# Patient Record
Sex: FEMALE | Race: WHITE | NOT HISPANIC OR LATINO | Employment: PART TIME | URBAN - METROPOLITAN AREA
[De-identification: names, ages, dates, MRNs, and addresses within clinical notes are randomized per-mention and may not be internally consistent; named-entity substitution may affect disease eponyms.]

---

## 2017-11-01 ENCOUNTER — HOSPITAL ENCOUNTER (OUTPATIENT)
Facility: CLINIC | Age: 62
Discharge: HOME | End: 2017-11-01
Attending: COLON & RECTAL SURGERY | Admitting: COLON & RECTAL SURGERY
Payer: COMMERCIAL

## 2017-11-01 VITALS
SYSTOLIC BLOOD PRESSURE: 144 MMHG | OXYGEN SATURATION: 95 % | DIASTOLIC BLOOD PRESSURE: 72 MMHG | BODY MASS INDEX: 29.03 KG/M2 | WEIGHT: 185 LBS | HEIGHT: 67 IN | RESPIRATION RATE: 17 BRPM

## 2017-11-01 LAB — COLONOSCOPY: NORMAL

## 2017-11-01 PROCEDURE — 25000128 H RX IP 250 OP 636: Performed by: COLON & RECTAL SURGERY

## 2017-11-01 PROCEDURE — G0500 MOD SEDAT ENDO SERVICE >5YRS: HCPCS | Performed by: COLON & RECTAL SURGERY

## 2017-11-01 PROCEDURE — 45378 DIAGNOSTIC COLONOSCOPY: CPT | Performed by: COLON & RECTAL SURGERY

## 2017-11-01 PROCEDURE — G0121 COLON CA SCRN NOT HI RSK IND: HCPCS | Performed by: COLON & RECTAL SURGERY

## 2017-11-01 RX ORDER — ONDANSETRON 2 MG/ML
4 INJECTION INTRAMUSCULAR; INTRAVENOUS
Status: DISCONTINUED | OUTPATIENT
Start: 2017-11-01 | End: 2017-11-01 | Stop reason: HOSPADM

## 2017-11-01 RX ORDER — LIDOCAINE 40 MG/G
CREAM TOPICAL
Status: DISCONTINUED | OUTPATIENT
Start: 2017-11-01 | End: 2017-11-01 | Stop reason: HOSPADM

## 2017-11-01 RX ORDER — PROCHLORPERAZINE MALEATE 5 MG
5-10 TABLET ORAL EVERY 6 HOURS PRN
Status: CANCELLED | OUTPATIENT
Start: 2017-11-01

## 2017-11-01 RX ORDER — NALOXONE HYDROCHLORIDE 0.4 MG/ML
.1-.4 INJECTION, SOLUTION INTRAMUSCULAR; INTRAVENOUS; SUBCUTANEOUS
Status: CANCELLED | OUTPATIENT
Start: 2017-11-01 | End: 2017-11-02

## 2017-11-01 RX ORDER — FENTANYL CITRATE 50 UG/ML
INJECTION, SOLUTION INTRAMUSCULAR; INTRAVENOUS PRN
Status: DISCONTINUED | OUTPATIENT
Start: 2017-11-01 | End: 2017-11-01 | Stop reason: HOSPADM

## 2017-11-01 RX ORDER — ONDANSETRON 2 MG/ML
4 INJECTION INTRAMUSCULAR; INTRAVENOUS EVERY 6 HOURS PRN
Status: CANCELLED | OUTPATIENT
Start: 2017-11-01

## 2017-11-01 RX ORDER — FLUMAZENIL 0.1 MG/ML
0.2 INJECTION, SOLUTION INTRAVENOUS
Status: CANCELLED | OUTPATIENT
Start: 2017-11-01 | End: 2017-11-02

## 2017-11-01 RX ORDER — ONDANSETRON 4 MG/1
4 TABLET, ORALLY DISINTEGRATING ORAL EVERY 6 HOURS PRN
Status: CANCELLED | OUTPATIENT
Start: 2017-11-01

## 2017-11-01 NOTE — H&P
Pre-Endoscopy History and Physical   Scarlet Francom MRN# [id number]   YOB: 1955 Age: 62 year old   Date of Procedure: 11/1/2017   Primary care provider: Carloz Sr   Type of Endoscopy: colonoscopy   Reason for Procedure: screening   Type of Anesthesia Anticipated: Moderate Sedation   HPI:   Scarlet is a 62 year old female for screening colonoscopy.  She last had a colonoscopy in 2007 which was normal.  She denies BRBPR, abdominal pain, nausea/vomiting, changes in bowel habits or unintentional weight loss.  She denies a FH of CRC.    Not on File   None      There is no problem list on file for this patient.     Past Medical History:   Diagnosis Date     Arthritis       Past Surgical History:   Procedure Laterality Date     ORTHOPEDIC SURGERY Right 12/2015    Hip athroplasty      Social History   Substance Use Topics     Smoking status: Never Smoker     Smokeless tobacco: Never Used     Alcohol use Yes      Comment: rare      Family History   Problem Relation Age of Onset     Colon Cancer No family hx of       PHYSICAL EXAM:   BP (!) 174/91  Resp 24 There is no height or weight on file to calculate BMI.   RESP: lungs clear to auscultation - no rales, rhonchi or wheezes   CV: regular rates and rhythm   ASA Class 2 - Mild systemic disease    Assessment: 61 y/o woman for screening colonoscopy    Plan: Colonoscopy with moderate sedation.  Risks of the procedure were discussed including, but not limited to, bleeding, perforation and missed lesions.  Patient understands and is willing to proceed.    Haile Whitman MD ....................  11/1/2017   5:06 PM  Colon and Rectal Surgery Staff  [phone]

## 2023-06-12 RX ORDER — ASCORBIC ACID 100 MG
TABLET,CHEWABLE ORAL
Status: ON HOLD | COMMUNITY
End: 2023-08-04

## 2023-06-12 RX ORDER — OMEGA-3 FATTY ACIDS/FISH OIL 300-1000MG
CAPSULE ORAL
Status: ON HOLD | COMMUNITY
End: 2023-08-04

## 2023-06-12 RX ORDER — CALCIUM CARBONATE 500(1250)
1 TABLET ORAL 2 TIMES DAILY WITH MEALS
COMMUNITY
End: 2023-07-31

## 2023-06-12 RX ORDER — MULTIVITAMIN WITH IRON
1 TABLET ORAL DAILY
Status: ON HOLD | COMMUNITY
End: 2023-08-04

## 2023-07-10 NOTE — H&P (VIEW-ONLY)
[facility]      Preoperative Consultation   Scarlet Chapa   : 1955   Gender: female    Date of Encounter: 7/10/2023    Nursing Notes:   Luz Marina Lind  7/10/2023 11:11 AM  Sign at exiting of workspace  Chief Complaint   Patient presents with     Pre-Op Exam     DOS  Left hip replacement       Additional visit information (chief complaint/health maintenance) shared by patient:   Health Maintenance Due   Topic Date Due     Depression screening for age 12+  Never done     HIV for age 15-65  Never done     Hepatitis C screening for age 18-79  Never done     Pap test for age 21-65  Never done     Colonoscopy through age 75  Never done     Zoster (shingles) series for age 50+ (1 of 2) Never done     Mammogram for age 45-75  2016     Tetanus booster  2017     COVID-19 vaccine series (4 - Pfizer series) 2022     DEXA/DXA scan for age 65+  Never done     Medicare Wellness for age 65+  Never done     Pneumococcal series for age 65+ (1 - PCV) Never done       Health maintenance reviewed with patient No    Patient presents for an in-person office visit: alone  Communication Method: Patient is active on [employer] and has been instructed that results/communications will be made via [employer]  If a phone call is needed, the preferred number is: Mobile   Home Phone [phone]   Mobile [phone]     May we leave a detailed message at this number? Yes on home    Scarlet Chapa is a 67 y.o. female (1955) who presents for preop evaluation undergoing replacement for treatment of left hip.    Date of Surgery: 2023  Surgical Specialty: Orthopedic/Spine  Dr. DONALDO Carrero  [facility]Surgical Facility: [facility]  Fax number: [phone]  Surgery type: inpatient  Primary Physician: Clinic, [facility]  Dior Lind CMA............. 7/10/2023 11:10 AM        History of Present Illness     Last visit , new diagnosis htn (/94), started on losartan  50mg  home blood pressure 130s-150s/80-90s  Some cough with the med initially. Sneezing last week, has resolved.     Social  recovery: 1 night stay, to home with help of brother (erika)  smoking: never  etoh: none  substances: none  ACP: Healthcare agent: Tanya Chapa     PMHx/meds  OA left hip  HTN on losartan  HLD  BMI 36  CKDIII  insomnia on magnesium  Supplements/ OTC: fish oil, magnesium, b12, vit D, vit C, tylenol, diclofenac, CoQ10      COVID-19 vaccination status:  primary and original series.   Never had COVID-19.        Review of Systems   A comprehensive review of systems was negative except for items noted in HPI.    Patient Active Problem List   Diagnosis Code     Hyperlipidemia E78.5     HTN (hypertension) I10     Perforated ear drum, left H72.92     Stage 3a chronic kidney disease (HC) N18.31     BMI 38.0-38.9,adult Z68.38     Current Outpatient Medications   Medication Sig     acetaminophen (TYLENOL EXTRA STRGTH) 500 mg tablet Take 500 mg by mouth every 6 hours if needed. Max acetaminophen dose: 4000mg in 24 hrs.     ascorbic acid (VITAMIN C) 1,000 mg tablet Take 1,000 mg by mouth once daily.     Blood Pressure Monitor Kit Frequency of testing: once daily     cholecalciferol (VITAMIN D-3) 2,000 unit capsule Take 2,000 Units by mouth once daily.     cyanocobalamin (VITAMIN B12) 500 mcg tablet Take 500 mcg by mouth once daily.     diclofenac topical (VOLTAREN) 1 % gel Apply 4 g topically to affected area(s) four times daily.     Lactobac. rhamnosus GG-inulin (CULTURELLE PROBIOTICS) 10 billion cell -200 mg chew Take 2 tablets by mouth 2 times daily if needed.     losartan (COZAAR) 50 mg tablet Take 1 Tablet (50 mg) by mouth once daily.     losartan-hydrochlorothiazide, 50-12.5 mg, (HYZAAR) 50-12.5 mg tablet Take 1 Tablet by mouth once daily.     magnesium sulfate 100 mg cap Take 1 capsule by mouth once daily.     omega-3s-dha-epa-fish oil (OMEGA 3) 350-400 mg cap Take 1 capsule by mouth before breakfast.  "    ubidecarenone (coenzyme Q10) 100 mg tab Take by mouth.     No current facility-administered medications for this visit.     Medications have been reviewed by me and are current to the best of my knowledge and ability.     Allergies   Allergen Reactions     Dairy [Lactase] *Unknown     Head congestion blood shot eyes headache tired all the time     Gluten Intolerance-Can't Take     abd bloating, gas     Past Surgical History:   . Laterality Date     RIGHT TOTAL HIP ARTHROPLASTY  12/02/2015     Social History     Tobacco Use     Smoking status: Never     Smokeless tobacco: Never   Vaping Use     Vaping Use: Never used   Substance Use Topics     Alcohol use: Not Currently     Comment: rare holiday     Drug use: No     Family History   Problem Relation Age of Onset     Hypertension Mother      Hypertension Father        PAST DIFFICULTY WITH ANESTHESIA: None     Physical Exam   /80 (Cuff Site: Right Arm, Position: Sitting, Cuff Size: Adult Large)   Pulse 90   Temp 98.5  F (36.9  C) (Oral)   Ht 1.638 m (5' 4.5\")   Wt 103.9 kg (229 lb)   SpO2 98%   BMI 38.70 kg/m   Body mass index is 38.7 kg/m .  General Appearance: Pleasant, alert, appropriate appearance for age. No acute distress  Head Exam: Normal. Normocephalic, atraumatic.  Ear: perforated left tm  OroPharynx Exam: Dental hygiene adequate. Normal buccal mucosa. Normal pharynx.  Neck Exam: Supple, no masses or nodes.  Thyroid Exam: Normal.  Chest/Respiratory Exam: Normal chest wall and respirations. Clear to auscultation.  Cardiovascular Exam: Regular rate and rhythm. S1, S2, no murmur, click, gallop, or rubs.  Skin: no rash or abnormalities  Neurologic Exam: AOx4.  Psychiatric Exam: Normal.         Assessment / Plan   Navya is a 68 yo here today for pre-op for left hip OA, also here for htn follow-up  PMHx/meds  OA left hip  HTN on losartan  HLD  BMI 36  CKDIII  insomnia on magnesium  Supplements/ OTC: fish oil, magnesium, b12, vit D, vit C, tylenol, " diclofenac, CoQ10  The Pre-Op Tool    Recommendations      Intermediate Risk Procedure    Risk of CV Complication (RCRI)  0.5%    Current Cardiac Status  Poor exertional capacity ( < 4 mets )    Cardiac History  No history of coronary artery disease           Labs  HGB within last 30 days  Potassium within last 30 days  Creatinine within last 30 days  EKG  Baseline EKG within the last 12 months  CXR  Not indicated    Stress Testing  Not indicated    * Testing recommendations are intended to assist, but not direct, clinical decisions.           Type & Screen should be obtained by Anesthesia only if the risk of transfusion is > 5% for the procedure     *    Hold Losartan / HCTZ the evening before and/or morning of the procedure.  Hold Diclofenac (Voltaren) for 2 days prior to the procedure.  Take your other medications as usual prior to the procedure  Hold vitamins and/or supplements for 1 week prior to the procedure  Hold fish oil for 2 weeks prior to the procedure  Okay to take Acetaminophen (Tylenol) up until the procedure      Labs: pending  ECG: NSR with possible left atrial enlargement 7/10/2023     ICD-10-CM    1. Preop examination  Z01.818 BASIC METABOLIC PANEL     HEMOGLOBIN     OK READING EKG - NO CHARGE, COMP ONLY     EKG 12 LEAD     OK ECG ROUTINE ECG W/LEAST 12 LDS W/I&R      2. Primary osteoarthritis of left hip  M16.12       3. BMI 38.0-38.9,adult  Z68.38       4. HTN (hypertension)  I10 losartan-hydrochlorothiazide, 50-12.5 mg, (HYZAAR) 50-12.5 mg tablet      5. Perforated ear drum, left  H72.92 AMB CONSULT TO AUDIOLOGY AND ENT      6. Hyperlipidemia, unspecified hyperlipidemia type  E78.5       7. Stage 3a chronic kidney disease (HC)  N18.31         We will have follow-up visit in 2 weeks to control BP further.   Electronically Signed by:   Rajani Bedoya MD                  7/10/2023                      1:06 PM   7/10/2023    Addendum:   hgb: 15.3  K: 4.4  Cr: 1.04, at new baseline    Patient has  visit 7/24/23 to recheck K/Cr and BP.   Rajani Bedoya MD                  7/11/2023                      8:32 AM     Addendum:  BP improved to 140/81, K 4.0 and Cr 0.9  Patient is optimized for surgery  Rajani Bedoya MD                  7/25/2023                      8:42 AM   *Some images could not be shown.

## 2023-07-25 RX ORDER — LOSARTAN POTASSIUM AND HYDROCHLOROTHIAZIDE 12.5; 5 MG/1; MG/1
1 TABLET ORAL DAILY
Status: ON HOLD | COMMUNITY
End: 2023-09-23

## 2023-07-25 NOTE — PROGRESS NOTES
Discharge plan according to [facility]:       06/12/23 0812   Discharge Planning   Patient/Family Anticipates Transition to home   Concerns to be Addressed all concerns addressed in this encounter   Living Arrangements   People in Home other (see comments)  (family)   Type of Residence Private Residence   Is your private residence a single family home or apartment? Single family home   Number of Stairs, Within Home, Primary none   Stair Railings, Within Home, Primary none   Once home, are you able to live on one level? Yes   Which level? Main Level   Bathroom Shower/Tub Tub/Shower unit   Equipment Currently Used at Home none   Support System   Support Systems Family Members  (Brother, Yusuf)   Do you have someone available to stay with you one or two nights once you are home? Yes

## 2023-07-31 RX ORDER — LOSARTAN POTASSIUM AND HYDROCHLOROTHIAZIDE 12.5; 5 MG/1; MG/1
1 TABLET ORAL DAILY
COMMUNITY
Start: 2023-07-10 | End: 2023-07-31

## 2023-07-31 RX ORDER — GLUCOSAMINE HCL 500 MG
100 TABLET ORAL DAILY
COMMUNITY

## 2023-08-02 NOTE — TREATMENT PLAN
Orthopedic Surgery Pre-Op Plan: Scarlet Chapa  pre-op review. This is NOT an H&P   Surgeon: Dr. Carrero    [facility]: [facility]  Name of Surgery: Left Posterior Total Hip Arthroplasty   Date of Surgery: 8/4/23  H&P: Completed on 7/10/23 by Dr. Daly Bedoya at [facility].  History of ASA, NSAIDS, vitamin and/or herbal supplements within 10 days: Yes- Omega 3, Co-Q-10, Diclofenac- patient instructed to hold these supplements and medications for 7 days before surgery.   History of blood thinners: No    Plan:   1) Discharge Plan: Home morning of POD 1 with assist of brother, Yusuf. Please see Discharge Planning section near bottom of this note for further details.     2) Hypertension: recent diagnosis. /97 at visit on 6/14/23 and /80 at preop exam on 7/10/23. Recently started on lisinopril-hydrochlorothiazide and BP improved to 140/81 on recheck 7/24/23. Patient instructed by PCP to hold lisinopril-hydrochlorothiazide on the evening before and day of surgery.     3) Hyperlipidemia: Not on statin.     4) Obesity: BMI 38.7, WT: 229 lbs. I recommend continued efforts at safe weight loss following recovery for surgery.     5) Chronic Kidney Disease: Stage 3a: Stable: Most recent creatinine 0.90, GFR 74, BUN 26 on 7/24/23. I recommend avoiding nephrotoxins like NSAIDS, promoting good post-op hydration and monitoring post-op kidney function closely.      6) Insomnia: on magnesium.    Patient appears medically optimized for upcoming surgery. I would recommend Hospitalist Consult to assist with medical management. Please call me below with any questions on this patient.       Review of Systems Notable for: Hypertension, Hyperlipidemia, Obesity, Chronic Kidney Disease-Stage 3a, Insomnia.     Past Medical History:   Past Medical History:   Diagnosis Date    Arthritis     Chronic kidney disease, stage III (moderate) (H)     Hyperlipidemia     Hypertension     Insomnia     Obese      Past Surgical  History:   Procedure Laterality Date    COLONOSCOPY N/A 11/1/2017    Procedure: COLONOSCOPY;  colonoscopy;  Surgeon: Haile Whitman MD;  Location:  GI    ORTHOPEDIC SURGERY Right 12/2015    Hip athroplasty       Current Medications:  Patient's Medications   New Prescriptions    No medications on file   Previous Medications    ASCORBIC ACID (VITAMIN C) 100 MG CHEW        CHOLECALCIFEROL 50 MCG (2000 UT) CAPS    Take 2,000 Units by mouth daily    COENZYME Q-10 100 MG TABS    Take by mouth.    DICLOFENAC (VOLTAREN) 1 % TOPICAL GEL    Apply topically 4 times daily    LOSARTAN-HYDROCHLOROTHIAZIDE (HYZAAR) 50-12.5 MG TABLET    Take 1 tablet by mouth daily    MAGNESIUM 250 MG TABLET    Take 1 tablet by mouth daily    OMEGA 3 1000 MG CAPS       Modified Medications    No medications on file   Discontinued Medications    CALCIUM CARBONATE (OS-VANGIE) 500 MG TABS    Take 1 tablet by mouth 2 times daily (with meals)    LOSARTAN-HYDROCHLOROTHIAZIDE (HYZAAR) 50-12.5 MG TABLET    Take 1 tablet by mouth daily       ALLERGIES:  Allergies   Allergen Reactions    Gluten Meal Other (See Comments)     abd bloating, gas    Casein Other (See Comments)     Sneezing, watery eyes       Social History  Social History     Tobacco Use    Smoking status: Never    Smokeless tobacco: Never   Substance Use Topics    Alcohol use: Yes     Comment: rare    Drug use: No       Any Abnormal Recent Diagnostics? Yes  Creatinine 0.90, GFR 71, BUN 26 on 7/24/23: shows stable/improved chronic kidney disease.     Discharge Planning:   Discharge plan according to [facility]:      Home morning of POD 1 with assist of Yusuf Zhou     06/12/23 0812   Discharge Planning   Patient/Family Anticipates Transition to home   Concerns to be Addressed all concerns addressed in this encounter   Living Arrangements   People in Home other (see comments)  (family)   Type of Residence Private Residence   Is your private residence a single family home or apartment?  Single family home   Number of Stairs, Within Home, Primary none   Stair Railings, Within Home, Primary none   Once home, are you able to live on one level? Yes   Which level? Main Level   Bathroom Shower/Tub Tub/Shower unit   Equipment Currently Used at Home none   Support System   Support Systems Family Members  (Brother, Yusuf)   Do you have someone available to stay with you one or two nights once you are home? Yes       VANDANA Lee, CNP   Advanced Practice Nurse Navigator- Orthopedics  [facility]   Phone: [phone]

## 2023-08-03 ENCOUNTER — ANESTHESIA EVENT (OUTPATIENT)
Dept: SURGERY | Facility: CLINIC | Age: 68
End: 2023-08-03
Payer: COMMERCIAL

## 2023-08-04 ENCOUNTER — HOSPITAL ENCOUNTER (OUTPATIENT)
Facility: CLINIC | Age: 68
Discharge: HOME | End: 2023-08-05
Attending: ORTHOPAEDIC SURGERY | Admitting: ORTHOPAEDIC SURGERY
Payer: COMMERCIAL

## 2023-08-04 ENCOUNTER — APPOINTMENT (OUTPATIENT)
Dept: PHYSICAL THERAPY | Facility: CLINIC | Age: 68
End: 2023-08-04
Attending: ORTHOPAEDIC SURGERY
Payer: COMMERCIAL

## 2023-08-04 ENCOUNTER — APPOINTMENT (OUTPATIENT)
Dept: RADIOLOGY | Facility: CLINIC | Age: 68
End: 2023-08-04
Attending: ORTHOPAEDIC SURGERY
Payer: COMMERCIAL

## 2023-08-04 ENCOUNTER — ANESTHESIA (OUTPATIENT)
Dept: SURGERY | Facility: CLINIC | Age: 68
End: 2023-08-04
Payer: COMMERCIAL

## 2023-08-04 DIAGNOSIS — Z96.60 STATUS POST JOINT REPLACEMENT: Primary | ICD-10-CM

## 2023-08-04 PROBLEM — Z96.642 S/P TOTAL LEFT HIP ARTHROPLASTY: Status: ACTIVE | Noted: 2023-08-04

## 2023-08-04 PROBLEM — I10 HTN (HYPERTENSION): Chronic | Status: ACTIVE | Noted: 2023-07-10

## 2023-08-04 PROBLEM — Z96.642 S/P TOTAL LEFT HIP ARTHROPLASTY: Chronic | Status: ACTIVE | Noted: 2023-08-04

## 2023-08-04 PROBLEM — E78.5 HYPERLIPIDEMIA: Status: ACTIVE | Noted: 2023-07-10

## 2023-08-04 PROBLEM — E78.5 HYPERLIPIDEMIA: Chronic | Status: ACTIVE | Noted: 2023-07-10

## 2023-08-04 PROBLEM — I10 HTN (HYPERTENSION): Status: ACTIVE | Noted: 2023-07-10

## 2023-08-04 PROBLEM — N18.31 STAGE 3A CHRONIC KIDNEY DISEASE (H): Status: ACTIVE | Noted: 2023-07-10

## 2023-08-04 PROCEDURE — C1776 JOINT DEVICE (IMPLANTABLE): HCPCS | Performed by: ORTHOPAEDIC SURGERY

## 2023-08-04 PROCEDURE — 370N000017 HC ANESTHESIA TECHNICAL FEE, PER MIN: Performed by: ORTHOPAEDIC SURGERY

## 2023-08-04 PROCEDURE — 97161 PT EVAL LOW COMPLEX 20 MIN: CPT | Mod: GP

## 2023-08-04 PROCEDURE — 258N000001 HC RX 258: Performed by: ORTHOPAEDIC SURGERY

## 2023-08-04 PROCEDURE — 250N000013 HC RX MED GY IP 250 OP 250 PS 637: Performed by: PHYSICIAN ASSISTANT

## 2023-08-04 PROCEDURE — 99231 SBSQ HOSP IP/OBS SF/LOW 25: CPT | Performed by: INTERNAL MEDICINE

## 2023-08-04 PROCEDURE — 250N000011 HC RX IP 250 OP 636: Mod: JZ | Performed by: ORTHOPAEDIC SURGERY

## 2023-08-04 PROCEDURE — 250N000011 HC RX IP 250 OP 636: Performed by: ANESTHESIOLOGY

## 2023-08-04 PROCEDURE — 999N000063 XR HIP PORT LEFT 1 VIEW

## 2023-08-04 PROCEDURE — 360N000077 HC SURGERY LEVEL 4, PER MIN: Performed by: ORTHOPAEDIC SURGERY

## 2023-08-04 PROCEDURE — 250N000013 HC RX MED GY IP 250 OP 250 PS 637: Performed by: ORTHOPAEDIC SURGERY

## 2023-08-04 PROCEDURE — 258N000003 HC RX IP 258 OP 636: Performed by: ANESTHESIOLOGY

## 2023-08-04 PROCEDURE — 258N000003 HC RX IP 258 OP 636: Performed by: ORTHOPAEDIC SURGERY

## 2023-08-04 PROCEDURE — 258N000003 HC RX IP 258 OP 636: Performed by: REGISTERED NURSE

## 2023-08-04 PROCEDURE — 272N000001 HC OR GENERAL SUPPLY STERILE: Performed by: ORTHOPAEDIC SURGERY

## 2023-08-04 PROCEDURE — 250N000011 HC RX IP 250 OP 636: Performed by: REGISTERED NURSE

## 2023-08-04 PROCEDURE — 999N000065 XR PELVIS AND HIP PORTABLE LEFT 1 VIEW

## 2023-08-04 PROCEDURE — 97110 THERAPEUTIC EXERCISES: CPT | Mod: GP

## 2023-08-04 PROCEDURE — 710N000010 HC RECOVERY PHASE 1, LEVEL 2, PER MIN: Performed by: ORTHOPAEDIC SURGERY

## 2023-08-04 PROCEDURE — 250N000011 HC RX IP 250 OP 636: Performed by: PHYSICIAN ASSISTANT

## 2023-08-04 PROCEDURE — 250N000009 HC RX 250: Performed by: ORTHOPAEDIC SURGERY

## 2023-08-04 PROCEDURE — 250N000013 HC RX MED GY IP 250 OP 250 PS 637: Performed by: INTERNAL MEDICINE

## 2023-08-04 PROCEDURE — 999N000141 HC STATISTIC PRE-PROCEDURE NURSING ASSESSMENT: Performed by: ORTHOPAEDIC SURGERY

## 2023-08-04 PROCEDURE — 250N000009 HC RX 250: Performed by: REGISTERED NURSE

## 2023-08-04 DEVICE — TRIDENT II TRITANIUM CLUSTER 50D
Type: IMPLANTABLE DEVICE | Site: HIP | Status: FUNCTIONAL
Brand: TRIDENT II

## 2023-08-04 DEVICE — HIP STEM - HIGH OFFSET
Type: IMPLANTABLE DEVICE | Site: HIP | Status: FUNCTIONAL
Brand: INSIGNIA

## 2023-08-04 DEVICE — TRIDENT X3 0 DEGREE POLYETHYLENE INSERT
Type: IMPLANTABLE DEVICE | Site: HIP | Status: FUNCTIONAL
Brand: TRIDENT X3 INSERT

## 2023-08-04 DEVICE — CERAMIC V40 FEMORAL HEAD
Type: IMPLANTABLE DEVICE | Site: HIP | Status: FUNCTIONAL
Brand: BIOLOX

## 2023-08-04 RX ORDER — ASPIRIN 81 MG/1
81 TABLET ORAL 2 TIMES DAILY
Status: DISCONTINUED | OUTPATIENT
Start: 2023-08-04 | End: 2023-08-05 | Stop reason: HOSPADM

## 2023-08-04 RX ORDER — METHOCARBAMOL 500 MG/1
500 TABLET, FILM COATED ORAL EVERY 6 HOURS PRN
Status: DISCONTINUED | OUTPATIENT
Start: 2023-08-04 | End: 2023-08-05 | Stop reason: HOSPADM

## 2023-08-04 RX ORDER — LIDOCAINE HYDROCHLORIDE 10 MG/ML
INJECTION, SOLUTION INFILTRATION; PERINEURAL PRN
Status: DISCONTINUED | OUTPATIENT
Start: 2023-08-04 | End: 2023-08-04

## 2023-08-04 RX ORDER — NALOXONE HYDROCHLORIDE 0.4 MG/ML
0.2 INJECTION, SOLUTION INTRAMUSCULAR; INTRAVENOUS; SUBCUTANEOUS
Status: DISCONTINUED | OUTPATIENT
Start: 2023-08-04 | End: 2023-08-05 | Stop reason: HOSPADM

## 2023-08-04 RX ORDER — PROCHLORPERAZINE MALEATE 5 MG
5 TABLET ORAL EVERY 6 HOURS PRN
Status: DISCONTINUED | OUTPATIENT
Start: 2023-08-04 | End: 2023-08-05 | Stop reason: HOSPADM

## 2023-08-04 RX ORDER — CEFADROXIL 500 MG/1
500 CAPSULE ORAL 2 TIMES DAILY
Qty: 14 CAPSULE | Refills: 0 | Status: ON HOLD | OUTPATIENT
Start: 2023-08-04 | End: 2023-09-23

## 2023-08-04 RX ORDER — HYDROXYZINE HYDROCHLORIDE 10 MG/1
10 TABLET, FILM COATED ORAL EVERY 6 HOURS PRN
Status: DISCONTINUED | OUTPATIENT
Start: 2023-08-04 | End: 2023-08-05 | Stop reason: HOSPADM

## 2023-08-04 RX ORDER — METHOCARBAMOL 500 MG/1
500 TABLET, FILM COATED ORAL EVERY 6 HOURS PRN
Qty: 30 TABLET | Refills: 0 | Status: ON HOLD | OUTPATIENT
Start: 2023-08-04 | End: 2023-09-23

## 2023-08-04 RX ORDER — ONDANSETRON 4 MG/1
4 TABLET, ORALLY DISINTEGRATING ORAL EVERY 30 MIN PRN
Status: CANCELLED | OUTPATIENT
Start: 2023-08-04

## 2023-08-04 RX ORDER — LOSARTAN POTASSIUM AND HYDROCHLOROTHIAZIDE 12.5; 5 MG/1; MG/1
1 TABLET ORAL DAILY
Status: DISCONTINUED | OUTPATIENT
Start: 2023-08-04 | End: 2023-08-05 | Stop reason: HOSPADM

## 2023-08-04 RX ORDER — AMOXICILLIN 250 MG
1 CAPSULE ORAL 2 TIMES DAILY
Status: DISCONTINUED | OUTPATIENT
Start: 2023-08-04 | End: 2023-08-05 | Stop reason: HOSPADM

## 2023-08-04 RX ORDER — HYDROMORPHONE HCL IN WATER/PF 6 MG/30 ML
0.2 PATIENT CONTROLLED ANALGESIA SYRINGE INTRAVENOUS
Status: DISCONTINUED | OUTPATIENT
Start: 2023-08-04 | End: 2023-08-05 | Stop reason: HOSPADM

## 2023-08-04 RX ORDER — MAGNESIUM HYDROXIDE 1200 MG/15ML
LIQUID ORAL PRN
Status: DISCONTINUED | OUTPATIENT
Start: 2023-08-04 | End: 2023-08-04 | Stop reason: HOSPADM

## 2023-08-04 RX ORDER — POLYETHYLENE GLYCOL 3350 17 G/17G
17 POWDER, FOR SOLUTION ORAL DAILY
Status: DISCONTINUED | OUTPATIENT
Start: 2023-08-05 | End: 2023-08-05 | Stop reason: HOSPADM

## 2023-08-04 RX ORDER — CEFAZOLIN SODIUM 2 G/100ML
2 INJECTION, SOLUTION INTRAVENOUS EVERY 8 HOURS
Status: COMPLETED | OUTPATIENT
Start: 2023-08-04 | End: 2023-08-05

## 2023-08-04 RX ORDER — ONDANSETRON 2 MG/ML
4 INJECTION INTRAMUSCULAR; INTRAVENOUS EVERY 30 MIN PRN
Status: CANCELLED | OUTPATIENT
Start: 2023-08-04

## 2023-08-04 RX ORDER — SODIUM CHLORIDE, SODIUM LACTATE, POTASSIUM CHLORIDE, CALCIUM CHLORIDE 600; 310; 30; 20 MG/100ML; MG/100ML; MG/100ML; MG/100ML
INJECTION, SOLUTION INTRAVENOUS CONTINUOUS
Status: CANCELLED | OUTPATIENT
Start: 2023-08-04

## 2023-08-04 RX ORDER — DIAZEPAM 10 MG/2ML
2.5 INJECTION, SOLUTION INTRAMUSCULAR; INTRAVENOUS
Status: CANCELLED | OUTPATIENT
Start: 2023-08-04

## 2023-08-04 RX ORDER — ACETAMINOPHEN 325 MG/1
975 TABLET ORAL ONCE
Status: COMPLETED | OUTPATIENT
Start: 2023-08-04 | End: 2023-08-04

## 2023-08-04 RX ORDER — FENTANYL CITRATE 50 UG/ML
25 INJECTION, SOLUTION INTRAMUSCULAR; INTRAVENOUS EVERY 5 MIN PRN
Status: CANCELLED | OUTPATIENT
Start: 2023-08-04

## 2023-08-04 RX ORDER — GLYCOPYRROLATE 0.2 MG/ML
INJECTION, SOLUTION INTRAMUSCULAR; INTRAVENOUS PRN
Status: DISCONTINUED | OUTPATIENT
Start: 2023-08-04 | End: 2023-08-04

## 2023-08-04 RX ORDER — HYDROMORPHONE HCL IN WATER/PF 6 MG/30 ML
0.4 PATIENT CONTROLLED ANALGESIA SYRINGE INTRAVENOUS
Status: DISCONTINUED | OUTPATIENT
Start: 2023-08-04 | End: 2023-08-05 | Stop reason: HOSPADM

## 2023-08-04 RX ORDER — MAGNESIUM OXIDE 400 MG/1
400 TABLET ORAL DAILY
Status: DISCONTINUED | OUTPATIENT
Start: 2023-08-05 | End: 2023-08-05 | Stop reason: HOSPADM

## 2023-08-04 RX ORDER — UREA 10 %
500 LOTION (ML) TOPICAL DAILY
Status: DISCONTINUED | OUTPATIENT
Start: 2023-08-05 | End: 2023-08-05 | Stop reason: HOSPADM

## 2023-08-04 RX ORDER — SODIUM CHLORIDE, SODIUM LACTATE, POTASSIUM CHLORIDE, CALCIUM CHLORIDE 600; 310; 30; 20 MG/100ML; MG/100ML; MG/100ML; MG/100ML
INJECTION, SOLUTION INTRAVENOUS CONTINUOUS
Status: DISCONTINUED | OUTPATIENT
Start: 2023-08-04 | End: 2023-08-05 | Stop reason: HOSPADM

## 2023-08-04 RX ORDER — AMOXICILLIN 250 MG
1 CAPSULE ORAL 2 TIMES DAILY PRN
Qty: 30 TABLET | Refills: 0 | Status: ON HOLD | OUTPATIENT
Start: 2023-08-04 | End: 2023-09-23

## 2023-08-04 RX ORDER — CHLORAL HYDRATE 500 MG
1 CAPSULE ORAL DAILY
COMMUNITY

## 2023-08-04 RX ORDER — CEFAZOLIN SODIUM/WATER 2 G/20 ML
2 SYRINGE (ML) INTRAVENOUS SEE ADMIN INSTRUCTIONS
Status: DISCONTINUED | OUTPATIENT
Start: 2023-08-04 | End: 2023-08-04 | Stop reason: HOSPADM

## 2023-08-04 RX ORDER — ONDANSETRON 2 MG/ML
4 INJECTION INTRAMUSCULAR; INTRAVENOUS EVERY 6 HOURS PRN
Status: DISCONTINUED | OUTPATIENT
Start: 2023-08-04 | End: 2023-08-05 | Stop reason: HOSPADM

## 2023-08-04 RX ORDER — OXYCODONE HYDROCHLORIDE 5 MG/1
5-10 TABLET ORAL EVERY 4 HOURS PRN
Qty: 26 TABLET | Refills: 0 | Status: ON HOLD | OUTPATIENT
Start: 2023-08-04 | End: 2023-09-23

## 2023-08-04 RX ORDER — DIPHENHYDRAMINE HCL 12.5 MG/5ML
12.5 SOLUTION ORAL EVERY 6 HOURS PRN
Status: CANCELLED | OUTPATIENT
Start: 2023-08-04

## 2023-08-04 RX ORDER — DEXAMETHASONE SODIUM PHOSPHATE 10 MG/ML
INJECTION, SOLUTION INTRAMUSCULAR; INTRAVENOUS PRN
Status: DISCONTINUED | OUTPATIENT
Start: 2023-08-04 | End: 2023-08-04

## 2023-08-04 RX ORDER — LIDOCAINE 40 MG/G
CREAM TOPICAL
Status: DISCONTINUED | OUTPATIENT
Start: 2023-08-04 | End: 2023-08-05 | Stop reason: HOSPADM

## 2023-08-04 RX ORDER — ACETAMINOPHEN 500 MG
1000 TABLET ORAL EVERY 6 HOURS PRN
COMMUNITY

## 2023-08-04 RX ORDER — ACETAMINOPHEN 325 MG/1
975 TABLET ORAL EVERY 8 HOURS
Status: DISCONTINUED | OUTPATIENT
Start: 2023-08-04 | End: 2023-08-05 | Stop reason: HOSPADM

## 2023-08-04 RX ORDER — OXYCODONE HYDROCHLORIDE 5 MG/1
5 TABLET ORAL EVERY 4 HOURS PRN
Status: DISCONTINUED | OUTPATIENT
Start: 2023-08-04 | End: 2023-08-05 | Stop reason: HOSPADM

## 2023-08-04 RX ORDER — ONDANSETRON 4 MG/1
4 TABLET, ORALLY DISINTEGRATING ORAL EVERY 6 HOURS PRN
Status: DISCONTINUED | OUTPATIENT
Start: 2023-08-04 | End: 2023-08-05 | Stop reason: HOSPADM

## 2023-08-04 RX ORDER — HYDROXYZINE HYDROCHLORIDE 10 MG/1
10 TABLET, FILM COATED ORAL EVERY 6 HOURS PRN
Qty: 30 TABLET | Refills: 0 | Status: ON HOLD | OUTPATIENT
Start: 2023-08-04 | End: 2023-09-23

## 2023-08-04 RX ORDER — HYDROMORPHONE HCL IN WATER/PF 6 MG/30 ML
0.2 PATIENT CONTROLLED ANALGESIA SYRINGE INTRAVENOUS EVERY 5 MIN PRN
Status: CANCELLED | OUTPATIENT
Start: 2023-08-04

## 2023-08-04 RX ORDER — CEFAZOLIN SODIUM/WATER 2 G/20 ML
2 SYRINGE (ML) INTRAVENOUS
Status: COMPLETED | OUTPATIENT
Start: 2023-08-04 | End: 2023-08-04

## 2023-08-04 RX ORDER — FENTANYL CITRATE 50 UG/ML
50 INJECTION, SOLUTION INTRAMUSCULAR; INTRAVENOUS EVERY 5 MIN PRN
Status: CANCELLED | OUTPATIENT
Start: 2023-08-04

## 2023-08-04 RX ORDER — DIPHENHYDRAMINE HYDROCHLORIDE 50 MG/ML
12.5 INJECTION INTRAMUSCULAR; INTRAVENOUS EVERY 6 HOURS PRN
Status: CANCELLED | OUTPATIENT
Start: 2023-08-04

## 2023-08-04 RX ORDER — HYDROMORPHONE HCL IN WATER/PF 6 MG/30 ML
0.4 PATIENT CONTROLLED ANALGESIA SYRINGE INTRAVENOUS EVERY 5 MIN PRN
Status: CANCELLED | OUTPATIENT
Start: 2023-08-04

## 2023-08-04 RX ORDER — LIDOCAINE 40 MG/G
CREAM TOPICAL
Status: DISCONTINUED | OUTPATIENT
Start: 2023-08-04 | End: 2023-08-04 | Stop reason: HOSPADM

## 2023-08-04 RX ORDER — TRANEXAMIC ACID 650 MG/1
1950 TABLET ORAL ONCE
Status: COMPLETED | OUTPATIENT
Start: 2023-08-04 | End: 2023-08-04

## 2023-08-04 RX ORDER — NALOXONE HYDROCHLORIDE 0.4 MG/ML
0.4 INJECTION, SOLUTION INTRAMUSCULAR; INTRAVENOUS; SUBCUTANEOUS
Status: DISCONTINUED | OUTPATIENT
Start: 2023-08-04 | End: 2023-08-05 | Stop reason: HOSPADM

## 2023-08-04 RX ORDER — ONDANSETRON 2 MG/ML
INJECTION INTRAMUSCULAR; INTRAVENOUS PRN
Status: DISCONTINUED | OUTPATIENT
Start: 2023-08-04 | End: 2023-08-04

## 2023-08-04 RX ORDER — OXYCODONE HYDROCHLORIDE 5 MG/1
10 TABLET ORAL EVERY 4 HOURS PRN
Status: DISCONTINUED | OUTPATIENT
Start: 2023-08-04 | End: 2023-08-05 | Stop reason: HOSPADM

## 2023-08-04 RX ORDER — PROPOFOL 10 MG/ML
INJECTION, EMULSION INTRAVENOUS CONTINUOUS PRN
Status: DISCONTINUED | OUTPATIENT
Start: 2023-08-04 | End: 2023-08-04

## 2023-08-04 RX ORDER — HALOPERIDOL 5 MG/ML
1 INJECTION INTRAMUSCULAR
Status: CANCELLED | OUTPATIENT
Start: 2023-08-04

## 2023-08-04 RX ORDER — ACETAMINOPHEN 325 MG/1
650 TABLET ORAL EVERY 4 HOURS PRN
Status: DISCONTINUED | OUTPATIENT
Start: 2023-08-07 | End: 2023-08-05 | Stop reason: HOSPADM

## 2023-08-04 RX ORDER — SODIUM CHLORIDE, SODIUM LACTATE, POTASSIUM CHLORIDE, CALCIUM CHLORIDE 600; 310; 30; 20 MG/100ML; MG/100ML; MG/100ML; MG/100ML
INJECTION, SOLUTION INTRAVENOUS CONTINUOUS
Status: DISCONTINUED | OUTPATIENT
Start: 2023-08-04 | End: 2023-08-04 | Stop reason: HOSPADM

## 2023-08-04 RX ORDER — BUPIVACAINE HYDROCHLORIDE 5 MG/ML
INJECTION, SOLUTION EPIDURAL; INTRACAUDAL
Status: COMPLETED | OUTPATIENT
Start: 2023-08-04 | End: 2023-08-04

## 2023-08-04 RX ORDER — ASPIRIN 81 MG/1
81 TABLET ORAL 2 TIMES DAILY
Qty: 60 TABLET | Refills: 0 | Status: ON HOLD | OUTPATIENT
Start: 2023-08-04 | End: 2023-09-23

## 2023-08-04 RX ORDER — LACTOBACILLUS RHAMNOSUS GG 10B CELL
2 CAPSULE ORAL 2 TIMES DAILY PRN
Status: ON HOLD | COMMUNITY
End: 2023-09-23

## 2023-08-04 RX ORDER — VITAMIN B COMPLEX
2000 TABLET ORAL DAILY
Status: DISCONTINUED | OUTPATIENT
Start: 2023-08-05 | End: 2023-08-05 | Stop reason: HOSPADM

## 2023-08-04 RX ORDER — BISACODYL 10 MG
10 SUPPOSITORY, RECTAL RECTAL DAILY PRN
Status: DISCONTINUED | OUTPATIENT
Start: 2023-08-04 | End: 2023-08-05 | Stop reason: HOSPADM

## 2023-08-04 RX ORDER — UREA 10 %
500 LOTION (ML) TOPICAL DAILY
COMMUNITY

## 2023-08-04 RX ORDER — PROPOFOL 10 MG/ML
INJECTION, EMULSION INTRAVENOUS PRN
Status: DISCONTINUED | OUTPATIENT
Start: 2023-08-04 | End: 2023-08-04

## 2023-08-04 RX ADMIN — OXYCODONE HYDROCHLORIDE 10 MG: 5 TABLET ORAL at 15:12

## 2023-08-04 RX ADMIN — PHENYLEPHRINE HYDROCHLORIDE 0.3 MCG/KG/MIN: 10 INJECTION INTRAVENOUS at 08:39

## 2023-08-04 RX ADMIN — ACETAMINOPHEN 975 MG: 325 TABLET ORAL at 12:42

## 2023-08-04 RX ADMIN — SODIUM CHLORIDE, POTASSIUM CHLORIDE, SODIUM LACTATE AND CALCIUM CHLORIDE: 600; 310; 30; 20 INJECTION, SOLUTION INTRAVENOUS at 10:17

## 2023-08-04 RX ADMIN — METHOCARBAMOL TABLETS 500 MG: 500 TABLET, COATED ORAL at 12:42

## 2023-08-04 RX ADMIN — TRANEXAMIC ACID 1950 MG: 650 TABLET ORAL at 07:57

## 2023-08-04 RX ADMIN — MIDAZOLAM 2 MG: 1 INJECTION INTRAMUSCULAR; INTRAVENOUS at 08:29

## 2023-08-04 RX ADMIN — ONDANSETRON 4 MG: 2 INJECTION INTRAMUSCULAR; INTRAVENOUS at 08:29

## 2023-08-04 RX ADMIN — PROPOFOL 200 MCG/KG/MIN: 10 INJECTION, EMULSION INTRAVENOUS at 08:40

## 2023-08-04 RX ADMIN — BUPIVACAINE HYDROCHLORIDE 2.8 ML: 5 INJECTION, SOLUTION EPIDURAL; INTRACAUDAL; PERINEURAL at 08:39

## 2023-08-04 RX ADMIN — LIDOCAINE HYDROCHLORIDE 30 MG: 10 INJECTION, SOLUTION INFILTRATION; PERINEURAL at 08:29

## 2023-08-04 RX ADMIN — SODIUM CHLORIDE, POTASSIUM CHLORIDE, SODIUM LACTATE AND CALCIUM CHLORIDE: 600; 310; 30; 20 INJECTION, SOLUTION INTRAVENOUS at 23:42

## 2023-08-04 RX ADMIN — Medication 2 G: at 08:38

## 2023-08-04 RX ADMIN — DEXAMETHASONE SODIUM PHOSPHATE 4 MG: 10 INJECTION, SOLUTION INTRAMUSCULAR; INTRAVENOUS at 08:29

## 2023-08-04 RX ADMIN — SODIUM CHLORIDE, POTASSIUM CHLORIDE, SODIUM LACTATE AND CALCIUM CHLORIDE: 600; 310; 30; 20 INJECTION, SOLUTION INTRAVENOUS at 07:43

## 2023-08-04 RX ADMIN — GLYCOPYRROLATE 0.1 MG: 0.2 INJECTION INTRAMUSCULAR; INTRAVENOUS at 08:29

## 2023-08-04 RX ADMIN — OXYCODONE HYDROCHLORIDE 10 MG: 5 TABLET ORAL at 20:02

## 2023-08-04 RX ADMIN — CEFAZOLIN SODIUM 2 G: 2 INJECTION, SOLUTION INTRAVENOUS at 16:40

## 2023-08-04 RX ADMIN — PROPOFOL 20 MG: 10 INJECTION, EMULSION INTRAVENOUS at 08:40

## 2023-08-04 RX ADMIN — LOSARTAN POTASSIUM AND HYDROCHLOROTHIAZIDE 1 TABLET: 50; 12.5 TABLET, FILM COATED ORAL at 15:13

## 2023-08-04 RX ADMIN — SENNOSIDES AND DOCUSATE SODIUM 1 TABLET: 50; 8.6 TABLET ORAL at 20:06

## 2023-08-04 RX ADMIN — ASPIRIN 81 MG: 81 TABLET, COATED ORAL at 20:06

## 2023-08-04 RX ADMIN — ACETAMINOPHEN 975 MG: 325 TABLET ORAL at 20:03

## 2023-08-04 ASSESSMENT — ACTIVITIES OF DAILY LIVING (ADL)
ADLS_ACUITY_SCORE: 33
ADLS_ACUITY_SCORE: 27

## 2023-08-04 NOTE — PHARMACY-ADMISSION MEDICATION HISTORY
Pharmacist RAYSHAWN Medication History    Admission medication history is complete. The information provided in this note is only as accurate as the sources available at the time of the update.    Medication reconciliation/reorder completed by provider prior to medication history? No    Information Source(s): Patient and Clinic records and Care Everywhere via in-person    Pertinent Information: N/A    Medication Affordability:  Not including over the counter (OTC) medications, was there a time in the past 3 months when you did not take your medications as prescribed because of cost?: No    Allergies reviewed with patient and updates made in EHR: yes    Medications available for use during hospital stay: None.      Medication History Completed By: Ashok Waldron [facility] 8/4/2023 7:34 AM    PTA Med List   Medication Sig Last Dose    acetaminophen (TYLENOL) 500 MG tablet Take 1,000 mg by mouth every 6 hours as needed for mild pain 8/4/2023 at 0430    ascorbic acid 1000 MG TABS tablet Take 1,000 mg by mouth daily 8/3/2023 at AM    cholecalciferol 50 MCG (2000 UT) CAPS Take 2,000 Units by mouth daily 8/3/2023 at AM    coenzyme Q-10 100 MG TABS Take 100 mg by mouth daily 7/28/2023    cyanocobalamin (VITAMIN B-12) 500 MCG tablet Take 500 mcg by mouth daily 8/3/2023 at AM    diclofenac (VOLTAREN) 1 % topical gel Apply 4 g topically 4 times daily as needed for moderate pain 8/2/2023    fish oil-omega-3 fatty acids 1000 MG capsule Take 1 g by mouth daily 7/28/2023    lactobacillus rhamnosus, GG, (CULTURELL) capsule Take 2 capsules by mouth 2 times daily as needed Unknown    losartan-hydrochlorothiazide (HYZAAR) 50-12.5 MG tablet Take 1 tablet by mouth daily 8/3/2023 at AM    magnesium 100 MG TABS Take 1 tablet by mouth daily 8/3/2023 at AM

## 2023-08-04 NOTE — PROGRESS NOTES
"[facility]    Medicine Progress Note - Hospitalist Service    Date of Admission:  8/4/2023  67-year-old status post left total hip replacement earlier today she had a uncomplicated right total hip in 2015 she is generally healthy hypertension hyperlipidemia see problem list  Outpatient medications will be continued to probably go home tomorrow if everything goes well        Assessment & Plan   Active Problems:    S/P total left hip arthroplasty (8/4/2023)    Hyperlipidemia (7/10/2023)    HTN (hypertension) (7/10/2023)    Stage 3a chronic kidney disease (H) (7/10/2023)           Diet: Advance Diet as Tolerated: Regular Diet Adult  Discharge Instruction - Regular Diet Adult    DVT Prophylaxis: Aspirin  Vargas Catheter: Not present  Lines: None     Cardiac Monitoring: None  Code Status: Full Code      Clinically Significant Risk Factors Present on Admission                  # Hypertension: Noted on problem list      # Obesity: Estimated body mass index is 37.59 kg/m  as calculated from the following:    Height as of this encounter: 1.626 m (5' 4\").    Weight as of this encounter: 99.3 kg (219 lb).            Disposition Plan      Expected Discharge Date: 08/05/2023                  Keanu Gaona MD  Hospitalist Service  [facility]  Securely message with Access Information Management (more info)  Text page via [employer] Paging/Directory   ______________________________________________________________________    Interval History       Physical Exam   Vital Signs: Temp: 97.1  F (36.2  C) Temp src: Oral BP: (!) 167/76 Pulse: 76   Resp: 17 SpO2: 96 % O2 Device: None (Room air) Oxygen Delivery: 2 LPM  Weight: 219 lbs 0 oz    She is comfortable postop alert oriented speech fluent she is eating some lunch sausage and egg  No complaints    Medical Decision Making         Time 25 minutes    Data     "

## 2023-08-04 NOTE — ANESTHESIA CARE TRANSFER NOTE
Patient: Scarlet [facility]    Procedure: Procedure(s):  LEFT POSTERIOR TOTAL HIP ARTHROPLASTY       Diagnosis: Primary osteoarthritis of left hip [M16.12]  Diagnosis Additional Information: No value filed.    Anesthesia Type:   Spinal     Note:    Oropharynx: oropharynx clear of all foreign objects  Level of Consciousness: awake  Oxygen Supplementation: face mask  Level of Supplemental Oxygen (L/min / FiO2): 8  Independent Airway: airway patency satisfactory and stable  Dentition: dentition unchanged  Vital Signs Stable: post-procedure vital signs reviewed and stable  Report to RN Given: handoff report given  Patient transferred to: PACU    Handoff Report: Identifed the Patient, Identified the Reponsible Provider, Reviewed the pertinent medical history, Discussed the surgical course, Reviewed Intra-OP anesthesia mangement and issues during anesthesia, Set expectations for post-procedure period and Allowed opportunity for questions and acknowledgement of understanding      Vitals:  Vitals Value Taken Time   /70    Temp 37C    Pulse 88 08/04/23 1056   Resp 21 08/04/23 1056   SpO2 99 % 08/04/23 1056   Vitals shown include unvalidated device data.    Electronically Signed By: VANDANA Gupta CRNA  August 4, 2023  10:58 AM

## 2023-08-04 NOTE — INTERVAL H&P NOTE
"I have reviewed the surgical (or preoperative) H&P that is linked to this encounter, and examined the patient. There are no significant changes    Clinical Conditions Present on Arrival:  Clinically Significant Risk Factors Present on Admission                  # Obesity: Estimated body mass index is 38.7 kg/m  as calculated from the following:    Height as of this encounter: 1.638 m (5' 4.5\").    Weight as of this encounter: 103.9 kg (229 lb).       "

## 2023-08-04 NOTE — PROGRESS NOTES
[facility]  OUTPATIENT PHYSICAL THERAPY EVALUATION  PLAN OF TREATMENT FOR OUTPATIENT REHABILITATION  (COMPLETE FOR INITIAL CLAIMS ONLY)  Patient's Last Name, First Name, M.I.  YOB: 1955  Scarlet Chapa                        Provider's Name  [facility] Medical Record No.  [id number]                             Onset Date:  08/04/23   Start of Care Date:  08/04/23   Type:     _X_PT   ___OT   ___SLP Medical Diagnosis:  s/p L JAMARCUS              PT Diagnosis:  Impaired functional mobility Visits from SOC:  1     See note for plan of treatment, functional goals and certification details    I CERTIFY THE NEED FOR THESE SERVICES FURNISHED UNDER        THIS PLAN OF TREATMENT AND WHILE UNDER MY CARE     (Physician co-signature of this document indicates review and certification of the therapy plan).                08/04/23 1500   Appointment Info   Signing Clinician's Name / Credentials (PT) Darlin Laguerre, PT, DPT   Quick Adds   Quick Adds Certification   Living Environment   People in Home alone   Current Living Arrangements house   Home Accessibility stairs to enter home   Number of Stairs, Main Entrance 1  (Threshold only)   Stair Railings, Main Entrance none   Transportation Anticipated family or friend will provide   Living Environment Comments All needs met on one level.   Self-Care   Usual Activity Tolerance good   Current Activity Tolerance moderate   Equipment Currently Used at Home walker, standard   Fall history within last six months no   Activity/Exercise/Self-Care Comment Patient has a standard walker to use at home.   General Information   Onset of Illness/Injury or Date of Surgery 08/04/23   Referring Physician Nitin Carrero MD   Patient/Family Therapy Goals Statement (PT) Return home   Pertinent History of Current Problem (include personal factors and/or comorbidities that impact the POC) s/p L JAMARCUS   Existing  Precautions/Restrictions (S)  no hip IR;no hip ADD past midline;90 degree hip flexion   Weight-Bearing Status - LLE weight-bearing as tolerated   Weight-Bearing Status - RLE full weight-bearing   Range of Motion (ROM)   Range of Motion ROM deficits secondary to surgical procedure   Strength (Manual Muscle Testing)   Strength (Manual Muscle Testing) Able to perform R SLR;Able to perform L SLR   Bed Mobility   Bed Mobility supine-sit   Supine-Sit [place] (Bed Mobility) unable to assess   Comment, (Bed Mobility) Patient declined OOB mobility as she was just up to the bathroom with nursing staff.   Transfers   Transfers sit-stand transfer   Sit-Stand Transfer   Sit-Stand [place] (Transfers) unable to assess   Comment, (Sit-Stand Transfer) Patient declined OOB mobility as she was just up to the bathroom with nursing staff.   Gait/Stairs (Locomotion)   [place] Level (Gait) unable to assess   Clinical Impression   Criteria for Skilled Therapeutic Intervention Yes, treatment indicated   PT Diagnosis (PT) Impaired functional mobility   Influenced by the following impairments Pain, decreased ROM, decreased strength, posterior hip precautions   Functional limitations due to impairments Bed mobility, transfers, gait, stairs   Clinical Presentation (PT Evaluation Complexity) Stable/Uncomplicated   Clinical Presentation Rationale Patient presents as medically diagnosed.   Clinical Decision Making (Complexity) low complexity   Planned Therapy Interventions (PT) bed mobility training;gait training;home exercise program;patient/family education;ROM (range of motion);stair training;strengthening;transfer training;home program guidelines   Anticipated Equipment Needs at Discharge (PT) walker, standard   Risk & Benefits of therapy have been explained evaluation/treatment results reviewed;care plan/treatment goals reviewed;patient   PT Total Evaluation Time   PT Eval, Low Complexity Minutes (90261) 5   Plan of Care Review    Plan of Care Reviewed With patient   Therapy Certification   Start of care date 08/04/23   Certification date from 08/04/23   Certification date to 08/11/23   Medical Diagnosis s/p L JAMARCUS   Physical Therapy Goals   PT Frequency Daily   PT Predicted Duration/Target Date for Goal Attainment 08/11/23   PT Goals Bed Mobility;Transfers;Gait;Stairs   PT: Bed Mobility Supervision/stand-by assist;Supine to/from sit;Within precautions   PT: Transfers Supervision/stand-by assist;Sit to/from stand;Assistive device;Within precautions   PT: Gait Supervision/stand-by assist;Assistive device;150 feet   PT: Stairs Supervision/stand-by assist;Assistive device;1 stair   Interventions   Interventions Quick Adds Therapeutic Procedure   Therapeutic Procedure/Exercise   Ther. Procedure: strength, endurance, ROM, flexibillity Minutes (01355) 15   Symptoms Noted During/After Treatment increased pain   Treatment Detail/Skilled Intervention Patient completed JAMARCUS HEP x 5 reps each LLE. Supervision and verbal cues for exercise technique. Discussed posterior hip precautions with exercises and functional mobility. Patient appeared anxious about being able to maintain posterior hip precautions with functional mobility.   PT Discharge Planning   PT Plan Posterior hip precautions: Bed mobility, transfers, gait, 1 ABHIJIT   PT Discharge Recommendation (DC Rec)   (Defer to ortho)   PT Rationale for DC Rec Patient was up to the bathroom with assist of 1 per nursing. Patient completed JAMARCUS HEP with supervision. Patient will have assist from her brother at home.   PT Brief overview of current status Assist of 1 with standard walker.   Total Session Time   Timed Code Treatment Minutes 15   Total Session Time (sum of timed and untimed services) 20

## 2023-08-04 NOTE — PLAN OF CARE
"Patient vital signs are at baseline: Yes  Patient able to ambulate as they were prior to admission or with assist devices provided by therapies during their stay:  No,  Reason:  pt does not bear weight on surgical extremity. Pt states that her left knee \"feels like a sandbag is on it\" pt states that has been happening before surgery. Pt utilizes the walker and the gait belt.   Patient MUST void prior to discharge:  Yes, pt has ambulated to the restroom.   Patient able to tolerate oral intake:  Yes  Pain has adequate pain control using Oral analgesics:  Yes, pain has been controled with PRN and Scheduled pain medications   Does patient have an identified :  Yes  Has goal D/C date and time been discussed with patient:  Yes, pt plans to discharge tomorrow, family to transport.                          "

## 2023-08-04 NOTE — BRIEF OP NOTE
[facility]    Brief Operative Note    Pre-operative diagnosis: Primary osteoarthritis of left hip [M16.12]  Post-operative diagnosis Same as pre-operative diagnosis    Procedure: Procedure(s):  LEFT POSTERIOR TOTAL HIP ARTHROPLASTY  Surgeon: Surgeon(s) and Role:     * Nitin Carrero MD - Primary     * Belkis Talbot PA-C - Assisting  Anesthesia: Choice   Estimated Blood Loss: 200 ml    Drains: None  Specimens: * No specimens in log *  Findings:   None.  Complications: None.  Implants:   Implant Name Type Inv. Item Serial No.  Lot No. LRB No. Used Action   TRIDENT II TRITANIUM CLUSTERHOLE 50D - [id number] Total Joint Component/Insert TRIDENT II TRITANIUM CLUSTERHOLE 50D  [employer] [id number] Left 1 Implanted   INSERT ACE 36MM 0DEG X3 723-00-36D - [id number] Total Joint Component/Insert INSERT ACE 36MM 0DEG X3 723-00-36D  [employer] [id number] Left 1 Implanted   IMPLANT HIP 41MM NK INSG 7 HPSTM HI OFST 109MM 7620-2422 - [id number] Total Joint Component/Insert IMPLANT HIP 41MM NK INSG 7 HPSTM HI OFST 109MM 6598-2366  [employer] [id number] Left 1 Implanted   IMP HEAD FEMORAL STRK BIOLOX DELTA CERAMIC 36MM -5MM - [id number] Total Joint Component/Insert IMP HEAD FEMORAL STRK BIOLOX DELTA CERAMIC 36MM -5MM  [employer] [id number] Left 1 Wasted   IMP HEAD FEMORAL STRK BIOLOX DELTA CERAMIC 36MM +0MM - [id number] Total Joint Component/Insert IMP HEAD FEMORAL STRK BIOLOX DELTA CERAMIC 36MM +0MM  [employer] [id number] Left 1 Implanted     Plan:   -Pain control  -Routine 24 hours IV antibiotics, discharge on 2 weeks PO cefadroxil  -ASA for DVT ppx, mechanical  -WBAT LLE, posterior hip precautions  -Xrs in PACU

## 2023-08-04 NOTE — ANESTHESIA POSTPROCEDURE EVALUATION
Patient: Scarlet GODOY [facility]    Procedure: Procedure(s):  LEFT POSTERIOR TOTAL HIP ARTHROPLASTY       Anesthesia Type:  Spinal    Note:  Disposition: Inpatient   Postop Pain Control: Uneventful            Sign Out: Well controlled pain   PONV: No   Neuro/Psych: Uneventful            Sign Out: Acceptable/Baseline neuro status   Airway/Respiratory: Uneventful            Sign Out: Acceptable/Baseline resp. status   CV/Hemodynamics: Uneventful            Sign Out: Acceptable CV status; No obvious hypovolemia; No obvious fluid overload   Other NRE: NONE   DID A NON-ROUTINE EVENT OCCUR? No    Event details/Postop Comments:  SAB resolving. No current complaints.         Last vitals:  Vitals Value Taken Time   /67 08/04/23 1200   Temp     Pulse 76 08/04/23 1207   Resp 16 08/04/23 1142   SpO2 99 % 08/04/23 1208   Vitals shown include unvalidated device data.    Electronically Signed By: Jun Lopez MD  August 4, 2023  12:17 PM

## 2023-08-04 NOTE — ANESTHESIA PROCEDURE NOTES
"Intrathecal injection Procedure Note    Pre-Procedure   Staff -        Anesthesiologist:  Jun Lopez MD       Performed By: anesthesiologist       Location: OR       Procedure Start/Stop Times: 8/4/2023 8:35 AM and 8/4/2023 8:39 AM       Pre-Anesthestic Checklist: patient identified, IV checked, risks and benefits discussed, informed consent, monitors and equipment checked and pre-op evaluation  Timeout:       Correct Patient: Yes        Correct Procedure: Yes        Correct Site: Yes        Correct Position: Yes   Procedure Documentation  Procedure: intrathecal injection       Patient Position: sitting       Patient Prep/Sterile Barriers: sterile gloves, mask, patient draped       Skin prep: Chloraprep       Insertion Site: L3-4. (midline approach).       Needle Gauge: 24.        Needle Length (Inches): 4        Spinal Needle Type: Pencan       Introducer used       Introducer: 20 G       # of attempts: 1 and  # of redirects:  1  Medication(s) Administered   0.5% Bupivacaine PF (Intrathecal) - Intrathecal   2.8 mL - 8/4/2023 8:39:00 AM  Medication Administration Time: 8/4/2023 8:35 AM      FOR [facility] ([facility] ONLY:   Pain Team Contact information: please page the Pain Team Via [employer]. Search \"Pain\". During daytime hours, please page the attending first. At night please page the resident first.      "

## 2023-08-04 NOTE — OP NOTE
Operative Report    PATIENT Scarlet Chapa   DATE OF SURGERY:  8/4/2023    PREOPERATIVE DIAGNOSIS   Primary osteoarthritis of left hip [M16.12].    POSTOPERATIVE DIAGNOSIS   Primary osteoarthritis of left hip [M16.12].    PROCEDURE PERFORMED   Left posterior approach total hip arthroplasty    IMPLANTS  Implant Name Type Inv. Item Serial No.  Lot No. LRB No. Used Action   TRIDENT II TRITANIUM CLUSTERHOLE 50D - [id number] Total Joint Component/Insert TRIDENT II TRITANIUM CLUSTERHOLE 50D  [employer] [id number] Left 1 Implanted   INSERT ACE 36MM 0DEG X3 723-00-36D - [id number] Total Joint Component/Insert INSERT ACE 36MM 0DEG X3 723-00-36D  [employer] [id number] Left 1 Implanted   IMPLANT HIP 41MM NK INSG 7 HPSTM HI OFST 109MM 3707-2869 - [id number] Total Joint Component/Insert IMPLANT HIP 41MM NK INSG 7 HPSTM HI OFST 109MM [id number]  [employer] [id number] Left 1 Implanted   IMP HEAD FEMORAL STRK BIOLOX DELTA CERAMIC 36MM -5MM - [id number] Total Joint Component/Insert IMP HEAD FEMORAL STRK BIOLOX DELTA CERAMIC 36MM -5MM  [employer] [id number] Left 1 Wasted   IMP HEAD FEMORAL STRK BIOLOX DELTA CERAMIC 36MM +0MM - [id number] Total Joint Component/Insert IMP HEAD FEMORAL STRK BIOLOX DELTA CERAMIC 36MM +0MM  [employer] [id number] Left 1 Implanted       SURGEON  Nitin Carrero MD     ASSISTANT   Belkis Madden PA-C; assistant was required for patient positioning, surgical assistance, wound closure and monitoring patient's safety throughout the case.    ANESTHESIA  Choice      FINDINGS:  Full-thickness cartilage loss.  Significant shortening through the hip.    SPECIMENS:  none    ESTIMATED BLOOD LOSS:  200 cc    COMPLICATIONS   None.      INDICATION FOR PROCEDURE  Scarlet Chapa is a 67 year old female with a history of ongoing increasing left hip and groin pain.  X-rays have shown end-stage osteoarthritis of the that hip.  The patient has tried and failed all conservative  measures.  The pain in the hip is severe to the point where it's dramatically affecting their quality of life and their ability to exercise and even perform some simple activities of daily living.  Consequently, after discussion regarding the risks and benefits as well as the pre, post, and perioperative course associated with hip replacement they elected to proceed..    PREOPERATIVE EXAMINATION:   Skin overlying left hip is intact.  Really well    INFORMED CONSENT  Scarlet Chapa was identified in the preoperative holding area and was identified using medical record number, name, and date of birth, all of which were confirmed. The operative extremity was marked using an indelible marker. Once again, all risks and benefits as well as alternatives to surgical intervention were discussed with the patient in detail and all their questions were answered. Risks discussed included but were not limited to: instability, leg length discrepancy, infection, wound healing issues, persistent pain, bleeding, scarring, stiffness, thromboembolic events, fracture, malalignment/malrotation, implant complications, severe limb dysfunction, loss of limb, and loss of life. The patient signed informed consent and wished to proceed with surgery as scheduled.     DESCRIPTION OF PROCEDURE   Scarlet Chapa was brought back to the operating room.  Spinal anesthesia was achieved without difficulty.   she was then transferred to the standard operating table and placed in lateral cubitus position, left side up, axillary roll placed, Tavarez positioner, all bony prominences well-padded.    A timeout was performed prior to the procedure.  Three separate staff members confirmed the patient's name, correct site and side of surgery and procedure being performed.  Antibiotics and TXA were confirmed to be given prior to incision.     Standard posterior approach was utilized to the hip.  This was centered over the greater trochanter.  Identified the  IT band.  This was divided.  Charnley retractor was inserted.  Posterior capsular structures taken down 1 continuous sleeve and tagged for later repair.  Hip was then dislocated.  Neck cut made about 15 mm above the lesser trochanter.  Attention was then turned to the acetabulum.    The acetabulum was cleared of soft tissue circumferentially and we had good exposure.  Then began sequentially reaming up to a 50 mm reamer with good bleeding bone circumferentially.  A 50 mm cluster hole acetabular component was then impacted in the correct amount of inclination and anteversion.  Trial liner placed.  Attention tendon turned back to the femur.    Proximal femur was exposed.  Began sequentially broaching.  I broached up to a size 7 Insignia.  Placed a high offset trial femoral neck and a -5 mm ball.  The hip was easily reduced indicating that we were a bit short.  Stability exam was only to about 45 degrees or so.  X-rays confirmed the position of the components and slight shortening of the hip.    Hip was dislocated.  Broach was removed.  Trial liner was removed from the acetabulum.  Final 36 mm inner diameter liner was snapped into place.  Final 7 insignia high offset stem was then impacted.  No fractures noted.  A final 0 mm neck length 36 mm ceramic ball was then placed under clean and dry trunnion.  The hip was reduced.  With these components down in place, had internal rotation stability to 70 degrees or so performed intermittent dislocation.  Hip felt appropriate in length.    Hip was thoroughly irrigated.  Posterior capsular structures were repaired with a #5 Ethibond.  IT band was repaired with a #5 Ethibond, followed by running #1 strata fix.  Multiple fat layers were closed.  Subcutaneous tissues were closed with 2-0 Vicryl followed by running 3-0 Monocryl for the skin, followed by a Mepilex dressing.    She was then rolled to the supine position, taken to the recovery in stable condition.  There were no  complications.  She tolerated procedure well.    POSTOPERATIVE PLAN:  -Pain control  -Routine 24 hours IV antibiotics, discharge on 2 weeks PO cefadroxil  -ASA for DVT ppx, mechanical  -WBAT LLE, posterior hip precautions  -Xrs in PACU    Nitin Carrero MD   [facility]

## 2023-08-04 NOTE — ANESTHESIA PREPROCEDURE EVALUATION
Anesthesia Pre-Procedure Evaluation    Patient: Scarlet Chapa   MRN: [id number] : 1955        Procedure : Procedure(s):  LEFT POSTERIOR TOTAL HIP ARTHROPLASTY          Past Medical History:   Diagnosis Date     Arthritis      Chronic kidney disease, stage III (moderate) (H)      Hyperlipidemia      Hypertension      Insomnia      Obese       Past Surgical History:   Procedure Laterality Date     COLONOSCOPY N/A 2017    Procedure: COLONOSCOPY;  colonoscopy;  Surgeon: Haile Whitman MD;  Location: [facility]     ORTHOPEDIC SURGERY Right 2015    Hip athroplasty      Allergies   Allergen Reactions     Gluten Meal Other (See Comments)     abd bloating, gas     Casein Other (See Comments)     Sneezing, watery eyes      Social History     Tobacco Use     Smoking status: Never     Smokeless tobacco: Never   Substance Use Topics     Alcohol use: Yes     Comment: rare      Wt Readings from Last 1 Encounters:   23 99.3 kg (219 lb)        Anesthesia Evaluation   Pt has had prior anesthetic.         ROS/MED HX  ENT/Pulmonary:  - neg pulmonary ROS     Neurologic:  - neg neurologic ROS     Cardiovascular:     (+)  hypertension- -   -  - -                                      METS/Exercise Tolerance:     Hematologic:  - neg hematologic  ROS     Musculoskeletal:  - neg musculoskeletal ROS     GI/Hepatic:  - neg GI/hepatic ROS     Renal/Genitourinary:     (+) renal disease, type: CRI,            Endo:     (+)               Obesity (BMI 38),       Psychiatric/Substance Use:  - neg psychiatric ROS     Infectious Disease:  - neg infectious disease ROS     Malignancy:  - neg malignancy ROS     Other:  - neg other ROS          Physical Exam    Airway  airway exam normal      Mallampati: II   TM distance: > 3 FB   Neck ROM: full   Mouth opening: > 3 cm    Respiratory Devices and Support         Dental  no notable dental history     (+) Modest Abnormalities - crowns, retainers, 1 or 2 missing teeth      Cardiovascular    cardiovascular exam normal       Rhythm and rate: regular and normal     Pulmonary   pulmonary exam normal        breath sounds clear to auscultation         OUTSIDE LABS:  CBC: No results found for: WBC, HGB, HCT, PLT  BMP: No results found for: NA, POTASSIUM, CHLORIDE, CO2, BUN, CR, GLC  COAGS: No results found for: PTT, INR, FIBR  POC: No results found for: BGM, HCG, HCGS  HEPATIC: No results found for: ALBUMIN, PROTTOTAL, ALT, AST, GGT, ALKPHOS, BILITOTAL, BILIDIRECT, BRIANNA  OTHER: No results found for: PH, LACT, A1C, VANGIE, PHOS, MAG, LIPASE, AMYLASE, TSH, T4, T3, CRP, SED    Anesthesia Plan    ASA Status:  3    NPO Status:  NPO Appropriate    Anesthesia Type: Spinal.   Induction: N/a.           Consents    Anesthesia Plan(s) and associated risks, benefits, and realistic alternatives discussed. Questions answered and patient/representative(s) expressed understanding.     - Discussed:     - Discussed with:  Patient      - Extended Intubation/Ventilatory Support Discussed: No.      - Patient is DNR/DNI Status: No     Use of blood products discussed: No .     Postoperative Care    Pain management: Multi-modal analgesia.   PONV prophylaxis: Ondansetron (or other 5HT-3), Dexamethasone or Solumedrol     Comments:                Jun Lopez MD

## 2023-08-04 NOTE — OR NURSING
Arthroplasty bone and tissue disposed of in yellow hazard bag at end of procedure per hospital policy. Surgeon stated bone and tissue did not need to be sent to pathology.

## 2023-08-05 ENCOUNTER — APPOINTMENT (OUTPATIENT)
Dept: OCCUPATIONAL THERAPY | Facility: CLINIC | Age: 68
End: 2023-08-05
Attending: ORTHOPAEDIC SURGERY
Payer: COMMERCIAL

## 2023-08-05 ENCOUNTER — APPOINTMENT (OUTPATIENT)
Dept: PHYSICAL THERAPY | Facility: CLINIC | Age: 68
End: 2023-08-05
Attending: ORTHOPAEDIC SURGERY
Payer: COMMERCIAL

## 2023-08-05 VITALS
BODY MASS INDEX: 37.39 KG/M2 | WEIGHT: 219 LBS | RESPIRATION RATE: 18 BRPM | TEMPERATURE: 98.5 F | DIASTOLIC BLOOD PRESSURE: 70 MMHG | HEART RATE: 97 BPM | OXYGEN SATURATION: 93 % | HEIGHT: 64 IN | SYSTOLIC BLOOD PRESSURE: 140 MMHG

## 2023-08-05 LAB — HGB BLD-MCNC: 12 G/DL (ref 11.7–15.7)

## 2023-08-05 PROCEDURE — 97166 OT EVAL MOD COMPLEX 45 MIN: CPT | Mod: GO

## 2023-08-05 PROCEDURE — 97110 THERAPEUTIC EXERCISES: CPT | Mod: GP | Performed by: PHYSICAL THERAPIST

## 2023-08-05 PROCEDURE — 250N000013 HC RX MED GY IP 250 OP 250 PS 637: Performed by: INTERNAL MEDICINE

## 2023-08-05 PROCEDURE — 250N000013 HC RX MED GY IP 250 OP 250 PS 637: Performed by: ORTHOPAEDIC SURGERY

## 2023-08-05 PROCEDURE — 250N000011 HC RX IP 250 OP 636: Mod: JZ | Performed by: ORTHOPAEDIC SURGERY

## 2023-08-05 PROCEDURE — 85018 HEMOGLOBIN: CPT | Performed by: ORTHOPAEDIC SURGERY

## 2023-08-05 PROCEDURE — 36415 COLL VENOUS BLD VENIPUNCTURE: CPT | Performed by: ORTHOPAEDIC SURGERY

## 2023-08-05 PROCEDURE — 97116 GAIT TRAINING THERAPY: CPT | Mod: GP | Performed by: PHYSICAL THERAPIST

## 2023-08-05 PROCEDURE — 97535 SELF CARE MNGMENT TRAINING: CPT | Mod: GO

## 2023-08-05 PROCEDURE — 97530 THERAPEUTIC ACTIVITIES: CPT | Mod: GP | Performed by: PHYSICAL THERAPIST

## 2023-08-05 RX ADMIN — ACETAMINOPHEN 975 MG: 325 TABLET ORAL at 05:08

## 2023-08-05 RX ADMIN — CYANOCOBALAMIN TAB 500 MCG 500 MCG: 500 TAB at 09:27

## 2023-08-05 RX ADMIN — SENNOSIDES AND DOCUSATE SODIUM 1 TABLET: 50; 8.6 TABLET ORAL at 09:23

## 2023-08-05 RX ADMIN — METHOCARBAMOL TABLETS 500 MG: 500 TABLET, COATED ORAL at 09:23

## 2023-08-05 RX ADMIN — Medication 2000 UNITS: at 09:23

## 2023-08-05 RX ADMIN — OXYCODONE HYDROCHLORIDE 5 MG: 5 TABLET ORAL at 01:03

## 2023-08-05 RX ADMIN — METHOCARBAMOL TABLETS 500 MG: 500 TABLET, COATED ORAL at 01:03

## 2023-08-05 RX ADMIN — ASPIRIN 81 MG: 81 TABLET, COATED ORAL at 09:23

## 2023-08-05 RX ADMIN — CEFAZOLIN SODIUM 2 G: 2 INJECTION, SOLUTION INTRAVENOUS at 00:55

## 2023-08-05 RX ADMIN — LOSARTAN POTASSIUM AND HYDROCHLOROTHIAZIDE 1 TABLET: 50; 12.5 TABLET, FILM COATED ORAL at 09:27

## 2023-08-05 ASSESSMENT — ACTIVITIES OF DAILY LIVING (ADL)
ADLS_ACUITY_SCORE: 37
ADLS_ACUITY_SCORE: 39
ADLS_ACUITY_SCORE: 37
ADLS_ACUITY_SCORE: 33
ADLS_ACUITY_SCORE: 33

## 2023-08-05 NOTE — PROGRESS NOTES
Care Management Discharge Note    Discharge Date: 08/05/2023       Discharge Disposition: Home    Discharge Services: None    Discharge DME: None    Discharge Transportation: family or friend will provide    Private pay costs discussed: Not applicable    Education Provided on the Discharge Plan: Yes    Persons Notified of Discharge Plans:  patient    Patient/Family in Agreement with the Plan:  Yes    Additional Information:    Pt discharging to home via family transport; outpatient follow-up.  No CM needs identified.    Kanu Rick RN

## 2023-08-05 NOTE — PLAN OF CARE
Patient vital signs are at baseline: No,  Reason:  variable BP  Patient able to ambulate as they were prior to admission or with assist devices provided by therapies during their stay:  No,  Reason:  Patient had dizzy spell while ambulating back to bed from bathroom supported by staff. Continues to complaining of feeling weak.  Patient MUST void prior to discharge:  Yes  Patient able to tolerate oral intake:  Yes  Pain has adequate pain control using Oral analgesics:  Yes  Does patient have an identified :  Yes  Has goal D/C date and time been discussed with patient:  Yes, patient plans to discharge but may require more time to recover.

## 2023-08-05 NOTE — PROGRESS NOTES
[facility]      OUTPATIENT OCCUPATIONAL THERAPY  EVALUATION  PLAN OF TREATMENT FOR OUTPATIENT REHABILITATION  (COMPLETE FOR INITIAL CLAIMS ONLY)  Patient's Last Name, First Name, M.I.  YOB: 1955  Scarlet Chapa                          Provider's Name  [facility] Medical Record No.  [id number]                               Onset Date:  08/04/23   Start of Care Date:        Type:     ___PT   _X_OT   ___SLP Medical Diagnosis:                           OT Diagnosis:      Visits from SOC:  1   _________________________________________________________________________________  Plan of Treatment/Functional Goals    Planned Interventions:     Goals: See Occupational Therapy Goals on Care Plan in [employer] electronic health record.    Therapy Frequency:    Predicted Duration of Therapy Intervention:    _________________________________________________________________________________    I CERTIFY THE NEED FOR THESE SERVICES FURNISHED UNDER        THIS PLAN OF TREATMENT AND WHILE UNDER MY CARE .             Physician Signature               Date    X_____________________________________________________                   , Certification date to: (P) 08/26/23    Referring Physician: Jon Carrero MD            Initial Assessment        See Occupational Therapy evaluation dated   in Epic electronic health record.

## 2023-08-05 NOTE — PLAN OF CARE
Patient vital signs are at baseline: BP elevated at times  Patient able to ambulate as they were prior to admission or with assist devices provided by therapies during their stay:  No,  Reason:  pt continues to not bear weight. Uses walker and gait belt  Patient MUST void prior to discharge:  Yes. Patient voiding in BR  Patient able to tolerate oral intake:  Yes  Pain has adequate pain control using Oral analgesics:  Yes,  pain controlled with Tylenol, Oxycodone and methocarbamol.   Does patient have an identified :  Yes  Has goal D/C date and time been discussed with patient:  Yes, plans to discharge tomorrow.

## 2023-08-05 NOTE — PROGRESS NOTES
"Orthopedic Surgery Progress Note    Subjective: Reports feeling lightheaded when up with nursing last evening, no falls, improved since then. Pain well controlled. Tolerating PO trials. Voiding spontaneously. Antegrade bowel function. Denies cp, sob, calf pain, fevers, chills, sweats. Denies new onset numbness/tingling in operative extremity.    Exam:  BP (!) 140/70 (BP Location: Right arm)   Pulse 97   Temp 98.5  F (36.9  C) (Oral)   Resp 18   Ht 1.626 m (5' 4\")   Wt 99.3 kg (219 lb)   SpO2 93%   BMI 37.59 kg/m    Gen: Awake, alert, NAD  Resp: breathing equal and non-labored  Extremities:    LLE: Surgical incision clean, dry, intact.  5/5 EHL/FHL/TA/Gsc.  [place] in s/s/dp/sp/t distributions.  2+ DP and PT pulses. Toes WWP, adequate cap refill.    Labs:    Recent Labs   Lab 08/05/23  0600   HGB 12.0     No lab results found in last 7 days.  No lab results found in last 7 days.      Assessment:   67 year old female who is POD 1 status post left posterior total arthroplasty for arthritis with Dr. Nitin Carrero    Routine postoperative course    Plan:  Activity: Up with assist.  Weight bearing status: wBAT  Antibiotics: IV 24 hours, postop PO  Diet: Begin with clear fluids and progress diet as tolerated.  DVT prophylaxis: ASA 81mg BID and mechanical   Pain management: transition from IV to orals as tolerated.   Physical Therapy: Mobilization, gait training, ROM, ADL's.  Occupational Therapy: ADL's.  Follow-up: Clinic with Dr. Carrero in 2 weeks    Disposition: Pending progress with therapies, pain control on orals, and medical stability, anticipate discharge to home on POD #1-2.    Future Appointments   Date Time Provider Department Center   8/5/2023  9:15 AM Deny [facility]ues [facility]        Saroj Asencio MD  Orthopedic Surgery  [facility]    "

## 2023-08-05 NOTE — PROGRESS NOTES
EFRAIN met with Pt as she had questions regarding [facility] insurance paying for up to 28 meals a day after discharge.  TAYLOR called [facility] and is closed.  EFRAIN asked Pt to call [facility] on Monday with details as have not heard of this benefit.     CORNELL Atkinson

## 2023-08-05 NOTE — PLAN OF CARE
Patient vital signs are at baseline: No,  Reason:  BP variable, see flowsheet  Patient able to ambulate as they were prior to admission or with assist devices provided by therapies during their stay:  No,  Reason:  patient utilizing walker to bathroom  Patient MUST void prior to discharge:  Yes  Patient able to tolerate oral intake:  Yes  Pain has adequate pain control using Oral analgesics:  Yes  Does patient have an identified :  Yes  Has goal D/C date and time been discussed with patient:  Yes

## 2023-08-05 NOTE — PROGRESS NOTES
Occupational Therapy Discharge Summary    Reason for therapy discharge:    Discharged to home.    Progress towards therapy goal(s). See goals on Care Plan in [facility] electronic health record for goal details.  Goals met    Therapy recommendation(s):    No further therapy is recommended.

## 2023-08-05 NOTE — PROGRESS NOTES
08/05/23 0733   Appointment Info   Signing Clinician's Name / Credentials (OT) Irma Orellana OTR/L   Quick Adds   Quick Adds Certification   Living Environment   People in Home alone  (brother staying with her)   Current Living Arrangements house   Self-Care   Usual Activity Tolerance good   Current Activity Tolerance moderate   Equipment Currently Used at Home walker, standard;raised toilet seat  (tub shower with seat,)   Fall history within last six months no   Instrumental Activities of Daily Living (IADL)   Previous Responsibilities meal prep;housekeeping;shopping;laundry;driving   General Information   Onset of Illness/Injury or Date of Surgery 08/04/23   Referring Physician Jon Carrero MD   Patient/Family Therapy Goal Statement (OT) return home   Existing Precautions/Restrictions no hip IR;no hip ADD past midline;90 degree hip flexion   Left Lower Extremity (Weight-bearing Status) weight-bearing as tolerated (WBAT)   Cognitive Status Examination   Orientation Status orientation to person, place and time   Visual Perception   Visual Impairment/Limitations WFL   Sensory   Sensory Quick Adds sensation intact   Range of Motion Comprehensive   General Range of Motion no range of motion deficits identified   Strength Comprehensive (MMT)   General Manual Muscle Testing (MMT) Assessment no strength deficits identified   Coordination   Upper Extremity Coordination No deficits were identified   Activities of Daily Living   BADL Assessment/Intervention toileting   Toileting   Assistive Devices (Toileting) raised toilet seat   Comment, (Toileting) moves slowly very cautious.   [place] Level (Toileting) minimum assist (75% patient effort)   Clinical Impression   Criteria for Skilled Therapeutic Interventions Met (OT) Yes, treatment indicated   OT Diagnosis decreased ADLs   OT Problem List-Impairments impacting ADL activity tolerance impaired;pain   Assessment of Occupational Performance 1-3 Performance Deficits    Planned Therapy Interventions (OT) ADL retraining;transfer training   Clinical Decision Making Complexity (OT) moderate complexity   Anticipated Equipment Needs Upon Discharge (OT) raised toilet seat  (may need new RTS with arms as one at home is tght fitting)   Risk & Benefits of therapy have been explained patient   Clinical Impression Comments pt is slow moving, very cautious   OT Total Evaluation Time   OT Eval, Moderate Complexity Minutes (46047) 15   Therapy Certification   Medical Diagnosis JAMARCUS   Start of Care Date 08/05/23   Certification date from 08/05/23   Certification date to 08/26/23   OT Goals   Therapy Frequency (OT) One time eval and treatment   OT Predicted Duration/Target Date for Goal Attainment 08/05/23   OT Goals Lower Body Dressing;Toilet Transfer/Toileting   OT: Lower Body Dressing Modified independent;using adaptive equipment   OT: Toilet Transfer/Toileting Modified independent   Interventions   Interventions Quick Adds Self-Care/Home Management   Self-Care/Home Management   Self-Care/Home Mgmt/ADL, Compensatory, Meal Prep Minutes (65514) 40   Symptoms Noted During/After Treatment (Meal Preparation/Planning Training) none   Treatment Detail/Skilled Intervention Nrsg reports became dizzy last HS but ok to treat now. Pt is mod ind with bed mob in/out. Mod ind with bed toilet and chair transfers after to cues for safety and hand placement. mod ind with func mob using FWW in room and bed room after cues for walker safety and hand placement.  Pt is mod ind with donning pants with AE (has Equip at home) Pt instructed in car and tub transfers. Pt provided with JAMARCUS handout. Pt a bit heisitant to move or attempt activities. but appeared mor confident by end of tx.   OT Discharge Planning   OT Plan d/c ot   OT Discharge Recommendation (DC Rec) (S)  home with assist   OT Rationale for DC Rec goals met   OT Brief overview of current status mod in with adls and mobility   Total Session Time   Timed  Code Treatment Minutes 40   Total Session Time (sum of timed and untimed services) 55

## 2023-08-05 NOTE — DISCHARGE SUMMARY
ORTHOPEDIC DISCHARGE SUMMARY       Scarlet Chapa,  1955, MRN [id number]    Admission Date: 2023  Admission Diagnoses: Primary osteoarthritis of left hip [M16.12]  Status post joint replacement [Z96.60]     Discharge Date:  23    Post-operative Day:  1 Day Post-Op    Reason for Admission: The patient was admitted for the following:  Procedure(s):  LEFT POSTERIOR TOTAL HIP ARTHROPLASTY      BRIEF HOSPITAL COURSE   This 67 year old female underwent the aforementioned procedure with Dr. Carrero on 23. There were no intraoperative complications and the patient was transferred to the recovery room and later the orthopedic unit in stable condition. Once the patient reached the orthopedic floor our orthopedic pain protocol was implemented along with the following:    Anticoagulation Medications: ASA  Therapy: PT and OT  Activity: WBAT  Bracing: None    Consultations during Admission: Hospitalist service for medical management     COMPLICATIONS/SIGNIFICANT FINDINGS   None    DISCHARGE INFORMATION   Condition at discharge: Good  Discharge destination: Home  Patient was seen by myself on the date of discharge.    FOLLOW UP CARE   Follow up with orthopedics in 2 weeks or sooner should the need arise. Ortho will continue to manage pain control, post op anticoagulation and incision care.     Follow up with your PCP for management of chronic medical problems and to evaluate for post op medical complications including constipation, nausea/vomiting, DVT/PE, anemia, changes in blood pressure, fevers/chills, urinary retention and atelectasis/pneumonia.     Subjective   67-year-old status post left posterior total hip arthroplasty    Physical Exam   Gen: Awake, alert, NAD  Resp: breathing equal and non-labored  Extremities:     LLE: Surgical incision clean, dry, intact.  5/5 EHL/FHL/TA/Gsc.  [place] in s/s/dp/sp/t distributions.  2+ DP and PT pulses. Toes WWP, adequate cap refill.    BP (!) 140/70 (BP Location: Right  "arm)   Pulse 97   Temp 98.5  F (36.9  C) (Oral)   Resp 18   Ht 1.626 m (5' 4\")   Wt 99.3 kg (219 lb)   SpO2 93%   BMI 37.59 kg/m      Pertinent Results at Discharge     Hemoglobin   Date/Time Value Ref Range Status   08/05/2023 06:00 AM 12.0 11.7 - 15.7 g/dL Final       Problem List   Active Problems:    S/P total left hip arthroplasty    Hyperlipidemia    HTN (hypertension)    Stage 3a chronic kidney disease (H)        Saroj Asencio MD  Date: 8/5/2023  Time: 9:09 AM    "

## 2023-08-06 NOTE — PLAN OF CARE
Physical Therapy Discharge Summary    Reason for therapy discharge:    Discharged to home.    Progress towards therapy goal(s). See goals on Care Plan in [facility] electronic health record for goal details.  Goals met    Therapy recommendation(s):    Continued therapy is recommended.  Rationale/Recommendations:   .OP PT in 6-8 weeks if not happy with gait.

## 2023-09-02 ENCOUNTER — HEALTH MAINTENANCE LETTER (OUTPATIENT)
Age: 68
End: 2023-09-02

## 2023-09-22 ENCOUNTER — APPOINTMENT (OUTPATIENT)
Dept: CT IMAGING | Facility: CLINIC | Age: 68
End: 2023-09-22
Attending: EMERGENCY MEDICINE
Payer: COMMERCIAL

## 2023-09-22 ENCOUNTER — HOSPITAL ENCOUNTER (OUTPATIENT)
Facility: CLINIC | Age: 68
Setting detail: OBSERVATION
Discharge: HOME | End: 2023-09-23
Attending: EMERGENCY MEDICINE | Admitting: INTERNAL MEDICINE
Payer: COMMERCIAL

## 2023-09-22 DIAGNOSIS — N13.2 HYDRONEPHROSIS WITH URINARY OBSTRUCTION DUE TO URETERAL CALCULUS: ICD-10-CM

## 2023-09-22 DIAGNOSIS — N20.0 NEPHROLITHIASIS: Primary | ICD-10-CM

## 2023-09-22 DIAGNOSIS — N20.0 KIDNEY STONE: ICD-10-CM

## 2023-09-22 LAB
ALBUMIN UR-MCNC: NEGATIVE MG/DL
APPEARANCE UR: CLEAR
BILIRUB UR QL STRIP: NEGATIVE
COLOR UR AUTO: ABNORMAL
GLUCOSE UR STRIP-MCNC: NEGATIVE MG/DL
HGB UR QL STRIP: NEGATIVE
KETONES UR STRIP-MCNC: NEGATIVE MG/DL
LEUKOCYTE ESTERASE UR QL STRIP: ABNORMAL
NITRATE UR QL: NEGATIVE
PH UR STRIP: 5 [PH] (ref 5–7)
RBC URINE: 1 /HPF
SP GR UR STRIP: 1.01 (ref 1–1.03)
SQUAMOUS EPITHELIAL: <1 /HPF
UROBILINOGEN UR STRIP-MCNC: NORMAL MG/DL
WBC URINE: 13 /HPF

## 2023-09-22 PROCEDURE — 81001 URINALYSIS AUTO W/SCOPE: CPT | Performed by: EMERGENCY MEDICINE

## 2023-09-22 PROCEDURE — 74176 CT ABD & PELVIS W/O CONTRAST: CPT

## 2023-09-22 PROCEDURE — 36415 COLL VENOUS BLD VENIPUNCTURE: CPT | Performed by: EMERGENCY MEDICINE

## 2023-09-22 PROCEDURE — 99285 EMERGENCY DEPT VISIT HI MDM: CPT | Mod: 25

## 2023-09-22 PROCEDURE — 87086 URINE CULTURE/COLONY COUNT: CPT | Performed by: EMERGENCY MEDICINE

## 2023-09-23 ENCOUNTER — ANESTHESIA (OUTPATIENT)
Dept: SURGERY | Facility: CLINIC | Age: 68
End: 2023-09-23
Payer: COMMERCIAL

## 2023-09-23 ENCOUNTER — APPOINTMENT (OUTPATIENT)
Dept: GENERAL RADIOLOGY | Facility: CLINIC | Age: 68
End: 2023-09-23
Attending: EMERGENCY MEDICINE
Payer: COMMERCIAL

## 2023-09-23 ENCOUNTER — ANESTHESIA EVENT (OUTPATIENT)
Dept: SURGERY | Facility: CLINIC | Age: 68
End: 2023-09-23
Payer: COMMERCIAL

## 2023-09-23 VITALS
HEIGHT: 65 IN | BODY MASS INDEX: 36.07 KG/M2 | OXYGEN SATURATION: 95 % | TEMPERATURE: 98 F | RESPIRATION RATE: 13 BRPM | DIASTOLIC BLOOD PRESSURE: 81 MMHG | WEIGHT: 216.5 LBS | HEART RATE: 76 BPM | SYSTOLIC BLOOD PRESSURE: 165 MMHG

## 2023-09-23 PROBLEM — N13.2 HYDRONEPHROSIS WITH URINARY OBSTRUCTION DUE TO URETERAL CALCULUS: Status: ACTIVE | Noted: 2023-09-23

## 2023-09-23 PROBLEM — N20.0 NEPHROLITHIASIS: Status: ACTIVE | Noted: 2023-09-23

## 2023-09-23 PROBLEM — N20.0 KIDNEY STONE: Status: ACTIVE | Noted: 2023-09-23

## 2023-09-23 LAB
ANION GAP SERPL CALCULATED.3IONS-SCNC: 10 MMOL/L (ref 7–15)
ANION GAP SERPL CALCULATED.3IONS-SCNC: 15 MMOL/L (ref 7–15)
BASOPHILS # BLD AUTO: 0 10E3/UL (ref 0–0.2)
BASOPHILS NFR BLD AUTO: 1 %
BUN SERPL-MCNC: 26.8 MG/DL (ref 8–23)
BUN SERPL-MCNC: 30.7 MG/DL (ref 8–23)
CALCIUM SERPL-MCNC: 9.3 MG/DL (ref 8.8–10.2)
CALCIUM SERPL-MCNC: 9.7 MG/DL (ref 8.8–10.2)
CHLORIDE SERPL-SCNC: 100 MMOL/L (ref 98–107)
CHLORIDE SERPL-SCNC: 104 MMOL/L (ref 98–107)
CREAT SERPL-MCNC: 1.09 MG/DL (ref 0.51–0.95)
CREAT SERPL-MCNC: 1.16 MG/DL (ref 0.51–0.95)
DEPRECATED HCO3 PLAS-SCNC: 19 MMOL/L (ref 22–29)
DEPRECATED HCO3 PLAS-SCNC: 23 MMOL/L (ref 22–29)
EGFRCR SERPLBLD CKD-EPI 2021: 52 ML/MIN/1.73M2
EGFRCR SERPLBLD CKD-EPI 2021: 56 ML/MIN/1.73M2
EOSINOPHIL # BLD AUTO: 0.2 10E3/UL (ref 0–0.7)
EOSINOPHIL NFR BLD AUTO: 4 %
ERYTHROCYTE [DISTWIDTH] IN BLOOD BY AUTOMATED COUNT: 13.2 % (ref 10–15)
GLUCOSE SERPL-MCNC: 91 MG/DL (ref 70–99)
GLUCOSE SERPL-MCNC: 92 MG/DL (ref 70–99)
HCT VFR BLD AUTO: 36.9 % (ref 35–47)
HGB BLD-MCNC: 12.9 G/DL (ref 11.7–15.7)
IMM GRANULOCYTES # BLD: 0 10E3/UL
IMM GRANULOCYTES NFR BLD: 0 %
LYMPHOCYTES # BLD AUTO: 1.4 10E3/UL (ref 0.8–5.3)
LYMPHOCYTES NFR BLD AUTO: 24 %
MCH RBC QN AUTO: 30.2 PG (ref 26.5–33)
MCHC RBC AUTO-ENTMCNC: 35 G/DL (ref 31.5–36.5)
MCV RBC AUTO: 86 FL (ref 78–100)
MONOCYTES # BLD AUTO: 0.6 10E3/UL (ref 0–1.3)
MONOCYTES NFR BLD AUTO: 11 %
NEUTROPHILS # BLD AUTO: 3.6 10E3/UL (ref 1.6–8.3)
NEUTROPHILS NFR BLD AUTO: 60 %
NRBC # BLD AUTO: 0 10E3/UL
NRBC BLD AUTO-RTO: 0 /100
PLATELET # BLD AUTO: 208 10E3/UL (ref 150–450)
POTASSIUM SERPL-SCNC: 3.4 MMOL/L (ref 3.4–5.3)
POTASSIUM SERPL-SCNC: 4 MMOL/L (ref 3.4–5.3)
RBC # BLD AUTO: 4.27 10E6/UL (ref 3.8–5.2)
SODIUM SERPL-SCNC: 134 MMOL/L (ref 136–145)
SODIUM SERPL-SCNC: 137 MMOL/L (ref 136–145)
WBC # BLD AUTO: 5.9 10E3/UL (ref 4–11)

## 2023-09-23 PROCEDURE — 52332 CYSTOSCOPY AND TREATMENT: CPT | Mod: LT | Performed by: UROLOGY

## 2023-09-23 PROCEDURE — C1769 GUIDE WIRE: HCPCS | Performed by: UROLOGY

## 2023-09-23 PROCEDURE — 258N000003 HC RX IP 258 OP 636: Performed by: NURSE ANESTHETIST, CERTIFIED REGISTERED

## 2023-09-23 PROCEDURE — 250N000009 HC RX 250: Performed by: UROLOGY

## 2023-09-23 PROCEDURE — C2617 STENT, NON-COR, TEM W/O DEL: HCPCS | Performed by: UROLOGY

## 2023-09-23 PROCEDURE — 99221 1ST HOSP IP/OBS SF/LOW 40: CPT | Mod: 25 | Performed by: UROLOGY

## 2023-09-23 PROCEDURE — 87086 URINE CULTURE/COLONY COUNT: CPT | Performed by: UROLOGY

## 2023-09-23 PROCEDURE — 36415 COLL VENOUS BLD VENIPUNCTURE: CPT | Performed by: INTERNAL MEDICINE

## 2023-09-23 PROCEDURE — 80048 BASIC METABOLIC PNL TOTAL CA: CPT | Performed by: EMERGENCY MEDICINE

## 2023-09-23 PROCEDURE — 250N000025 HC SEVOFLURANE, PER MIN: Performed by: UROLOGY

## 2023-09-23 PROCEDURE — 250N000011 HC RX IP 250 OP 636: Performed by: NURSE ANESTHETIST, CERTIFIED REGISTERED

## 2023-09-23 PROCEDURE — 710N000009 HC RECOVERY PHASE 1, LEVEL 1, PER MIN: Performed by: UROLOGY

## 2023-09-23 PROCEDURE — 250N000011 HC RX IP 250 OP 636: Mod: JZ | Performed by: NURSE ANESTHETIST, CERTIFIED REGISTERED

## 2023-09-23 PROCEDURE — 85025 COMPLETE CBC W/AUTO DIFF WBC: CPT | Performed by: EMERGENCY MEDICINE

## 2023-09-23 PROCEDURE — 999N000141 HC STATISTIC PRE-PROCEDURE NURSING ASSESSMENT: Performed by: UROLOGY

## 2023-09-23 PROCEDURE — 36415 COLL VENOUS BLD VENIPUNCTURE: CPT | Performed by: EMERGENCY MEDICINE

## 2023-09-23 PROCEDURE — 370N000017 HC ANESTHESIA TECHNICAL FEE, PER MIN: Performed by: UROLOGY

## 2023-09-23 PROCEDURE — 250N000011 HC RX IP 250 OP 636: Performed by: UROLOGY

## 2023-09-23 PROCEDURE — 999N000179 XR SURGERY CARM FLUORO LESS THAN 5 MIN W STILLS: Mod: TC

## 2023-09-23 PROCEDURE — 258N000003 HC RX IP 258 OP 636: Performed by: ANESTHESIOLOGY

## 2023-09-23 PROCEDURE — 74420 UROGRAPHY RTRGR +-KUB: CPT | Mod: 26 | Performed by: UROLOGY

## 2023-09-23 PROCEDURE — G0378 HOSPITAL OBSERVATION PER HR: HCPCS

## 2023-09-23 PROCEDURE — C1758 CATHETER, URETERAL: HCPCS | Performed by: UROLOGY

## 2023-09-23 PROCEDURE — 250N000009 HC RX 250: Performed by: NURSE ANESTHETIST, CERTIFIED REGISTERED

## 2023-09-23 PROCEDURE — 80048 BASIC METABOLIC PNL TOTAL CA: CPT | Performed by: INTERNAL MEDICINE

## 2023-09-23 PROCEDURE — 272N000001 HC OR GENERAL SUPPLY STERILE: Performed by: UROLOGY

## 2023-09-23 PROCEDURE — 360N000083 HC SURGERY LEVEL 3 W/ FLUORO, PER MIN: Performed by: UROLOGY

## 2023-09-23 PROCEDURE — 99207 PR APP CREDIT; MD BILLING SHARED VISIT: CPT | Performed by: PHYSICIAN ASSISTANT

## 2023-09-23 PROCEDURE — 258N000001 HC RX 258: Performed by: UROLOGY

## 2023-09-23 PROCEDURE — 99235 HOSP IP/OBS SAME DATE MOD 70: CPT | Performed by: INTERNAL MEDICINE

## 2023-09-23 DEVICE — STENT URETERAL POLARIS ULTRA 6FRX28CM M0061921340
Type: IMPLANTABLE DEVICE | Site: URETER | Status: NON-FUNCTIONAL
Removed: 2023-10-06

## 2023-09-23 RX ORDER — ONDANSETRON 2 MG/ML
4 INJECTION INTRAMUSCULAR; INTRAVENOUS EVERY 30 MIN PRN
Status: DISCONTINUED | OUTPATIENT
Start: 2023-09-23 | End: 2023-09-23 | Stop reason: HOSPADM

## 2023-09-23 RX ORDER — ONDANSETRON 2 MG/ML
INJECTION INTRAMUSCULAR; INTRAVENOUS PRN
Status: DISCONTINUED | OUTPATIENT
Start: 2023-09-23 | End: 2023-09-23

## 2023-09-23 RX ORDER — OXYCODONE HYDROCHLORIDE 10 MG/1
10 TABLET ORAL
Status: DISCONTINUED | OUTPATIENT
Start: 2023-09-23 | End: 2023-09-23 | Stop reason: HOSPADM

## 2023-09-23 RX ORDER — NALOXONE HYDROCHLORIDE 0.4 MG/ML
0.4 INJECTION, SOLUTION INTRAMUSCULAR; INTRAVENOUS; SUBCUTANEOUS
Status: DISCONTINUED | OUTPATIENT
Start: 2023-09-23 | End: 2023-09-23 | Stop reason: HOSPADM

## 2023-09-23 RX ORDER — ONDANSETRON 4 MG/1
4 TABLET, ORALLY DISINTEGRATING ORAL EVERY 30 MIN PRN
Status: DISCONTINUED | OUTPATIENT
Start: 2023-09-23 | End: 2023-09-23 | Stop reason: HOSPADM

## 2023-09-23 RX ORDER — CEFAZOLIN SODIUM/WATER 2 G/20 ML
2 SYRINGE (ML) INTRAVENOUS SEE ADMIN INSTRUCTIONS
Status: DISCONTINUED | OUTPATIENT
Start: 2023-09-23 | End: 2023-09-23 | Stop reason: HOSPADM

## 2023-09-23 RX ORDER — ACETAMINOPHEN 325 MG/1
975 TABLET ORAL EVERY 8 HOURS
Status: DISCONTINUED | OUTPATIENT
Start: 2023-09-23 | End: 2023-09-23 | Stop reason: HOSPADM

## 2023-09-23 RX ORDER — HYDROMORPHONE HCL IN WATER/PF 6 MG/30 ML
0.2 PATIENT CONTROLLED ANALGESIA SYRINGE INTRAVENOUS
Status: DISCONTINUED | OUTPATIENT
Start: 2023-09-23 | End: 2023-09-23 | Stop reason: HOSPADM

## 2023-09-23 RX ORDER — OXYCODONE HYDROCHLORIDE 5 MG/1
5 TABLET ORAL EVERY 4 HOURS PRN
Status: DISCONTINUED | OUTPATIENT
Start: 2023-09-23 | End: 2023-09-23 | Stop reason: HOSPADM

## 2023-09-23 RX ORDER — NALOXONE HYDROCHLORIDE 0.4 MG/ML
0.2 INJECTION, SOLUTION INTRAMUSCULAR; INTRAVENOUS; SUBCUTANEOUS
Status: DISCONTINUED | OUTPATIENT
Start: 2023-09-23 | End: 2023-09-23 | Stop reason: HOSPADM

## 2023-09-23 RX ORDER — LIDOCAINE HYDROCHLORIDE 20 MG/ML
INJECTION, SOLUTION INFILTRATION; PERINEURAL PRN
Status: DISCONTINUED | OUTPATIENT
Start: 2023-09-23 | End: 2023-09-23

## 2023-09-23 RX ORDER — MAGNESIUM SULFATE HEPTAHYDRATE 40 MG/ML
2 INJECTION, SOLUTION INTRAVENOUS
Status: DISCONTINUED | OUTPATIENT
Start: 2023-09-23 | End: 2023-09-23 | Stop reason: HOSPADM

## 2023-09-23 RX ORDER — ALBUTEROL SULFATE 0.83 MG/ML
2.5 SOLUTION RESPIRATORY (INHALATION) EVERY 4 HOURS PRN
Status: DISCONTINUED | OUTPATIENT
Start: 2023-09-23 | End: 2023-09-23 | Stop reason: HOSPADM

## 2023-09-23 RX ORDER — DIPHENHYDRAMINE HCL 12.5MG/5ML
12.5 LIQUID (ML) ORAL EVERY 6 HOURS PRN
Status: DISCONTINUED | OUTPATIENT
Start: 2023-09-23 | End: 2023-09-23 | Stop reason: HOSPADM

## 2023-09-23 RX ORDER — OXYCODONE HYDROCHLORIDE 5 MG/1
5 TABLET ORAL
Status: DISCONTINUED | OUTPATIENT
Start: 2023-09-23 | End: 2023-09-23 | Stop reason: HOSPADM

## 2023-09-23 RX ORDER — KETOROLAC TROMETHAMINE 30 MG/ML
INJECTION, SOLUTION INTRAMUSCULAR; INTRAVENOUS PRN
Status: DISCONTINUED | OUTPATIENT
Start: 2023-09-23 | End: 2023-09-23

## 2023-09-23 RX ORDER — KETOROLAC TROMETHAMINE 15 MG/ML
15 INJECTION, SOLUTION INTRAMUSCULAR; INTRAVENOUS
Status: DISCONTINUED | OUTPATIENT
Start: 2023-09-23 | End: 2023-09-23 | Stop reason: HOSPADM

## 2023-09-23 RX ORDER — LIDOCAINE 40 MG/G
CREAM TOPICAL
Status: DISCONTINUED | OUTPATIENT
Start: 2023-09-23 | End: 2023-09-23 | Stop reason: HOSPADM

## 2023-09-23 RX ORDER — CEFAZOLIN SODIUM/WATER 2 G/20 ML
2 SYRINGE (ML) INTRAVENOUS
Status: COMPLETED | OUTPATIENT
Start: 2023-09-23 | End: 2023-09-23

## 2023-09-23 RX ORDER — FENTANYL CITRATE 50 UG/ML
INJECTION, SOLUTION INTRAMUSCULAR; INTRAVENOUS PRN
Status: DISCONTINUED | OUTPATIENT
Start: 2023-09-23 | End: 2023-09-23

## 2023-09-23 RX ORDER — AMLODIPINE BESYLATE 5 MG/1
5 TABLET ORAL DAILY
COMMUNITY
Start: 2023-09-23

## 2023-09-23 RX ORDER — KETOROLAC TROMETHAMINE 15 MG/ML
15 INJECTION, SOLUTION INTRAMUSCULAR; INTRAVENOUS ONCE
Status: DISCONTINUED | OUTPATIENT
Start: 2023-09-23 | End: 2023-09-23

## 2023-09-23 RX ORDER — HYDRALAZINE HYDROCHLORIDE 20 MG/ML
5-10 INJECTION INTRAMUSCULAR; INTRAVENOUS EVERY 10 MIN PRN
Status: DISCONTINUED | OUTPATIENT
Start: 2023-09-23 | End: 2023-09-23 | Stop reason: HOSPADM

## 2023-09-23 RX ORDER — LABETALOL HYDROCHLORIDE 5 MG/ML
10 INJECTION, SOLUTION INTRAVENOUS
Status: DISCONTINUED | OUTPATIENT
Start: 2023-09-23 | End: 2023-09-23 | Stop reason: HOSPADM

## 2023-09-23 RX ORDER — FENTANYL CITRATE 50 UG/ML
50 INJECTION, SOLUTION INTRAMUSCULAR; INTRAVENOUS EVERY 5 MIN PRN
Status: DISCONTINUED | OUTPATIENT
Start: 2023-09-23 | End: 2023-09-23 | Stop reason: HOSPADM

## 2023-09-23 RX ORDER — ONDANSETRON 4 MG/1
4 TABLET, ORALLY DISINTEGRATING ORAL EVERY 6 HOURS PRN
Status: DISCONTINUED | OUTPATIENT
Start: 2023-09-23 | End: 2023-09-23 | Stop reason: HOSPADM

## 2023-09-23 RX ORDER — ONDANSETRON 2 MG/ML
4 INJECTION INTRAMUSCULAR; INTRAVENOUS EVERY 6 HOURS PRN
Status: DISCONTINUED | OUTPATIENT
Start: 2023-09-23 | End: 2023-09-23 | Stop reason: HOSPADM

## 2023-09-23 RX ORDER — AMOXICILLIN 250 MG
2 CAPSULE ORAL 2 TIMES DAILY PRN
Status: DISCONTINUED | OUTPATIENT
Start: 2023-09-23 | End: 2023-09-23 | Stop reason: HOSPADM

## 2023-09-23 RX ORDER — LOSARTAN POTASSIUM 50 MG/1
50 TABLET ORAL DAILY
COMMUNITY
Start: 2023-09-23

## 2023-09-23 RX ORDER — SODIUM CHLORIDE, SODIUM LACTATE, POTASSIUM CHLORIDE, CALCIUM CHLORIDE 600; 310; 30; 20 MG/100ML; MG/100ML; MG/100ML; MG/100ML
INJECTION, SOLUTION INTRAVENOUS CONTINUOUS
Status: DISCONTINUED | OUTPATIENT
Start: 2023-09-23 | End: 2023-09-23 | Stop reason: HOSPADM

## 2023-09-23 RX ORDER — MORPHINE SULFATE 4 MG/ML
4 INJECTION, SOLUTION INTRAMUSCULAR; INTRAVENOUS
Status: DISCONTINUED | OUTPATIENT
Start: 2023-09-23 | End: 2023-09-23

## 2023-09-23 RX ORDER — KETOROLAC TROMETHAMINE 15 MG/ML
15 INJECTION, SOLUTION INTRAMUSCULAR; INTRAVENOUS EVERY 6 HOURS PRN
Status: CANCELLED | OUTPATIENT
Start: 2023-09-23

## 2023-09-23 RX ORDER — PROPOFOL 10 MG/ML
INJECTION, EMULSION INTRAVENOUS PRN
Status: DISCONTINUED | OUTPATIENT
Start: 2023-09-23 | End: 2023-09-23

## 2023-09-23 RX ORDER — TAMSULOSIN HYDROCHLORIDE 0.4 MG/1
0.4 CAPSULE ORAL DAILY
Qty: 14 CAPSULE | Refills: 0 | Status: SHIPPED | OUTPATIENT
Start: 2023-09-23

## 2023-09-23 RX ORDER — HYDROMORPHONE HCL IN WATER/PF 6 MG/30 ML
0.4 PATIENT CONTROLLED ANALGESIA SYRINGE INTRAVENOUS
Status: DISCONTINUED | OUTPATIENT
Start: 2023-09-23 | End: 2023-09-23 | Stop reason: HOSPADM

## 2023-09-23 RX ORDER — AMOXICILLIN 250 MG
1 CAPSULE ORAL 2 TIMES DAILY PRN
Status: DISCONTINUED | OUTPATIENT
Start: 2023-09-23 | End: 2023-09-23 | Stop reason: HOSPADM

## 2023-09-23 RX ORDER — DEXAMETHASONE SODIUM PHOSPHATE 4 MG/ML
INJECTION, SOLUTION INTRA-ARTICULAR; INTRALESIONAL; INTRAMUSCULAR; INTRAVENOUS; SOFT TISSUE PRN
Status: DISCONTINUED | OUTPATIENT
Start: 2023-09-23 | End: 2023-09-23

## 2023-09-23 RX ORDER — DIPHENHYDRAMINE HYDROCHLORIDE 50 MG/ML
12.5 INJECTION INTRAMUSCULAR; INTRAVENOUS EVERY 6 HOURS PRN
Status: DISCONTINUED | OUTPATIENT
Start: 2023-09-23 | End: 2023-09-23 | Stop reason: HOSPADM

## 2023-09-23 RX ADMIN — Medication 2 G: at 07:55

## 2023-09-23 RX ADMIN — KETOROLAC TROMETHAMINE 15 MG: 30 INJECTION, SOLUTION INTRAMUSCULAR at 07:57

## 2023-09-23 RX ADMIN — DEXAMETHASONE SODIUM PHOSPHATE 8 MG: 4 INJECTION, SOLUTION INTRA-ARTICULAR; INTRALESIONAL; INTRAMUSCULAR; INTRAVENOUS; SOFT TISSUE at 07:57

## 2023-09-23 RX ADMIN — PROPOFOL 150 MG: 10 INJECTION, EMULSION INTRAVENOUS at 07:57

## 2023-09-23 RX ADMIN — ONDANSETRON 4 MG: 2 INJECTION INTRAMUSCULAR; INTRAVENOUS at 07:57

## 2023-09-23 RX ADMIN — FENTANYL CITRATE 100 MCG: 50 INJECTION, SOLUTION INTRAMUSCULAR; INTRAVENOUS at 07:57

## 2023-09-23 RX ADMIN — LIDOCAINE HYDROCHLORIDE 50 MG: 20 INJECTION, SOLUTION INFILTRATION; PERINEURAL at 07:57

## 2023-09-23 RX ADMIN — PROPOFOL 20 MG: 10 INJECTION, EMULSION INTRAVENOUS at 07:59

## 2023-09-23 RX ADMIN — PHENYLEPHRINE HYDROCHLORIDE 100 MCG: 10 INJECTION INTRAVENOUS at 08:07

## 2023-09-23 RX ADMIN — SODIUM CHLORIDE, POTASSIUM CHLORIDE, SODIUM LACTATE AND CALCIUM CHLORIDE: 600; 310; 30; 20 INJECTION, SOLUTION INTRAVENOUS at 07:54

## 2023-09-23 RX ADMIN — PHENYLEPHRINE HYDROCHLORIDE 100 MCG: 10 INJECTION INTRAVENOUS at 08:14

## 2023-09-23 ASSESSMENT — ACTIVITIES OF DAILY LIVING (ADL)
ADLS_ACUITY_SCORE: 31
ADLS_ACUITY_SCORE: 31
ADLS_ACUITY_SCORE: 35
ADLS_ACUITY_SCORE: 32
ADLS_ACUITY_SCORE: 32
ADLS_ACUITY_SCORE: 31
ADLS_ACUITY_SCORE: 32

## 2023-09-23 NOTE — CONSULTS
September 23, 2023    UROLOGY CONSULT    Referring Provider: Dr Shmuel Fink    Primary Care Provider: Daly Bedoya    Assessment & Plan     Nephrolithiasis  Given the size and location of the stone she is unlikely to pass and recommend ureteral stent in the AM.  No obvious signs or symptoms of infection at this time.    We discussed that the risks to the procedure include but not limited to cardiovascular risks of anesthesia, nerve injury, bleeding, infection, injury to adjacent organs including bowel, bladder, and blood vessels, inability to place retrograde stent requiring antegrade decompression, need for future definitive stone treatment.  Patient expressed understanding and agreeable to proceed with cystoscopy left retrograde pyleogram, left ureteral stent placement    40 minutes were spent on this day of the encounter in reviewing the EMR including interpretation of the CT scan, reviewing urinalysis, CBC, BMP and ED physician Dr Fink's notes, direct patient care, coordination of care and documentation    Shreya Dangelo MD MPH  (she/her/hers)   of Urology  [facility]      HPI:  Scarlet Chapa is a 68 year old female who presented to the ED after an renal US done by her PCP showed severe hydronephrosis.  She had the US done given her creatinine had bumped up to 1.6.  Patient presented without fevers, chills, nausea, vomiting, hematuria or flank pain.  Currently feels comfortable without any pain.  Did have recent right hip surgery in August    She has never had kidney stones in the past.  Thinks her dad may have had them.    Past Medical History:   Diagnosis Date    Arthritis     Chronic kidney disease, stage III (moderate) (H)     Hyperlipidemia     Hypertension     Insomnia     Obese      Past Surgical History:   Procedure Laterality Date    ARTHROPLASTY HIP Left 8/4/2023    Procedure: LEFT POSTERIOR TOTAL HIP ARTHROPLASTY;  Surgeon: Nitin Carrero MD;   Location: [facility] Main OR    COLONOSCOPY N/A 11/1/2017    Procedure: COLONOSCOPY;  colonoscopy;  Surgeon: Haile Whitman MD;  Location: [facility]    ORTHOPEDIC SURGERY Right 12/2015    Hip athroplasty     Social History     Socioeconomic History    Marital status: Single     Spouse name: Not on file    Number of children: Not on file    Years of education: Not on file    Highest education level: Not on file   Occupational History    Not on file   Tobacco Use    Smoking status: Never    Smokeless tobacco: Never   Substance and Sexual Activity    Alcohol use: Yes     Comment: rare    Drug use: No    Sexual activity: Not on file   Other Topics Concern    Parent/sibling w/ CABG, MI or angioplasty before 65F 55M? Not Asked   Social History Narrative    Not on file     Social Determinants of Health     Financial Resource Strain: Not on file   Food Insecurity: Not on file   Transportation Needs: Not on file   Physical Activity: Not on file   Stress: Not on file   Social Connections: Not on file   Interpersonal Safety: Not on file   Housing Stability: Not on file     Family History   Problem Relation Age of Onset    Colon Cancer No family hx of      Allergies   Allergen Reactions    Gluten Meal Other (See Comments)     abd bloating, gas    [place]in Other (See Comments)     Sneezing, watery eyes     Current Facility-Administered Medications   Medication    ketorolac (TORADOL) injection 15 mg    morphine (PF) injection 4 mg     Current Outpatient Medications   Medication    acetaminophen (TYLENOL) 500 MG tablet    ascorbic acid 1000 MG TABS tablet    aspirin 81 MG EC tablet    cefadroxil (DURICEF) 500 MG capsule    cholecalciferol 50 MCG (2000 UT) CAPS    coenzyme Q-10 100 MG TABS    cyanocobalamin (VITAMIN B-12) 500 MCG tablet    diclofenac (VOLTAREN) 1 % topical gel    fish oil-omega-3 fatty acids 1000 MG capsule    hydrOXYzine (ATARAX) 10 MG tablet    lactobacillus rhamnosus, GG, (CULTURELL) capsule     "losartan-hydrochlorothiazide (HYZAAR) 50-12.5 MG tablet    magnesium 100 MG TABS    methocarbamol (ROBAXIN) 500 MG tablet    oxyCODONE (ROXICODONE) 5 MG tablet    senna-docusate (SENOKOT-S/PERICOLACE) 8.6-50 MG tablet     BP (!) 186/91   Pulse 104   Temp 97.4  F (36.3  C) (Temporal)   Resp 20   Ht 1.626 m (5' 4\")   Wt 98.4 kg (217 lb)   SpO2 98%   BMI 37.25 kg/m    GENERAL: healthy, alert and no distress  EYES: Eyes grossly normal to inspection, conjunctivae and sclerae normal  HENT: normal cephalic/atraumatic.  External ears, nose and mouth without ulcers or lesions.  RESP: no audible wheeze, cough, or visible cyanosis.  No visible retractions or increased work of breathing.  Able to speak fully in complete sentences.  NEURO: Cranial nerves grossly intact, mentation intact and speech normal  PSYCH: mentation appears normal, affect normal/bright, judgement and insight intact, normal speech and appearance well-groomed  ABD: soft, nontender, nondistended    Labs    Urine dip 1 RBC, 13 WBC, 1 squamous cell-nitrite negative    CT scan shows a 9 x 9 x 12mm mid ureteral stone on her left along with some other renal pelvis stones with marked hydronephrosis and hydroureter, no perinephric stranding    WBC 5.9    Cr 1.16    CC  Patient Care Team:  Daly Bedoya MD as PCP - General (Family Medicine)                "

## 2023-09-23 NOTE — PLAN OF CARE
PRIMARY DIAGNOSIS: ACUTE RENAL COLIC     OUTPATIENT/OBSERVATION GOALS TO BE MET BEFORE DISCHARGE  1. Pain Status: Pain free.     2. Tolerating adequate PO diet:  NPO for possible procedure     3. Surgical Intervention planned: Yes     4. Cleared by consultants (if involved): No     5. Return to near baseline physical activity: Yes     Patient just back from Cysto with stent placement. Alert and Oriented x4. Denies any pain. IVF infusing. tolerating clears. She is independent with no assistive devices. Unsure if she has someone to spend the night with her.

## 2023-09-23 NOTE — ED TRIAGE NOTES
Pt had a renal ultrasound at 1700. Pt has a a kidney stone in the left kidney that shows hydronephrosis.

## 2023-09-23 NOTE — ANESTHESIA PROCEDURE NOTES
Airway       Patient location during procedure: OR       Procedure Start/Stop Times: 9/23/2023 8:00 AM  Staff -        CRNA: Nitin Dempsey APRN CRNA       Performed By: CRNAIndications and Patient Condition       Indications for airway management: patricia-procedural and airway protection       Induction type:intravenous       Mask difficulty assessment: 1 - vent by mask    Final Airway Details       Final airway type: supraglottic airway    Supraglottic Airway Details        Type: LMA       Brand: I-Gel       LMA size: 4    Post intubation assessment        Placement verified by: capnometry, equal breath sounds and chest rise        Number of attempts at approach: 1       Secured with: commercial tube cuevas       Ease of procedure: easy       Dentition: Intact    Medication(s) Administered   Medication Administration Time: 9/23/2023 8:00 AM

## 2023-09-23 NOTE — ED NOTES
"[facility]  ED Nurse Handoff Report    ED Chief complaint: Flank Pain  . ED Diagnosis:   Final diagnoses:   Kidney stone   Hydronephrosis with urinary obstruction due to ureteral calculus       Allergies:   Allergies   Allergen Reactions    Gluten Meal Other (See Comments)     abd bloating, gas    Casein Other (See Comments)     Sneezing, watery eyes       Code Status: Full Code    Activity level - Baseline/Home:  independent.  Activity Level - Current:   independent.   Lift room needed: No.   Bariatric: No   Needed: No   Isolation: No.   Infection: Not Applicable.     Respiratory status: Room air    Vital Signs (within 30 minutes):   Vitals:    09/22/23 2025   BP: (!) 186/91   Pulse: 104   Resp: 20   Temp: 97.4  F (36.3  C)   TempSrc: Temporal   SpO2: 98%   Weight: 98.4 kg (217 lb)   Height: 1.626 m (5' 4\")       Cardiac Rhythm:  ,      Pain level:    Patient confused: No.   Patient Falls Risk: nonskid shoes/slippers when out of bed.   Elimination Status:  Has not voided in ED      Patient Report - Initial Complaint: Flank pain.   Focused Assessment: Scarlet Chapa is a 68 year old female with a history of hypertension, hyperlipidemia, and CKD who presents with back pain. Patient was recently diagnosed with early to mild chronic kidney disease and in preparation to seeing nephrology, her primary care provider ordered tests including a renal ultrasound that showed left-sided hydronephrosis. Her provider told her to come to the ER. Patient states that she has been experiencing minimal symtoms that include mild bilateral back pain for the past few days and feeling warm and as though she \"might have a cold.\" She also notes increased urinary frequency, though she is not producing a lot of urine. Patient is having minimal left flank pain. She denies dysuria, hematuria, abdominal pain, fever, chills, nausea, or vomiting. Negative history of abdominal surgery. She notes a recent hip " replacement that occurred in August.      Abnormal Results:   Labs Ordered and Resulted from Time of ED Arrival to Time of ED Departure   ROUTINE UA WITH MICROSCOPIC REFLEX TO CULTURE - Abnormal       Result Value    Color Urine Light Yellow      Appearance Urine Clear      Glucose Urine Negative      Bilirubin Urine Negative      Ketones Urine Negative      Specific Gravity Urine 1.011      Blood Urine Negative      pH Urine 5.0      Protein Albumin Urine Negative      Urobilinogen Urine Normal      Nitrite Urine Negative      Leukocyte Esterase Urine Large (*)     RBC Urine 1      WBC Urine 13 (*)     Squamous Epithelials Urine <1     CBC WITH PLATELETS AND DIFFERENTIAL    WBC Count 5.9      RBC Count 4.27      Hemoglobin 12.9      Hematocrit 36.9      MCV 86      MCH 30.2      MCHC 35.0      RDW 13.2      Platelet Count 208      % Neutrophils 60      % Lymphocytes 24      % Monocytes 11      % Eosinophils 4      % Basophils 1      % Immature Granulocytes 0      NRBCs per 100 WBC 0      Absolute Neutrophils 3.6      Absolute Lymphocytes 1.4      Absolute Monocytes 0.6      Absolute Eosinophils 0.2      Absolute Basophils 0.0      Absolute Immature Granulocytes 0.0      Absolute NRBCs 0.0     BASIC METABOLIC PANEL   URINE CULTURE        Abd/pelvis CT no contrast - Stone Protocol   Final Result   IMPRESSION:    1.  9.1 x 9.1 x 12.6 mm stone in the left mid ureter causing marked hydronephrosis and hydroureter to additional stones in the lower pole collecting system left kidney. No right renal calculi noted. No additional stones are seen along the courses of the    ureters.      2.  Bilateral hip arthroplasties with associated streak artifact obscuring details of the mid and lower abdomen.      3.  5.7 x 3.8 cm and 4.5 x 4.2 cm calcified fibroids in the uterus. No pelvic free fluid.      4.  3 x 2 mm nodule in the posterior medial right lung base. Based on appearance, size and location no follow-up needed.       5.   Small hiatal hernia.             Treatments provided: Lab work, CT  Family Comments: NA  OBS brochure/video discussed/provided to patient:  Yes  ED Medications:   Medications   ketorolac (TORADOL) injection 15 mg (has no administration in time range)   morphine (PF) injection 4 mg (has no administration in time range)       Drips infusing:  No  For the majority of the shift this patient was Green.   Interventions performed were NA.    Sepsis treatment initiated: No    Cares/treatment/interventions/medications to be completed following ED care: See MAR    ED Nurse Name: Ara Marquez RN  1:26 AM    RECEIVING UNIT ED HANDOFF REVIEW    Above ED Nurse Handoff Report was reviewed: Yes  Reviewed by: Sandra Mejia RN on September 23, 2023 at 1:39 AM

## 2023-09-23 NOTE — ED PROVIDER NOTES
"  History     Chief Complaint:  Back pain      The history is provided by the patient.      Scarlet Chapa is a 68 year old female with a history of hypertension, hyperlipidemia, and CKD who presents with back pain. Patient was recently diagnosed with early to mild chronic kidney disease and in preparation to seeing nephrology, her primary care provider ordered tests including a renal ultrasound that showed left-sided hydronephrosis. Her provider told her to come to the ER. Patient states that she has been experiencing minimal symtoms that include mild bilateral back pain for the past few days and feeling warm and as though she \"might have a cold.\" She also notes increased urinary frequency, though she is not producing a lot of urine. Patient is having minimal left flank pain. She denies dysuria, hematuria, abdominal pain, fever, chills, nausea, or vomiting. Negative history of abdominal surgery. She notes a recent hip replacement that occurred in August.     Independent Historian:    None- patient only     Review of External Notes:  Reviewed patient's bilateral renal ultrasound from 9/22/23 which showed moderate left hydronephrosis with multiple shadowing left renal stones.     Medications:    Hydroxyzine   Losartan   Methocarbamol   Senna-docusate  Amlodipine     Past Medical History:    Arthritis  Chronic kidney disease, stage III  Hyperlipidemia  Hypertension  Insomnia   Perforated ear drum, left     Past Surgical History:    Left hip arthroplasty   Right hip arthroplasty       Physical Exam   Patient Vitals for the past 24 hrs:   BP Temp Temp src Pulse Resp SpO2 Height Weight   09/23/23 0206 (!) 173/85 -- -- 83 -- -- -- --   09/23/23 0152 (!) 188/94 98  F (36.7  C) Oral 95 20 97 % 1.638 m (5' 4.5\") 98.2 kg (216 lb 8 oz)   09/22/23 2025 (!) 186/91 97.4  F (36.3  C) Temporal 104 20 98 % 1.626 m (5' 4\") 98.4 kg (217 lb)        Physical Exam  General: Patient is awake, alert  Neck: Normal range of motion.   CV: " Tachycardic but regular  Resp: Lungs are clear without wheezes or rales. No respiratory distress.   GI: Abdomen is soft, no rigidity, guarding, or rebound. No distension. No tenderness to palpation in any quadrant.    MS: Normal tone. Joints grossly normal without effusions. No asymmetric leg swelling, calf or thigh tenderness.    Skin: No rash or lesions noted. Normal capillary refill noted  Neuro: Speech is normal and fluent. Face is symmetric. Moving all extremities.   Psych:  Normal affect.  Appropriate interactions.     Emergency Department Course     Imaging:  Abd/pelvis CT no contrast - Stone Protocol   Final Result   IMPRESSION:    1.  9.1 x 9.1 x 12.6 mm stone in the left mid ureter causing marked hydronephrosis and hydroureter to additional stones in the lower pole collecting system left kidney. No right renal calculi noted. No additional stones are seen along the courses of the    ureters.      2.  Bilateral hip arthroplasties with associated streak artifact obscuring details of the mid and lower abdomen.      3.  5.7 x 3.8 cm and 4.5 x 4.2 cm calcified fibroids in the uterus. No pelvic free fluid.      4.  3 x 2 mm nodule in the posterior medial right lung base. Based on appearance, size and location no follow-up needed.       5.  Small hiatal hernia.           Report per radiology    Laboratory:  Labs Ordered and Resulted from Time of ED Arrival to Time of ED Departure   BASIC METABOLIC PANEL - Abnormal       Result Value    Sodium 134 (*)     Potassium 4.0      Chloride 100      Carbon Dioxide (CO2) 19 (*)     Anion Gap 15      Urea Nitrogen 30.7 (*)     Creatinine 1.16 (*)     Calcium 9.7      Glucose 91      GFR Estimate 52 (*)    ROUTINE UA WITH MICROSCOPIC REFLEX TO CULTURE - Abnormal    Color Urine Light Yellow      Appearance Urine Clear      Glucose Urine Negative      Bilirubin Urine Negative      Ketones Urine Negative      Specific Gravity Urine 1.011      Blood Urine Negative      pH Urine  5.0      Protein Albumin Urine Negative      Urobilinogen Urine Normal      Nitrite Urine Negative      Leukocyte Esterase Urine Large (*)     RBC Urine 1      WBC Urine 13 (*)     Squamous Epithelials Urine <1     CBC WITH PLATELETS AND DIFFERENTIAL    WBC Count 5.9      RBC Count 4.27      Hemoglobin 12.9      Hematocrit 36.9      MCV 86      MCH 30.2      MCHC 35.0      RDW 13.2      Platelet Count 208      % Neutrophils 60      % Lymphocytes 24      % Monocytes 11      % Eosinophils 4      % Basophils 1      % Immature Granulocytes 0      NRBCs per 100 WBC 0      Absolute Neutrophils 3.6      Absolute Lymphocytes 1.4      Absolute Monocytes 0.6      Absolute Eosinophils 0.2      Absolute Basophils 0.0      Absolute Immature Granulocytes 0.0      Absolute NRBCs 0.0     URINE CULTURE        Emergency Department Course & Assessments:     Interventions:  Medications   ketorolac (TORADOL) injection 15 mg ( Intravenous Canceled Entry 9/23/23 0256)   melatonin tablet 1 mg (has no administration in time range)   ondansetron (ZOFRAN ODT) ODT tab 4 mg (has no administration in time range)     Or   ondansetron (ZOFRAN) injection 4 mg (has no administration in time range)   acetaminophen (TYLENOL) tablet 975 mg (975 mg Oral Not Given 9/23/23 0218)   oxyCODONE IR (ROXICODONE) half-tab 2.5 mg (has no administration in time range)   oxyCODONE (ROXICODONE) tablet 5 mg (has no administration in time range)   HYDROmorphone (DILAUDID) injection 0.2 mg (has no administration in time range)   HYDROmorphone (DILAUDID) injection 0.4 mg (has no administration in time range)   senna-docusate (SENOKOT-S/PERICOLACE) 8.6-50 MG per tablet 1 tablet (has no administration in time range)     Or   senna-docusate (SENOKOT-S/PERICOLACE) 8.6-50 MG per tablet 2 tablet (has no administration in time range)   naloxone (NARCAN) injection 0.2 mg (has no administration in time range)     Or   naloxone (NARCAN) injection 0.4 mg (has no administration in  time range)     Or   naloxone (NARCAN) injection 0.2 mg (has no administration in time range)     Or   naloxone (NARCAN) injection 0.4 mg (has no administration in time range)        Assessments:  2345 I obtained history and examined the patient as noted above.   0025 I rechecked and updated the patient. Patient is compliant with plans for admission.     Consultations/Discussion of Management or Tests:  0035 I consulted with Dr. Shreya Dangelo from urology.   0121 I consulted with hospitalist Dr. Randall. Patient will be admitted.        Social Determinants of Health affecting care:  None      Disposition:  The patient was admitted to the hospital under the care of Nitin Randall MD.    Impression & Plan      Medical Decision Making:  Patient is a 68-year-old woman with past medical history of hypertension, hyperlipidemia and chronic kidney disease who presents to the emergency department with back pain.  Patient was initially evaluated by her primary care doctor as an outpatient and was found to have evidence of acute on chronic renal injury.  She was sent for renal ultrasound which showed significant left-sided hydronephrosis with prompted her to come to the emergency department for further evaluation.  On exam here she reports some mild bilateral back pain for the last few days as well as some increased urinary frequency but no significant dysuria or hematuria.  CT scan was obtained here which shows a large left-sided mid ureter kidney stone that is 9 x 9 x 12 mm.  There is significant hydroureter and hydronephrosis.  Urine is not indicative of infection at this time.  However given her significant urinary obstruction if she develops fever or significant decline in overall presentation would start antibiotics and expedite her to the operating room with urology.  Case was discussed with urology who will perform stent in the morning.  She will be n.p.o. at this time.  Patient will be admitted for  further urology consultation and treatment of her large left-sided kidney stone.    Diagnosis:    ICD-10-CM    1. Nephrolithiasis  N20.0 Case Request: CYSTOSCOPY, WITH RETROGRADE PYELOGRAM AND URETERAL STENT REPLACEMENT     Case Request: CYSTOSCOPY, WITH RETROGRADE PYELOGRAM AND URETERAL STENT REPLACEMENT      2. Kidney stone  N20.0       3. Hydronephrosis with urinary obstruction due to ureteral calculus  N13.2            Scribe Disclosure:  I, Guadalupe Escobedo, am serving as a scribe at 11:48 PM on 9/22/2023 to document services personally performed by Shmuel Fink MD based on my observations and the provider's statements to me.    9/22/2023   Shmuel Fink MD Battista, Christopher Joseph, MD  09/23/23 0637

## 2023-09-23 NOTE — PROGRESS NOTES
Patient's After Visit Summary was reviewed with patient.   Patient verbalized understanding of After Visit Summary, recommended follow up and was given an opportunity to ask questions.   Discharge medications sent home with patient/family: Rx sent to pt's pharmacy   Discharged with brother    OBSERVATION patient END time: 6589

## 2023-09-23 NOTE — OP NOTE
September 23, 2023    Preoperative diagnosis:   Left hydronephrosis  Left nephrolithiasis  Chronic kidney disease  Hypertension    Postoperative diagnosis: Same    Procedure: Cystoscopy with left retrograde pyelogram, left ureteral stent placement, intraoperative interpretation of fluoroscopic imaging    Surgeon: Shreya Dangelo MD     Assistant: None    Anesthesia: General    EBL: none  UOP: not recorded  IVF: please see anesthesia record    Drains: 6x 28 Fr double J stent    Complications: None noted    Specimens: left renal pelvis urine for culture    Findings: Hydronephrotic drip, severe hydro, stent placed with some resistance at midureter at the level of the stone    Indication for procedure: Scarlet Chapa is 68 year old who presented with 9x9x12 mm left mid ureteral stone with severe hydronephrosis and additional renal pelvis stones.  Given the size of the stone and the fact her creatinine seems to be slightly elevated recommendation was for ureteral stenting which she is agreeable to.    Summary of procedure:  After patient was identified in the pre-operative area and procedure verified, informed consent was obtained.  After this patient was taken to the operating suite and placed in the supine position.  Intravenous patricia-operative antibiotics were administered by anesthesia.  After appropriate anesthesia was induced and the airway secured, patient was placed in the dorsal lithotomy position in supportive yellow-fin stirrups with careful attention to neural safety in positioning of all four extremities.  At this time patient was prepped and draped in the standard fashion.      A time out was performed to confirm patient and procedure.    A 22 Fr rigid cystoscope was inserted into a normal appearing urethral meatus.  The urine media was clear.  The urothelium was cursorly examined and there were no obvious tumors, stones, foreign body or other urothelial abnormalities noted. Bilateral ureteral orifices were  noted in the normal orthotopic position .  The left ureteral orifice was identified and cannulated with the wire.  Initially there was some resistance at the level of the stone but when the open ended was brought up into the area the wire then was able to pass into the renal pelvis.  The 5 Fr open ended then also was able to be passed into the renal pelvis.  Wire removed and a hydronephrotic drip was noted and some urine was sent for urine culture.  At this time the wire was replaced, the open ended removed and a 6x 28 Fr double J stent was placed under direct visualization.  The wire was removed and appropriate curls were noted in both the renal pelvis and bladder.  Bladder was then drained    The procedure terminated.  Counts were reported as correct    Patient went to the recovery room in good condition    I spoke to her brother Haile at the end of the case    Plan:   She will need to have urologic follow up as an outpatient for definitive stone treatment    Transfer back to floor  Flomax and anticholinergics as needed for discomfort  Please check a PVR to ensure she is emptying  She will need outpatient follow up for treatment of her stones (message sent to our scheduling team)    Shreya Dangelo MD MPH  (she/her/hers)   of Urology  [facility]

## 2023-09-23 NOTE — ANESTHESIA CARE TRANSFER NOTE
Patient: Scarlet Chapa    Procedure: Procedure(s):  Cystoscopy, retrogrades, insert stent left ureter       Diagnosis: Flank pain [R10.9]  Diagnosis Additional Information: No value filed.    Anesthesia Type:   General     Note:    Oropharynx: oropharynx clear of all foreign objects  Level of Consciousness: awake  Oxygen Supplementation: room air    Independent Airway: airway patency satisfactory and stable  Dentition: dentition unchanged  Vital Signs Stable: post-procedure vital signs reviewed and stable  Report to RN Given: handoff report given  Patient transferred to: PACU    Handoff Report: Identifed the Patient, Identified the Reponsible Provider, Reviewed the pertinent medical history, Discussed the surgical course, Reviewed Intra-OP anesthesia mangement and issues during anesthesia, Set expectations for post-procedure period and Allowed opportunity for questions and acknowledgement of understanding      Vitals:  Vitals Value Taken Time   /72 09/23/23 0825   Temp 98.3  F (36.8  C) 09/23/23 0825   Pulse 73 09/23/23 0827   Resp 12 09/23/23 0827   SpO2 97 % 09/23/23 0827   Vitals shown include unvalidated device data.    Electronically Signed By: VANDANA Ross CRNA  September 23, 2023  8:28 AM

## 2023-09-23 NOTE — ANESTHESIA POSTPROCEDURE EVALUATION
Patient: Scarlet [facility]    Procedure: Procedure(s):  Cystoscopy, retrogrades, insert stent left ureter       Anesthesia Type:  General    Note:  Disposition: Inpatient   Postop Pain Control: Uneventful            Sign Out: Well controlled pain   PONV: No   Neuro/Psych: Uneventful            Sign Out: Acceptable/Baseline neuro status   Airway/Respiratory: Uneventful            Sign Out: Acceptable/Baseline resp. status   CV/Hemodynamics: Uneventful            Sign Out: Acceptable CV status   Other NRE: NONE   DID A NON-ROUTINE EVENT OCCUR? No           Last vitals:  Vitals Value Taken Time   /72 09/23/23 0845   Temp 98.3  F (36.8  C) 09/23/23 0825   Pulse 66 09/23/23 0851   Resp 18 09/23/23 0851   SpO2 100 % 09/23/23 0851   Vitals shown include unvalidated device data.    Electronically Signed By: Carson Reno MD  September 23, 2023  8:52 AM

## 2023-09-23 NOTE — ANESTHESIA PREPROCEDURE EVALUATION
Anesthesia Pre-Procedure Evaluation    Patient: Scarlet Chapa   MRN: [id number] : 1955        Procedure : Procedure(s):  Cystoscopy, retrogrades, insert stent left ureter          Past Medical History:   Diagnosis Date    Arthritis     Chronic kidney disease, stage III (moderate) (H)     Hyperlipidemia     Hypertension     Insomnia     Obese       Past Surgical History:   Procedure Laterality Date    ARTHROPLASTY HIP Left 2023    Procedure: LEFT POSTERIOR TOTAL HIP ARTHROPLASTY;  Surgeon: Nitin Carrero MD;  Location: [facility] OR    COLONOSCOPY N/A 2017    Procedure: COLONOSCOPY;  colonoscopy;  Surgeon: Haile Whitman MD;  Location:  GI    ORTHOPEDIC SURGERY Right 2015    Hip athroplasty      Allergies   Allergen Reactions    Gluten Meal Other (See Comments)     abd bloating, gas    Casein Other (See Comments)     Sneezing, watery eyes      Social History     Tobacco Use    Smoking status: Never    Smokeless tobacco: Never   Substance Use Topics    Alcohol use: Yes     Comment: rare      Wt Readings from Last 1 Encounters:   23 98.2 kg (216 lb 8 oz)        Anesthesia Evaluation   Pt has had prior anesthetic.     No history of anesthetic complications       ROS/MED HX  ENT/Pulmonary:  - neg pulmonary ROS     Neurologic:  - neg neurologic ROS     Cardiovascular:     (+) Dyslipidemia hypertension- -   -  - -                                      METS/Exercise Tolerance:     Hematologic:  - neg hematologic  ROS     Musculoskeletal:   (+)  arthritis,             GI/Hepatic:  - neg GI/hepatic ROS     Renal/Genitourinary:     (+) renal disease, type: CRI, Pt does not require dialysis,    Nephrolithiasis ,       Endo:     (+)               Obesity,       Psychiatric/Substance Use:  - neg psychiatric ROS     Infectious Disease:  - neg infectious disease ROS     Malignancy:       Other:            Physical Exam    Airway        Mallampati: II   TM distance: > 3 FB   Neck ROM: full    Mouth opening: > 3 cm    Respiratory Devices and Support         Dental       (+) Modest Abnormalities - crowns, retainers, 1 or 2 missing teeth      Cardiovascular          Rhythm and rate: regular and normal     Pulmonary           breath sounds clear to auscultation           OUTSIDE LABS:  CBC:   Lab Results   Component Value Date    WBC 5.9 09/23/2023    HGB 12.9 09/23/2023    HGB 12.0 08/05/2023    HCT 36.9 09/23/2023     09/23/2023     BMP:   Lab Results   Component Value Date     09/23/2023     (L) 09/23/2023    POTASSIUM 3.4 09/23/2023    POTASSIUM 4.0 09/23/2023    CHLORIDE 104 09/23/2023    CHLORIDE 100 09/23/2023    CO2 23 09/23/2023    CO2 19 (L) 09/23/2023    BUN 26.8 (H) 09/23/2023    BUN 30.7 (H) 09/23/2023    CR 1.09 (H) 09/23/2023    CR 1.16 (H) 09/23/2023    GLC 92 09/23/2023    GLC 91 09/23/2023     COAGS: No results found for: PTT, INR, FIBR  POC: No results found for: BGM, HCG, HCGS  HEPATIC: No results found for: ALBUMIN, PROTTOTAL, ALT, AST, GGT, ALKPHOS, BILITOTAL, BILIDIRECT, BRIANNA  OTHER:   Lab Results   Component Value Date    VANGIE 9.3 09/23/2023       Anesthesia Plan    ASA Status:  2    NPO Status:  NPO Appropriate    Anesthesia Type: General.     - Airway: LMA   Induction: Intravenous.   Maintenance: Balanced.        Consents    Anesthesia Plan(s) and associated risks, benefits, and realistic alternatives discussed. Questions answered and patient/representative(s) expressed understanding.     - Discussed:     - Discussed with:  Patient      - Extended Intubation/Ventilatory Support Discussed: No.      - Patient is DNR/DNI Status: No     Use of blood products discussed: No .     Postoperative Care    Pain management: IV analgesics.   PONV prophylaxis: Ondansetron (or other 5HT-3), Dexamethasone or Solumedrol     Comments:                Carson Reno MD

## 2023-09-23 NOTE — PROGRESS NOTES
ROOM # 213--1    Living Situation (if not independent, order SW consult): Home Alone   Facility name:n/a   : Haile Zhou     Activity level at baseline: Indpt.   Activity level on admit: Indpt.     Who will be transporting you at discharge: Brother- Haile or neighbor.     Patient registered to observation; given Patient Bill of Rights; given the opportunity to ask questions about observation status and their plan of care.  Patient has been oriented to the observation room, bathroom and call light is in place.    Discussed discharge goals and expectations with patient/family.         .

## 2023-09-23 NOTE — PLAN OF CARE
PRIMARY DIAGNOSIS: ACUTE RENAL COLIC    OUTPATIENT/OBSERVATION GOALS TO BE MET BEFORE DISCHARGE  1. Pain Status: Pain free.    2. Tolerating adequate PO diet:  NPO for possible procedure    3. Surgical Intervention planned: Yes    4. Cleared by consultants (if involved): No    5. Return to near baseline physical activity: Yes    Vitals are Temp: 98  F (36.7  C) Temp src: Oral BP: (!) 173/85 Pulse: 83   Resp: 20 SpO2: 97 %.    Patient is Alert and Oriented x4. Denies any pain with interview. Patient is Saline locked. NPO diet. She is independent with no assistive devices. Plan is for a formal Urology consult and to go OR at 0740 am for possible cysto with stent placement.     Discharge Planner Nurse   Safe discharge environment identified: Yes  Barriers to discharge: Yes       Entered by: Sandra Mejia RN 09/23/2023 4:15 AM     Please review provider order for any additional goals.   Nurse to notify provider when observation goals have been met and patient is ready for discharge.

## 2023-09-23 NOTE — DISCHARGE SUMMARY
[facility]    Discharge Summary  Hospitalist    Date of Admission:  9/22/2023  Date of Discharge:  9/23/2023  Provider:  Rajani Reaves PA-C  Date of Service (when I last saw the patient): 09/23/23    Discharge Diagnoses   Obstructing left ureteral stone   S/p cystoscopy with left ureteral stent placement   HTN  FRANCIA versus CKD    Other medical issues:  Past Medical History:   Diagnosis Date    Arthritis     Chronic kidney disease, stage III (moderate) (H)     Hyperlipidemia     Hypertension     Insomnia     Obese      History of Present Illness   Scarlet Chapa is a 68 year old female with PMH including hypertension who recently had been diagnosed with possible chronic kidney disease who presents with new diagnosis of left ureterolithiasis.  She was actually undergoing outpatient ultrasound in preparation for seeing nephrology and was found to have a left-sided kidney stone with hydronephrosis.  She was referred to the ER.     She tells me that she is felt somewhat unwell for the past 2 or 3 days, thinking maybe she had a cold because she was somewhat achy.  She did have some bilateral back discomfort.  Denies dysuria or measured fevers but she felt warm a couple of times at home.     Other than blood pressure she denies any significant medical history. Please see the admission history and physical for full details.    Hospital Course   Scarlet Chapa was admitted on 9/22/2023.  The following problems were addressed during her hospitalization:    #Obstructing left ureteral stone  #S/p cystoscopy with left ureteral stent placement: may have been in place for quite some time. Does not have obvious classic symptoms but is felt somewhat unwell for the past few days. No objective fevers but she has felt warm a couple of times. CT scan showed a 9 x 9 x 12 mm mid left ureteral obstructing stone.  -Urology consulted and took patient to OR on 9/23 for stent placement which she tolerated without issue  "  -afebrile and UA unremarkable, no indication for antibiotics   -pain tolerable without medication   -Flomax daily   -follow BMP in about 1 week then after definitive treatment of stone to monitor for improvement   -Urology will contact patient about definitive stone management      #History of hypertension: BP was elevated after procedure, seemed to be an element of anxiety contributing.   -discussed monitoring daily with BP cuff at home upon discharge  -stop hydrochlorothiazide  -continue PTA Losartan and Amlodipine for now     #FRANCIA vs CKD: prior to admission the patient was undergoing workup through nephrology for new CKD and found to have obstructing left ureteral stone which got her admitted. Unclear if this is true CKD or because of stone that has been found.  -avoid nephrotoxic agents  -consider holding off on nephrology evaluation until stone definitively treated and monitor if kidney function improves     Pending Results   Unresulted Labs Ordered in the Past 30 Days of this Admission       Date and Time Order Name Status Description    9/23/2023  8:14 AM Urine Culture In process     9/22/2023  8:57 PM Urine Culture Preliminary           Code Status   Full Code       Primary Care Physician   Daly Bedoya    Exam:    BP (!) 165/81 (BP Location: Right arm)   Pulse 76   Temp 98  F (36.7  C) (Axillary)   Resp 13   Ht 1.638 m (5' 4.5\")   Wt 98.2 kg (216 lb 8 oz)   SpO2 95%   BMI 36.59 kg/m    General: A&x3, pleasant, NAD, standing up next to bed  CV: RRR without murmur or rub  Lungs: CTAB without wheezing or rales  Abdomen: soft, normoactive bowel sounds, nontender, nondistended  Ext: no LE edema  Skin: warm, dry, no lesions in visualized areas     Discharge Disposition   Discharged to home    Consultations This Hospital Stay   UROLOGY IP CONSULT    Time Spent on this Encounter   I, Anu Reaves PA-C, personally saw the patient today and spent greater than 30 minutes discharging this " patient.    Discharge Orders      Reason for your hospital stay    You were admitted for concerns for recently worsening kidney function and renal ultrasound found you to have a kidney stone. You were treated with fluids and seen by our urologists. They opted to take you to surgery for stent placement and plans for definitive stone removal as an outpatient. Your urine did not look infected so antibiotics were not prescribed. We will follow your urine culture and call you if changes need to be made.     If you need to get a hold of [facility] for follow up please call [phone]    You did not receive your blood pressure medications while in the hospital and can take these at home upon discharge.     Follow-up and recommended labs and tests     Follow up with primary care provider, Daly Bedoya, within 7-14 days for hospital follow- up.  The following labs/tests are recommended: BMP in about 1 week and after treatment of kidney stone.  Follow up with [place] [facility] for definitive management of kidney stone, their office should call you to schedule this.     Activity    Your activity upon discharge: activity as tolerated     Monitor and record    blood pressure daily and bring log to follow up with primary care provider     Diet    Follow this diet upon discharge: Orders Placed This Encounter      Gluten Free Diet     Discharge Medications   Current Discharge Medication List        START taking these medications    Details   tamsulosin (FLOMAX) 0.4 MG capsule Take 1 capsule (0.4 mg) by mouth daily  Qty: 14 capsule, Refills: 0    Associated Diagnoses: Kidney stone           CONTINUE these medications which have NOT CHANGED    Details   amLODIPine (NORVASC) 5 MG tablet Take 1 tablet (5 mg) by mouth daily      losartan (COZAAR) 50 MG tablet Take 1 tablet (50 mg) by mouth daily      acetaminophen (TYLENOL) 500 MG tablet Take 1,000 mg by mouth every 6 hours as needed for mild pain      cholecalciferol 50 MCG (2000 UT)  CAPS Take 2,000 Units by mouth daily      coenzyme Q-10 100 MG TABS Take 100 mg by mouth daily      cyanocobalamin (VITAMIN B-12) 500 MCG tablet Take 500 mcg by mouth daily      diclofenac (VOLTAREN) 1 % topical gel Apply 4 g topically 4 times daily as needed for moderate pain      fish oil-omega-3 fatty acids 1000 MG capsule Take 1 g by mouth daily      magnesium 100 MG TABS Take 1 tablet by mouth daily           STOP taking these medications       ascorbic acid 1000 MG TABS tablet Comments:   Reason for Stopping:         aspirin 81 MG EC tablet Comments:   Reason for Stopping:         cefadroxil (DURICEF) 500 MG capsule Comments:   Reason for Stopping:         hydrOXYzine (ATARAX) 10 MG tablet Comments:   Reason for Stopping:         lactobacillus rhamnosus, GG, (CULTURELL) capsule Comments:   Reason for Stopping:         losartan-hydrochlorothiazide (HYZAAR) 50-12.5 MG tablet Comments:   Reason for Stopping:         methocarbamol (ROBAXIN) 500 MG tablet Comments:   Reason for Stopping:         oxyCODONE (ROXICODONE) 5 MG tablet Comments:   Reason for Stopping:         senna-docusate (SENOKOT-S/PERICOLACE) 8.6-50 MG tablet Comments:   Reason for Stopping:             Allergies   Allergies   Allergen Reactions    Gluten Meal Other (See Comments)     abd bloating, gas    Casein Other (See Comments)     Sneezing, watery eyes     Data   Results for orders placed or performed during the hospital encounter of 09/22/23   Abd/pelvis CT no contrast - Stone Protocol     Status: None    Narrative    EXAM: CT ABDOMEN PELVIS W/O CONTRAST  LOCATION: [facility]  DATE: 9/22/2023    INDICATION: concern for left sided kidney stone  COMPARISON: None.  TECHNIQUE: CT scan of the abdomen and pelvis was performed without IV contrast. Multiplanar reformats were obtained. Dose reduction techniques were used.  CONTRAST: None.    FINDINGS:   LOWER CHEST: 3 x 2 mm nodule in the posterior medial right lung base. Based  on appearance, size and location no follow-up needed. Small hiatal hernia.    HEPATOBILIARY: No significant mass or bile duct dilatation. No calcified gallstones.     PANCREAS: No significant mass, duct dilatation, or inflammatory change.    SPLEEN: Normal size.    ADRENAL GLANDS: Normal.    KIDNEYS/BLADDER: 9.1 x 9.1 x 12.6 mm stone in the left mid ureter causing marked hydronephrosis and hydroureter to additional stones in the lower pole collecting system left kidney. No right renal calculi noted. No additional stones are seen along the   courses of the ureters.    BOWEL: No obstruction or inflammatory change. Appendix within normal limits    LYMPH NODES: No lymphadenopathy.    VASCULATURE: No abdominal aortic aneurysm.    PELVIC ORGANS: 5.7 x 3.8 cm and 4.5 x 4.2 cm calcified fibroids in the uterus. No pelvic free fluid.    MUSCULOSKELETAL: Bilateral hip arthroplasties with associated streak artifact obscuring details of the mid and lower abdomen. Mild to moderate lumbar spondylosis      Impression    IMPRESSION:   1.  9.1 x 9.1 x 12.6 mm stone in the left mid ureter causing marked hydronephrosis and hydroureter to additional stones in the lower pole collecting system left kidney. No right renal calculi noted. No additional stones are seen along the courses of the   ureters.    2.  Bilateral hip arthroplasties with associated streak artifact obscuring details of the mid and lower abdomen.    3.  5.7 x 3.8 cm and 4.5 x 4.2 cm calcified fibroids in the uterus. No pelvic free fluid.    4.  3 x 2 mm nodule in the posterior medial right lung base. Based on appearance, size and location no follow-up needed.     5.  Small hiatal hernia.     XR Surgery ROSAS L/T 5 Min Fluoro w Stills     Status: None    Narrative    This exam was marked as non-reportable because it will not be read by a   radiologist or a [place] non-radiologist provider.         Basic metabolic panel (BMP)     Status: Abnormal   Result Value Ref Range     Sodium 134 (L) 136 - 145 mmol/L    Potassium 4.0 3.4 - 5.3 mmol/L    Chloride 100 98 - 107 mmol/L    Carbon Dioxide (CO2) 19 (L) 22 - 29 mmol/L    Anion Gap 15 7 - 15 mmol/L    Urea Nitrogen 30.7 (H) 8.0 - 23.0 mg/dL    Creatinine 1.16 (H) 0.51 - 0.95 mg/dL    Calcium 9.7 8.8 - 10.2 mg/dL    Glucose 91 70 - 99 mg/dL    GFR Estimate 52 (L) >60 mL/min/1.73m2   UA with Microscopic reflex to Culture     Status: Abnormal    Specimen: Urine, Midstream   Result Value Ref Range    Color Urine Light Yellow Colorless, Straw, Light Yellow, Yellow    Appearance Urine Clear Clear    Glucose Urine Negative Negative mg/dL    Bilirubin Urine Negative Negative    Ketones Urine Negative Negative mg/dL    Specific Gravity Urine 1.011 1.003 - 1.035    Blood Urine Negative Negative    pH Urine 5.0 5.0 - 7.0    Protein Albumin Urine Negative Negative mg/dL    Urobilinogen Urine Normal Normal, 2.0 mg/dL    Nitrite Urine Negative Negative    Leukocyte Esterase Urine Large (A) Negative    RBC Urine 1 <=2 /HPF    WBC Urine 13 (H) <=5 /HPF    Squamous Epithelials Urine <1 <=1 /HPF    Narrative    Urine Culture ordered based on laboratory criteria   CBC with platelets and differential     Status: None   Result Value Ref Range    WBC Count 5.9 4.0 - 11.0 10e3/uL    RBC Count 4.27 3.80 - 5.20 10e6/uL    Hemoglobin 12.9 11.7 - 15.7 g/dL    Hematocrit 36.9 35.0 - 47.0 %    MCV 86 78 - 100 fL    MCH 30.2 26.5 - 33.0 pg    MCHC 35.0 31.5 - 36.5 g/dL    RDW 13.2 10.0 - 15.0 %    Platelet Count 208 150 - 450 10e3/uL    % Neutrophils 60 %    % Lymphocytes 24 %    % Monocytes 11 %    % Eosinophils 4 %    % Basophils 1 %    % Immature Granulocytes 0 %    NRBCs per 100 WBC 0 <1 /100    Absolute Neutrophils 3.6 1.6 - 8.3 10e3/uL    Absolute Lymphocytes 1.4 0.8 - 5.3 10e3/uL    Absolute Monocytes 0.6 0.0 - 1.3 10e3/uL    Absolute Eosinophils 0.2 0.0 - 0.7 10e3/uL    Absolute Basophils 0.0 0.0 - 0.2 10e3/uL    Absolute Immature Granulocytes 0.0 <=0.4  10e3/uL    Absolute NRBCs 0.0 10e3/uL   Basic metabolic panel     Status: Abnormal   Result Value Ref Range    Sodium 137 136 - 145 mmol/L    Potassium 3.4 3.4 - 5.3 mmol/L    Chloride 104 98 - 107 mmol/L    Carbon Dioxide (CO2) 23 22 - 29 mmol/L    Anion Gap 10 7 - 15 mmol/L    Urea Nitrogen 26.8 (H) 8.0 - 23.0 mg/dL    Creatinine 1.09 (H) 0.51 - 0.95 mg/dL    Calcium 9.3 8.8 - 10.2 mg/dL    Glucose 92 70 - 99 mg/dL    GFR Estimate 56 (L) >60 mL/min/1.73m2   Urine Culture     Status: None (Preliminary result)    Specimen: Urine, Midstream   Result Value Ref Range    Culture No growth, less than 1 day    CBC + differential     Status: None    Narrative    The following orders were created for panel order CBC + differential.  Procedure                               Abnormality         Status                     ---------                               -----------         ------                     CBC with platelets and d...[[id number]]                      Final result                 Please view results for these tests on the individual orders.     Anu Reaves PA-C

## 2023-09-23 NOTE — PROGRESS NOTES
SW met with pt regarding moon letter. Yellow copy left with pt, and original copy left in pt's paper chart.    Angelica MCGRAW, [facility]  Inpatient Care Coordination   [facility]   [phone]

## 2023-09-24 LAB
BACTERIA UR CULT: NO GROWTH
BACTERIA UR CULT: NORMAL

## 2023-09-25 ENCOUNTER — PATIENT OUTREACH (OUTPATIENT)
Dept: CARE COORDINATION | Facility: CLINIC | Age: 68
End: 2023-09-25
Payer: COMMERCIAL

## 2023-09-25 ENCOUNTER — TRANSFERRED RECORDS (OUTPATIENT)
Dept: MULTI SPECIALTY CLINIC | Facility: CLINIC | Age: 68
End: 2023-09-25
Payer: COMMERCIAL

## 2023-09-25 LAB
CREATININE (EXTERNAL): 1.12 MG/DL (ref 0.5–0.9)
GFR ESTIMATED (EXTERNAL): 54 ML/MIN/1.73M2
GLUCOSE (EXTERNAL): 88 MG/DL (ref 70–99)
POTASSIUM (EXTERNAL): 4.1 MMOL/L (ref 3.5–5.1)

## 2023-09-25 NOTE — PROGRESS NOTES
[facility]    Background: Transitional Care Management program identified per system criteria and reviewed by [facility] team for possible outreach.    Assessment: Upon chart review, [facility] Team member will not proceed with patient outreach related to this episode of Transitional Care Management program due to reason below:    Patient has a follow up appointment with an appropriate provider today for hospital discharge    Plan: Transitional Care Management episode addressed appropriately per reason noted above.      Claudia Martines  Community Health Worker  [facility] [phone]      *[facility] Care Resource Team does NOT follow patient ongoing. Referrals are identified based on internal discharge reports and the outreach is to ensure patient has an understanding of their discharge instructions.

## 2023-09-26 ENCOUNTER — VIRTUAL VISIT (OUTPATIENT)
Dept: UROLOGY | Facility: CLINIC | Age: 68
End: 2023-09-26
Payer: COMMERCIAL

## 2023-09-26 VITALS — BODY MASS INDEX: 37.35 KG/M2 | WEIGHT: 221 LBS

## 2023-09-26 DIAGNOSIS — N20.1 URETERAL STONE: Primary | ICD-10-CM

## 2023-09-26 DIAGNOSIS — E66.812 CLASS 2 SEVERE OBESITY DUE TO EXCESS CALORIES WITH SERIOUS COMORBIDITY AND BODY MASS INDEX (BMI) OF 35.0 TO 35.9 IN ADULT (H): ICD-10-CM

## 2023-09-26 DIAGNOSIS — E66.01 CLASS 2 SEVERE OBESITY DUE TO EXCESS CALORIES WITH SERIOUS COMORBIDITY AND BODY MASS INDEX (BMI) OF 35.0 TO 35.9 IN ADULT (H): ICD-10-CM

## 2023-09-26 PROCEDURE — 99214 OFFICE O/P EST MOD 30 MIN: CPT | Mod: VID | Performed by: UROLOGY

## 2023-09-26 NOTE — NURSING NOTE
Is the patient currently in the state of [place]? YES    Visit mode:VIDEO    If the visit is dropped, the patient can be reconnected by: VIDEO VISIT: Text to cell phone:   Telephone Information:   Mobile [phone]       Will anyone else be joining the visit? NO  (If patient encounters technical issues they should call [phone]    How would you like to obtain your AVS? MyChart    Are changes needed to the allergy or medication list? No, Pt stated no changes to allergies, and Pt stated no med changes    Reason for visit: CANDICE STACK

## 2023-09-26 NOTE — PROGRESS NOTES
Office Visit Note  [facility]  [phone]    Sep 26, 2023    [unfilled]    1955    UROLOGIC DIAGNOSES:    Left ureteral stone and kidney stones    CURRENT INTERVENTIONS:    Stent    History:    This is a 68-year-old woman who had a renal ultrasound performed because of hypertension and elevated creatinine.  The ultrasound showed hydronephrosis on the left side along with stones.  She had a follow-up CT scan performed that showed a large obstructing left ureteral stone along with other stones in the left kidney.  She was completely asymptomatic.  She has no prior stone history.  She was sent to the hospital and had a left-sided stent placed by Dr. Dangelo over the weekend.  She reports feeling well since stent placement.  She has had some urinary frequency but otherwise no pain or other symptoms.      Imaging: I personally reviewed her CT scan images.  Large mid left ureteral stone causing obstruction.  2 other stones in the lower pole of the left kidney as well.    Labs:      MEDICATIONS:    Current Outpatient Medications:     acetaminophen (TYLENOL) 500 MG tablet, Take 1,000 mg by mouth every 6 hours as needed for mild pain, Disp: , Rfl:     amLODIPine (NORVASC) 5 MG tablet, Take 1 tablet (5 mg) by mouth daily, Disp: , Rfl:     cholecalciferol 50 MCG (2000 UT) CAPS, Take 2,000 Units by mouth daily, Disp: , Rfl:     coenzyme Q-10 100 MG TABS, Take 100 mg by mouth daily, Disp: , Rfl:     cyanocobalamin (VITAMIN B-12) 500 MCG tablet, Take 500 mcg by mouth daily, Disp: , Rfl:     diclofenac (VOLTAREN) 1 % topical gel, Apply 4 g topically 4 times daily as needed for moderate pain, Disp: , Rfl:     fish oil-omega-3 fatty acids 1000 MG capsule, Take 1 g by mouth daily, Disp: , Rfl:     losartan (COZAAR) 50 MG tablet, Take 1 tablet (50 mg) by mouth daily, Disp: , Rfl:     magnesium 100 MG TABS, Take 1 tablet by mouth daily, Disp: , Rfl:     tamsulosin (FLOMAX) 0.4 MG capsule, Take 1 capsule (0.4 mg) by  mouth daily, Disp: 14 capsule, Rfl: 0    ALLERGIES:     Allergies   Allergen Reactions    Gluten Meal Other (See Comments)     abd bloating, gas    Casein Other (See Comments)     Sneezing, watery eyes       REVIEW OF SYSTEMS:   Skin: No rash, pruritis, or skin pigmentation  Eyes: No changes in vision  Ears/Nose/Throat: No changes in hearing, no nosebleeds  Respiratory: No shortness of breath, dyspnea on exertion, cough, or hemoptysis  Cardiovascular: No chest pain or palpitations  Gastrointestinal: No diarrhea or constipation. No abdominal pain. No hematochezia  Genitourinary: see HPI  Musculoskeletal: No pain or swelling of joints, normal range of motion  Neurologic: No weakness or tremors  Psychiatric: No recent changes in memory or mood  Hematologic/Lymphatic/Immunologic: No easy bruising or enlarged lymph nodes  Endocrine: No weight gain or loss    SURGICAL HISTORY:    Past Surgical History:   Procedure Laterality Date    ARTHROPLASTY HIP Left 8/4/2023    Procedure: LEFT POSTERIOR TOTAL HIP ARTHROPLASTY;  Surgeon: Nitin Carrero MD;  Location: [facility] Main OR    COLONOSCOPY N/A 11/1/2017    Procedure: COLONOSCOPY;  colonoscopy;  Surgeon: Haile Whitman MD;  Location:  GI    CYSTOSCOPY, RETROGRADES, INSERT STENT URETER(S), COMBINED Left 9/23/2023    Procedure: Cystoscopy with left retrograde pyelogram, left ureteral stent placement, intraoperative interpretation of fluoroscopic imaging;  Surgeon: Shreya Dangelo MD;  Location:  OR    ORTHOPEDIC SURGERY Right 12/2015    Hip athroplasty         PHYSICAL EXAM:    General: Alert and oriented to time, place, and self. In NAD   HEENT: Head AT/NC, EOMI, CN Grossly intact   Lungs: no respiratory distress, or pursed lip breathing   Heart: No obvious jugular venous distension present   Musculoskeltal: Normal movements. Normal appearing musculature  Skin: no suspicious lesions or rashes   Neuro: Alert, oriented, speech and mentation normal; moving all 4  extremities equally.   Psych: affect and mood normal        Urinalysis:  UA RESULTS:  Recent Labs   Lab Test 09/22/23 2033   COLOR Light Yellow   APPEARANCE Clear   URINEGLC Negative   URINEBILI Negative   URINEKETONE Negative   SG 1.011   UBLD Negative   URINEPH 5.0   PROTEIN Negative   NITRITE Negative   LEUKEST Large*   RBCU 1   WBCU 13*         IMPRESSION:    Left ureteral and kidney stones    PLAN:    She had a large left ureteral stone causing obstruction as well as other stones in the left kidney.  She has a stent in place and is doing well.  We discussed the follow-up procedure that will be required to remove her stone burden.  We discussed cystoscopy with left ureteral stent exchange, left ureteroscopy with laser lithotripsy and stone basketing.  We discussed the procedure along with its risks and expected recovery.  All of her questions were answered and she wishes to proceed.    We will get her scheduled as an outpatient in the near future.  I recommend that the stent likely be left in place for another 2 weeks after her procedure given the size and likely high degree of obstruction from her ureteral stone.      Aiden Hunter M.D.         Virtual Visit Details    Type of service:  Video Visit   Video Start Time: 11:39 AM  Video End Time:11:54 AM    Originating Location (pt. Location): Home    Distant Location (provider location):  On-site  Platform used for Video Visit: [employer]khar

## 2023-09-26 NOTE — LETTER
9/26/2023       RE: Scarlet Chapa  [address] [facility] [place] [address]     Dear Colleague,    Thank you for referring your patient, Scarlet Chapa, to the [facility] at [facility]. Please see a copy of my visit note below.    Office Visit Note  [facility]  [phone]    Sep 26, 2023    [unfilled]    1955    UROLOGIC DIAGNOSES:    Left ureteral stone and kidney stones    CURRENT INTERVENTIONS:    Stent    History:    This is a 68-year-old woman who had a renal ultrasound performed because of hypertension and elevated creatinine.  The ultrasound showed hydronephrosis on the left side along with stones.  She had a follow-up CT scan performed that showed a large obstructing left ureteral stone along with other stones in the left kidney.  She was completely asymptomatic.  She has no prior stone history.  She was sent to the hospital and had a left-sided stent placed by Dr. Dangelo over the weekend.  She reports feeling well since stent placement.  She has had some urinary frequency but otherwise no pain or other symptoms.      Imaging: I personally reviewed her CT scan images.  Large mid left ureteral stone causing obstruction.  2 other stones in the lower pole of the left kidney as well.    Labs:      MEDICATIONS:    Current Outpatient Medications:     acetaminophen (TYLENOL) 500 MG tablet, Take 1,000 mg by mouth every 6 hours as needed for mild pain, Disp: , Rfl:     amLODIPine (NORVASC) 5 MG tablet, Take 1 tablet (5 mg) by mouth daily, Disp: , Rfl:     cholecalciferol 50 MCG (2000 UT) CAPS, Take 2,000 Units by mouth daily, Disp: , Rfl:     coenzyme Q-10 100 MG TABS, Take 100 mg by mouth daily, Disp: , Rfl:     cyanocobalamin (VITAMIN B-12) 500 MCG tablet, Take 500 mcg by mouth daily, Disp: , Rfl:     diclofenac (VOLTAREN) 1 % topical gel, Apply 4 g topically 4 times daily as needed for moderate pain, Disp: , Rfl:      fish oil-omega-3 fatty acids 1000 MG capsule, Take 1 g by mouth daily, Disp: , Rfl:     losartan (COZAAR) 50 MG tablet, Take 1 tablet (50 mg) by mouth daily, Disp: , Rfl:     magnesium 100 MG TABS, Take 1 tablet by mouth daily, Disp: , Rfl:     tamsulosin (FLOMAX) 0.4 MG capsule, Take 1 capsule (0.4 mg) by mouth daily, Disp: 14 capsule, Rfl: 0    ALLERGIES:     Allergies   Allergen Reactions    Gluten Meal Other (See Comments)     abd bloating, gas    Casein Other (See Comments)     Sneezing, watery eyes       REVIEW OF SYSTEMS:   Skin: No rash, pruritis, or skin pigmentation  Eyes: No changes in vision  Ears/Nose/Throat: No changes in hearing, no nosebleeds  Respiratory: No shortness of breath, dyspnea on exertion, cough, or hemoptysis  Cardiovascular: No chest pain or palpitations  Gastrointestinal: No diarrhea or constipation. No abdominal pain. No hematochezia  Genitourinary: see HPI  Musculoskeletal: No pain or swelling of joints, normal range of motion  Neurologic: No weakness or tremors  Psychiatric: No recent changes in memory or mood  Hematologic/Lymphatic/Immunologic: No easy bruising or enlarged lymph nodes  Endocrine: No weight gain or loss    SURGICAL HISTORY:    Past Surgical History:   Procedure Laterality Date    ARTHROPLASTY HIP Left 8/4/2023    Procedure: LEFT POSTERIOR TOTAL HIP ARTHROPLASTY;  Surgeon: Nitin Carrero MD;  Location: [facility] OR    COLONOSCOPY N/A 11/1/2017    Procedure: COLONOSCOPY;  colonoscopy;  Surgeon: Haile Whitman MD;  Location:  GI    CYSTOSCOPY, RETROGRADES, INSERT STENT URETER(S), COMBINED Left 9/23/2023    Procedure: Cystoscopy with left retrograde pyelogram, left ureteral stent placement, intraoperative interpretation of fluoroscopic imaging;  Surgeon: Shreya Dangelo MD;  Location:  OR    ORTHOPEDIC SURGERY Right 12/2015    Hip athroplasty         PHYSICAL EXAM:    General: Alert and oriented to time, place, and self. In NAD   HEENT: Head  AT/NC, EOMI, CN Grossly intact   Lungs: no respiratory distress, or pursed lip breathing   Heart: No obvious jugular venous distension present   Musculoskeltal: Normal movements. Normal appearing musculature  Skin: no suspicious lesions or rashes   Neuro: Alert, oriented, speech and mentation normal; moving all 4 extremities equally.   Psych: affect and mood normal        Urinalysis:  UA RESULTS:  Recent Labs   Lab Test 09/22/23 2033   COLOR Light Yellow   APPEARANCE Clear   URINEGLC Negative   URINEBILI Negative   URINEKETONE Negative   SG 1.011   UBLD Negative   URINEPH 5.0   PROTEIN Negative   NITRITE Negative   LEUKEST Large*   RBCU 1   WBCU 13*         IMPRESSION:    Left ureteral and kidney stones    PLAN:    She had a large left ureteral stone causing obstruction as well as other stones in the left kidney.  She has a stent in place and is doing well.  We discussed the follow-up procedure that will be required to remove her stone burden.  We discussed cystoscopy with left ureteral stent exchange, left ureteroscopy with laser lithotripsy and stone basketing.  We discussed the procedure along with its risks and expected recovery.  All of her questions were answered and she wishes to proceed.    We will get her scheduled as an outpatient in the near future.  I recommend that the stent likely be left in place for another 2 weeks after her procedure given the size and likely high degree of obstruction from her ureteral stone.      Aiden Hunter M.D.         Virtual Visit Details    Type of service:  Video Visit   Video Start Time: 11:39 AM  Video End Time:11:54 AM    Originating Location (pt. Location): Home    Distant Location (provider location):  On-site  Platform used for Video Visit: Shekhar

## 2023-10-02 RX ORDER — AMOXICILLIN 500 MG/1
TABLET, FILM COATED ORAL
COMMUNITY
Start: 2023-09-19

## 2023-10-06 ENCOUNTER — HOSPITAL ENCOUNTER (OUTPATIENT)
Facility: CLINIC | Age: 68
Discharge: HOME | End: 2023-10-06
Attending: UROLOGY | Admitting: UROLOGY
Payer: COMMERCIAL

## 2023-10-06 ENCOUNTER — APPOINTMENT (OUTPATIENT)
Dept: GENERAL RADIOLOGY | Facility: CLINIC | Age: 68
End: 2023-10-06
Attending: UROLOGY
Payer: COMMERCIAL

## 2023-10-06 ENCOUNTER — ANESTHESIA (OUTPATIENT)
Dept: SURGERY | Facility: CLINIC | Age: 68
End: 2023-10-06
Payer: COMMERCIAL

## 2023-10-06 ENCOUNTER — ANESTHESIA EVENT (OUTPATIENT)
Dept: SURGERY | Facility: CLINIC | Age: 68
End: 2023-10-06
Payer: COMMERCIAL

## 2023-10-06 VITALS
DIASTOLIC BLOOD PRESSURE: 95 MMHG | HEIGHT: 64 IN | RESPIRATION RATE: 15 BRPM | OXYGEN SATURATION: 100 % | TEMPERATURE: 96.7 F | SYSTOLIC BLOOD PRESSURE: 178 MMHG | HEART RATE: 76 BPM | BODY MASS INDEX: 36.38 KG/M2 | WEIGHT: 213.1 LBS

## 2023-10-06 PROCEDURE — 250N000009 HC RX 250: Performed by: NURSE ANESTHETIST, CERTIFIED REGISTERED

## 2023-10-06 PROCEDURE — 74420 UROGRAPHY RTRGR +-KUB: CPT | Mod: 26 | Performed by: UROLOGY

## 2023-10-06 PROCEDURE — 255N000002 HC RX 255 OP 636: Mod: JZ | Performed by: UROLOGY

## 2023-10-06 PROCEDURE — C1769 GUIDE WIRE: HCPCS | Performed by: UROLOGY

## 2023-10-06 PROCEDURE — 250N000009 HC RX 250: Performed by: UROLOGY

## 2023-10-06 PROCEDURE — 360N000083 HC SURGERY LEVEL 3 W/ FLUORO, PER MIN: Performed by: UROLOGY

## 2023-10-06 PROCEDURE — 52356 CYSTO/URETERO W/LITHOTRIPSY: CPT | Mod: LT | Performed by: UROLOGY

## 2023-10-06 PROCEDURE — 250N000011 HC RX IP 250 OP 636: Performed by: UROLOGY

## 2023-10-06 PROCEDURE — 250N000011 HC RX IP 250 OP 636: Mod: JZ | Performed by: NURSE ANESTHETIST, CERTIFIED REGISTERED

## 2023-10-06 PROCEDURE — 999N000179 XR SURGERY CARM FLUORO LESS THAN 5 MIN W STILLS: Mod: TC

## 2023-10-06 PROCEDURE — 258N000001 HC RX 258: Performed by: UROLOGY

## 2023-10-06 PROCEDURE — 250N000011 HC RX IP 250 OP 636: Performed by: NURSE ANESTHETIST, CERTIFIED REGISTERED

## 2023-10-06 PROCEDURE — 250N000025 HC SEVOFLURANE, PER MIN: Performed by: UROLOGY

## 2023-10-06 PROCEDURE — 272N000001 HC OR GENERAL SUPPLY STERILE: Performed by: UROLOGY

## 2023-10-06 PROCEDURE — 258N000003 HC RX IP 258 OP 636: Performed by: ANESTHESIOLOGY

## 2023-10-06 PROCEDURE — 710N000012 HC RECOVERY PHASE 2, PER MINUTE: Performed by: UROLOGY

## 2023-10-06 PROCEDURE — 710N000009 HC RECOVERY PHASE 1, LEVEL 1, PER MIN: Performed by: UROLOGY

## 2023-10-06 PROCEDURE — 258N000003 HC RX IP 258 OP 636: Performed by: NURSE ANESTHETIST, CERTIFIED REGISTERED

## 2023-10-06 PROCEDURE — 370N000017 HC ANESTHESIA TECHNICAL FEE, PER MIN: Performed by: UROLOGY

## 2023-10-06 PROCEDURE — 999N000141 HC STATISTIC PRE-PROCEDURE NURSING ASSESSMENT: Performed by: UROLOGY

## 2023-10-06 PROCEDURE — 82365 CALCULUS SPECTROSCOPY: CPT | Performed by: UROLOGY

## 2023-10-06 PROCEDURE — C2617 STENT, NON-COR, TEM W/O DEL: HCPCS | Performed by: UROLOGY

## 2023-10-06 DEVICE — STENT URETERAL POLARIS ULTRA 5FRX26CM M0061921230: Type: IMPLANTABLE DEVICE | Site: URETER | Status: FUNCTIONAL

## 2023-10-06 RX ORDER — METOPROLOL TARTRATE 1 MG/ML
1-2 INJECTION, SOLUTION INTRAVENOUS EVERY 5 MIN PRN
Status: DISCONTINUED | OUTPATIENT
Start: 2023-10-06 | End: 2023-10-06 | Stop reason: HOSPADM

## 2023-10-06 RX ORDER — PROPOFOL 10 MG/ML
INJECTION, EMULSION INTRAVENOUS PRN
Status: DISCONTINUED | OUTPATIENT
Start: 2023-10-06 | End: 2023-10-06

## 2023-10-06 RX ORDER — SODIUM CHLORIDE, SODIUM LACTATE, POTASSIUM CHLORIDE, CALCIUM CHLORIDE 600; 310; 30; 20 MG/100ML; MG/100ML; MG/100ML; MG/100ML
INJECTION, SOLUTION INTRAVENOUS CONTINUOUS
Status: DISCONTINUED | OUTPATIENT
Start: 2023-10-06 | End: 2023-10-06 | Stop reason: HOSPADM

## 2023-10-06 RX ORDER — ONDANSETRON 2 MG/ML
4 INJECTION INTRAMUSCULAR; INTRAVENOUS EVERY 30 MIN PRN
Status: DISCONTINUED | OUTPATIENT
Start: 2023-10-06 | End: 2023-10-06 | Stop reason: HOSPADM

## 2023-10-06 RX ORDER — FENTANYL CITRATE 50 UG/ML
INJECTION, SOLUTION INTRAMUSCULAR; INTRAVENOUS PRN
Status: DISCONTINUED | OUTPATIENT
Start: 2023-10-06 | End: 2023-10-06

## 2023-10-06 RX ORDER — FENTANYL CITRATE 50 UG/ML
25 INJECTION, SOLUTION INTRAMUSCULAR; INTRAVENOUS EVERY 5 MIN PRN
Status: DISCONTINUED | OUTPATIENT
Start: 2023-10-06 | End: 2023-10-06 | Stop reason: HOSPADM

## 2023-10-06 RX ORDER — LIDOCAINE HYDROCHLORIDE 20 MG/ML
INJECTION, SOLUTION INFILTRATION; PERINEURAL PRN
Status: DISCONTINUED | OUTPATIENT
Start: 2023-10-06 | End: 2023-10-06

## 2023-10-06 RX ORDER — ONDANSETRON 4 MG/1
4 TABLET, ORALLY DISINTEGRATING ORAL EVERY 30 MIN PRN
Status: DISCONTINUED | OUTPATIENT
Start: 2023-10-06 | End: 2023-10-06 | Stop reason: HOSPADM

## 2023-10-06 RX ORDER — DEXAMETHASONE SODIUM PHOSPHATE 4 MG/ML
INJECTION, SOLUTION INTRA-ARTICULAR; INTRALESIONAL; INTRAMUSCULAR; INTRAVENOUS; SOFT TISSUE PRN
Status: DISCONTINUED | OUTPATIENT
Start: 2023-10-06 | End: 2023-10-06

## 2023-10-06 RX ORDER — GLYCOPYRROLATE 0.2 MG/ML
INJECTION, SOLUTION INTRAMUSCULAR; INTRAVENOUS PRN
Status: DISCONTINUED | OUTPATIENT
Start: 2023-10-06 | End: 2023-10-06

## 2023-10-06 RX ORDER — LIDOCAINE 40 MG/G
CREAM TOPICAL
Status: DISCONTINUED | OUTPATIENT
Start: 2023-10-06 | End: 2023-10-06 | Stop reason: HOSPADM

## 2023-10-06 RX ORDER — ACETAMINOPHEN 325 MG/1
975 TABLET ORAL ONCE
Status: COMPLETED | OUTPATIENT
Start: 2023-10-06 | End: 2023-10-06

## 2023-10-06 RX ORDER — FENTANYL CITRATE 50 UG/ML
50 INJECTION, SOLUTION INTRAMUSCULAR; INTRAVENOUS EVERY 5 MIN PRN
Status: DISCONTINUED | OUTPATIENT
Start: 2023-10-06 | End: 2023-10-06 | Stop reason: HOSPADM

## 2023-10-06 RX ORDER — KETOROLAC TROMETHAMINE 15 MG/ML
15 INJECTION, SOLUTION INTRAMUSCULAR; INTRAVENOUS
Status: DISCONTINUED | OUTPATIENT
Start: 2023-10-06 | End: 2023-10-06 | Stop reason: HOSPADM

## 2023-10-06 RX ORDER — CEFAZOLIN SODIUM/WATER 2 G/20 ML
2 SYRINGE (ML) INTRAVENOUS
Status: COMPLETED | OUTPATIENT
Start: 2023-10-06 | End: 2023-10-06

## 2023-10-06 RX ORDER — HYDRALAZINE HYDROCHLORIDE 20 MG/ML
2.5-5 INJECTION INTRAMUSCULAR; INTRAVENOUS EVERY 10 MIN PRN
Status: DISCONTINUED | OUTPATIENT
Start: 2023-10-06 | End: 2023-10-06 | Stop reason: HOSPADM

## 2023-10-06 RX ORDER — ONDANSETRON 2 MG/ML
INJECTION INTRAMUSCULAR; INTRAVENOUS PRN
Status: DISCONTINUED | OUTPATIENT
Start: 2023-10-06 | End: 2023-10-06

## 2023-10-06 RX ORDER — HYDROMORPHONE HCL IN WATER/PF 6 MG/30 ML
0.4 PATIENT CONTROLLED ANALGESIA SYRINGE INTRAVENOUS EVERY 5 MIN PRN
Status: DISCONTINUED | OUTPATIENT
Start: 2023-10-06 | End: 2023-10-06 | Stop reason: HOSPADM

## 2023-10-06 RX ORDER — HYDROMORPHONE HCL IN WATER/PF 6 MG/30 ML
0.2 PATIENT CONTROLLED ANALGESIA SYRINGE INTRAVENOUS EVERY 5 MIN PRN
Status: DISCONTINUED | OUTPATIENT
Start: 2023-10-06 | End: 2023-10-06 | Stop reason: HOSPADM

## 2023-10-06 RX ORDER — CEFAZOLIN SODIUM/WATER 2 G/20 ML
2 SYRINGE (ML) INTRAVENOUS SEE ADMIN INSTRUCTIONS
Status: DISCONTINUED | OUTPATIENT
Start: 2023-10-06 | End: 2023-10-06 | Stop reason: HOSPADM

## 2023-10-06 RX ORDER — DEXAMETHASONE SODIUM PHOSPHATE 4 MG/ML
4 INJECTION, SOLUTION INTRA-ARTICULAR; INTRALESIONAL; INTRAMUSCULAR; INTRAVENOUS; SOFT TISSUE
Status: DISCONTINUED | OUTPATIENT
Start: 2023-10-06 | End: 2023-10-06 | Stop reason: HOSPADM

## 2023-10-06 RX ADMIN — PHENYLEPHRINE HYDROCHLORIDE 100 MCG: 10 INJECTION INTRAVENOUS at 16:10

## 2023-10-06 RX ADMIN — DEXAMETHASONE SODIUM PHOSPHATE 4 MG: 4 INJECTION, SOLUTION INTRA-ARTICULAR; INTRALESIONAL; INTRAMUSCULAR; INTRAVENOUS; SOFT TISSUE at 15:45

## 2023-10-06 RX ADMIN — PHENYLEPHRINE HYDROCHLORIDE 50 MCG: 10 INJECTION INTRAVENOUS at 16:06

## 2023-10-06 RX ADMIN — Medication 2 G: at 15:34

## 2023-10-06 RX ADMIN — MIDAZOLAM 2 MG: 1 INJECTION INTRAMUSCULAR; INTRAVENOUS at 15:34

## 2023-10-06 RX ADMIN — PROPOFOL 150 MG: 10 INJECTION, EMULSION INTRAVENOUS at 15:45

## 2023-10-06 RX ADMIN — ONDANSETRON 4 MG: 2 INJECTION INTRAMUSCULAR; INTRAVENOUS at 15:44

## 2023-10-06 RX ADMIN — LIDOCAINE HYDROCHLORIDE 50 MG: 20 INJECTION, SOLUTION INFILTRATION; PERINEURAL at 15:45

## 2023-10-06 RX ADMIN — GLYCOPYRROLATE 0.2 MG: 0.2 INJECTION, SOLUTION INTRAMUSCULAR; INTRAVENOUS at 15:45

## 2023-10-06 RX ADMIN — FENTANYL CITRATE 100 MCG: 50 INJECTION INTRAMUSCULAR; INTRAVENOUS at 15:45

## 2023-10-06 RX ADMIN — PHENYLEPHRINE HYDROCHLORIDE 50 MCG: 10 INJECTION INTRAVENOUS at 16:02

## 2023-10-06 RX ADMIN — SODIUM CHLORIDE, POTASSIUM CHLORIDE, SODIUM LACTATE AND CALCIUM CHLORIDE: 600; 310; 30; 20 INJECTION, SOLUTION INTRAVENOUS at 13:42

## 2023-10-06 ASSESSMENT — ACTIVITIES OF DAILY LIVING (ADL)
ADLS_ACUITY_SCORE: 36
ADLS_ACUITY_SCORE: 35
ADLS_ACUITY_SCORE: 36

## 2023-10-06 NOTE — ANESTHESIA CARE TRANSFER NOTE
Patient: Scarlet Chapa    Procedure: Procedure(s):  Cystoscopy, left ureteral stent exchange, left ureteroscopy with laser lithotripsy and stone basketing       Diagnosis: Ureteral stone [N20.1]  Diagnosis Additional Information: No value filed.    Anesthesia Type:   General     Note:    Oropharynx: oropharynx clear of all foreign objects  Level of Consciousness: awake  Oxygen Supplementation: face mask  Level of Supplemental Oxygen (L/min / FiO2): 2  Independent Airway: airway patency satisfactory and stable  Dentition: dentition unchanged  Vital Signs Stable: post-procedure vital signs reviewed and stable  Report to RN Given: handoff report given  Patient transferred to: PACU    Handoff Report: Identifed the Patient, Identified the Reponsible Provider, Reviewed the pertinent medical history, Discussed the surgical course, Reviewed Intra-OP anesthesia mangement and issues during anesthesia, Set expectations for post-procedure period and Allowed opportunity for questions and acknowledgement of understanding    Vitals:  Vitals Value Taken Time   BP     Temp     Pulse 86 10/06/23 1633   Resp 15 10/06/23 1633   SpO2 100 % 10/06/23 1633   Vitals shown include unvalidated device data.    Electronically Signed By: Dean Dennis Severson, APRN CRNA  October 6, 2023  4:34 PM

## 2023-10-06 NOTE — ANESTHESIA POSTPROCEDURE EVALUATION
Patient: Scarlet Chapa    Procedure: Procedure(s):  Cystoscopy, left ureteral stent exchange, left ureteroscopy with laser lithotripsy and stone basketing       Anesthesia Type:  General    Note:  Disposition: Outpatient   Postop Pain Control: Uneventful            Sign Out: Well controlled pain   PONV: No   Neuro/Psych: Uneventful            Sign Out: Acceptable/Baseline neuro status   Airway/Respiratory: Uneventful            Sign Out: Acceptable/Baseline resp. status   CV/Hemodynamics: Uneventful            Sign Out: Acceptable CV status; No obvious hypovolemia; No obvious fluid overload   Other NRE: NONE   DID A NON-ROUTINE EVENT OCCUR? No           Last vitals:  Vitals Value Taken Time   /77 10/06/23 1700   Temp 98.5  F (36.9  C) 10/06/23 1700   Pulse 71 10/06/23 1708   Resp 9 10/06/23 1708   SpO2 96 % 10/06/23 1708   Vitals shown include unvalidated device data.    Electronically Signed By: Gerson Mccord MD  October 6, 2023  5:34 PM

## 2023-10-06 NOTE — ANESTHESIA PREPROCEDURE EVALUATION
Anesthesia Pre-Procedure Evaluation    Patient: Scarlet Chapa   MRN: [id number] : 1955        Procedure : Procedure(s):  Cystoscopy, left ureteral stent exchange, left ureteroscopy with laser lithotripsy and stone basketing          Past Medical History:   Diagnosis Date    Arthritis     Chronic kidney disease, stage III (moderate) (H)     Hyperlipidemia     Hypertension     Insomnia     Obese       Past Surgical History:   Procedure Laterality Date    ARTHROPLASTY HIP Left 2023    Procedure: LEFT POSTERIOR TOTAL HIP ARTHROPLASTY;  Surgeon: Nitin Carrero MD;  Location: [facility] OR    COLONOSCOPY N/A 2017    Procedure: COLONOSCOPY;  colonoscopy;  Surgeon: Haile Whitman MD;  Location:  GI    CYSTOSCOPY, RETROGRADES, INSERT STENT URETER(S), COMBINED Left 2023    Procedure: Cystoscopy with left retrograde pyelogram, left ureteral stent placement, intraoperative interpretation of fluoroscopic imaging;  Surgeon: Shreya Dangelo MD;  Location:  OR    ORTHOPEDIC SURGERY Right 2015    Hip athroplasty      Allergies   Allergen Reactions    Gluten Meal Other (See Comments)     abd bloating, gas    Casein Other (See Comments)     Sneezing, watery eyes      Social History     Tobacco Use    Smoking status: Never    Smokeless tobacco: Never   Substance Use Topics    Alcohol use: Yes     Comment: rare      Wt Readings from Last 1 Encounters:   10/06/23 96.7 kg (213 lb 1.6 oz)        Anesthesia Evaluation   Pt has had prior anesthetic. Type: General.        ROS/MED HX  ENT/Pulmonary:  - neg pulmonary ROS     Neurologic:  - neg neurologic ROS     Cardiovascular:     (+)  hypertension- -   -  - -                                      METS/Exercise Tolerance:     Hematologic: Comments: Lab Test        23                       0056          0600          WBC          5.9           --           HGB          12.9         12.0          MCV          86            --            PLT          208           --            Lab Test        09/23/23 09/23/23                       0531          0056          NA           137          134*          POTASSIUM    3.4          4.0           CHLORIDE     104          100           CO2          23           19*           BUN          26.8*        30.7*         CR           1.09*        1.16*         ANIONGAP     10           15            VANGIE          9.3          9.7           GLC          92           91                  Musculoskeletal:  - neg musculoskeletal ROS     GI/Hepatic:  - neg GI/hepatic ROS     Renal/Genitourinary:     (+) renal disease, type: ARF,     Nephrolithiasis ,       Endo:     (+)               Obesity,       Psychiatric/Substance Use:  - neg psychiatric ROS     Infectious Disease:  - neg infectious disease ROS     Malignancy:  - neg malignancy ROS     Other:  - neg other ROS          Physical Exam    Airway        Mallampati: II   TM distance: > 3 FB   Neck ROM: full   Mouth opening: > 3 cm    Respiratory Devices and Support         Dental       (+) Minor Abnormalities - some fillings, tiny chips      Cardiovascular   cardiovascular exam normal          Pulmonary   pulmonary exam normal                OUTSIDE LABS:  CBC:   Lab Results   Component Value Date    WBC 5.9 09/23/2023    HGB 12.9 09/23/2023    HGB 12.0 08/05/2023    HCT 36.9 09/23/2023     09/23/2023     BMP:   Lab Results   Component Value Date     09/23/2023     (L) 09/23/2023    POTASSIUM 3.4 09/23/2023    POTASSIUM 4.0 09/23/2023    CHLORIDE 104 09/23/2023    CHLORIDE 100 09/23/2023    CO2 23 09/23/2023    CO2 19 (L) 09/23/2023    BUN 26.8 (H) 09/23/2023    BUN 30.7 (H) 09/23/2023    CR 1.09 (H) 09/23/2023    CR 1.16 (H) 09/23/2023    GLC 92 09/23/2023    GLC 91 09/23/2023     COAGS: No results found for: PTT, INR, FIBR  POC: No results found for: BGM, HCG, HCGS  HEPATIC: No results found for: ALBUMIN, PROTTOTAL, ALT, AST, GGT, ALKPHOS,  BILITOTAL, BILIDIRECT, BRIANNA  OTHER:   Lab Results   Component Value Date    VANGIE 9.3 09/23/2023       Anesthesia Plan    ASA Status:  3       Anesthesia Type: General.     - Airway: LMA   Induction: Propofol.   Maintenance: Balanced.        Consents    Anesthesia Plan(s) and associated risks, benefits, and realistic alternatives discussed. Questions answered and patient/representative(s) expressed understanding.     - Discussed:     - Discussed with:  Patient      - Extended Intubation/Ventilatory Support Discussed: No.      - Patient is DNR/DNI Status: No     Use of blood products discussed: No .     Postoperative Care    Pain management: IV analgesics.   PONV prophylaxis: Dexamethasone or Solumedrol, Ondansetron (or other 5HT-3)     Comments:                Gerson Mccord MD

## 2023-10-06 NOTE — OP NOTE
OPERATIVE REPORT    PREOPERATIVE DIAGNOSIS: Left ureteral and kidney stones    POSTOPERATIVE DIAGNOSIS: Same    PROCEDURES PERFORMED: Cystoscopy, left ureteral stent exchange, left retrograde pyelogram, interpretation of fluoroscopic images, left ureteroscopy with thulium laser lithotripsy and stone basketing    SURGEON: Aiden Hunter M.D.    ANESTHESIA: General    COMPLICATIONS: None.     SIGNIFICANT FINDINGS:       BRIEF OPERATIVE INDICATIONS: Scarlet Chapa is a(n) 68 year old female who presented with a left ureteral stone as well as kidney stones and previously a stent placed.  She now presents for stone extraction..  After a discussion of all risks, benefits, and alternatives, the patient elected to proceed with definitive stone management. The patient understands the potential need for more than one procedure to eliminate all stone burden.      DESCRIPTION OF PROCEDURE:  After informed consent was obtained, the patient was transported to the operating room & placed supine on the table. After adequate anesthesia was induced, the patient was placed in lithotomy and prepped and draped in the usual sterile fashion. A timeout was taken to confirm correct patient, procedure and laterality. Pre-operative IV antibiotics were administered.     I introduced the 22 Czech rigid cystoscope through the urethra into the bladder and perform cystoscopy.  The bladder was normal throughout.  I grasped the left-sided stent and pulled it to the level of the urethral meatus.  I cannulated the stent with a Glidewire under fluoroscopic guidance then remove the stent.  Alongside the Glidewire passed the rigid ureteroscope through the urethra in spite of the left ureter.  The stone was seen in the mid left ureter.  I used the 200  m thulium laser fiber to break up the stone into multiple fragments.  The fragments were then basketed out and placed in the bladder.  Once the left ureter was free of any stones I passed a second  Glidewire into the left kidney and backloaded off the rigid scope.  Over this second working Glidewire passed the flex ureteroscope into the left kidney under fluoroscopic guidance.  I performed a complete renoscopy.  In the lower pole the left kidney there were tiny stones and stone dust present.  There are 2 slightly larger stones present.  I again used the 200  m laser fiber to break up these larger stones into dust.  Once there were no clinically significant stones remaining I performed retropyelogram through the scope and performed a complete renoscopy to make certain I removed all significant stone burden.  I removed the ureteroscope.  Over the remaining Glidewire passed a 5 x 26 double-J ureteral stent.  The wire was pulled back and a good curl can be seen in the renal pelvis with fluoroscopy.  I inserted the cystoscope and a good curl was seen in the bladder with direct visualization.  I drained the stone from the bladder and sent in for analysis.  The procedure was concluded    The patient taught procedure without complications.  She went to the postanesthetic care unit in good condition.  She will go home from there.  I am seeing her back in 2 weeks for stent removal.  I will recommend follow-up imaging to make certain she does not develop hydronephrosis on this left side due to the impaction of the large stone in the ureter.

## 2023-10-06 NOTE — ANESTHESIA PROCEDURE NOTES
Airway       Patient location during procedure: OR  Staff -        Anesthesiologist:  Gerson Mccord MD       CRNA: Cece Neal APRN CRNA       Performed By: CRNA  Consent for Airway        Urgency: elective  Indications and Patient Condition       Indications for airway management: patricia-procedural and airway protection       Induction type:intravenous       Mask difficulty assessment: 1 - vent by mask    Final Airway Details       Final airway type: supraglottic airway    Supraglottic Airway Details        Type: LMA       Brand: I-Gel       LMA size: 4    Post intubation assessment        Placement verified by: capnometry, equal breath sounds and chest rise        Number of attempts at approach: 1       Number of other approaches attempted: 0       Secured with: commercial tube cuevas       Ease of procedure: easy       Dentition: Intact and Unchanged

## 2023-10-06 NOTE — PROGRESS NOTES
Patient BP remains elevated. Per patient history of elevated BP at medical facilities but monitors at home and is ok. Has follow up appt with primary next week for further monitoring of BP. Denies headache, chest pain, or vision changes. MDA Dr Weems notified, ok to discharge home and follow up with primary, follow up sooner if develop symptoms and continue to monitor at home.

## 2023-10-06 NOTE — DISCHARGE INSTRUCTIONS
CYSTOSCOPY DISCHARGE INSTRUCTIONS  [facility]  SHERMAN LEVINE, & CYNTHIA   [phone]    YOU MAY GO BACK TO YOUR NORMAL DIET AND ACTIVITY, UNLESS YOUR DOCTOR TELLS YOU NOT TO.    FOR THE NEXT TWO DAYS, YOU MAY NOTICE:    SOME BLOOD IN YOUR URINE.  SOME BURNING WHEN YOU URINATE.  AN URGE TO URINATE MORE OFTEN.  BLADDER SPASMS.    THESE ARE NORMAL AFTER THE PROCEDURE.  THEY SHOULD GO AWAY AFTER A DAY OR TWO.  TO RELIEVE THESE PROBLEMS:     DRINK 6 TO 8 LARGE GLASSES OF WATER EACH DAY (INCLUDES DRINKS AT MEALS).  THIS WILL HELP CLEAR THE URINE.    TAKE WARM BATHS TO RELIEVE PAIN AND BLADDER SPASMS.  DO NOT ADD ANYTHING TO THE BATH WATER.    YOUR DOCTOR MAY PRESCRIBE PAIN MEDICINE.  YOU MAY ALSO TAKE TYLENOL (ACETAMINOPHEN) FOR PAIN.    CALL YOUR SURGEON IF YOU HAVE:    A FEVER OVER 101 DEGREES.  CHECK YOUR TEMPERATURE UNDER YOUR TONGUE.    CHILLS.    FAILURE TO URINATE (NO URINE COMES OUT WHEN YOU TRY TO USE THE TOILET).  TRY SOAKING IN A BATHTUB FULL OF WARM WATER.  IF STILL NO URINE, CALL YOUR DOCTOR.    A LOT OF BLOOD IN THE URINE, OR BLOOD CLOTS LARGER THAN A NICKEL.      PAIN IN THE BACK OR BELLY AREA (ABDOMEN).    PAIN OR SPASMS THAT ARE NOT RELIEVED BY WARM TUB BATHS AND PAIN MEDICINE.      SEVERE PAIN, BURNING OR OTHER PROBLEMS WHILE PASSING URINE.    PAIN THAT GETS WORSE AFTER TWO DAYS.       GENERAL ANESTHESIA OR SEDATION ADULT DISCHARGE INSTRUCTIONS   SPECIAL PRECAUTIONS FOR 24 HOURS AFTER SURGERY    IT IS NOT UNUSUAL TO FEEL LIGHT-HEADED OR FAINT, UP TO 24 HOURS AFTER SURGERY OR WHILE TAKING PAIN MEDICATION.  IF YOU HAVE THESE SYMPTOMS; SIT FOR A FEW MINUTES BEFORE STANDING AND HAVE SOMEONE ASSIST YOU WHEN YOU GET UP TO WALK OR USE THE BATHROOM.    YOU SHOULD REST AND RELAX FOR THE NEXT 24 HOURS AND YOU MUST MAKE ARRANGEMENTS TO HAVE SOMEONE STAY WITH YOU FOR AT LEAST 24 HOURS AFTER YOUR DISCHARGE.  AVOID HAZARDOUS AND STRENUOUS ACTIVITIES.  DO NOT MAKE IMPORTANT DECISIONS FOR 24  HOURS.    DO NOT DRIVE ANY VEHICLE OR OPERATE MECHANICAL EQUIPMENT FOR 24 HOURS FOLLOWING THE END OF YOUR SURGERY.  EVEN THOUGH YOU MAY FEEL NORMAL, YOUR REACTIONS MAY BE AFFECTED BY THE MEDICATION YOU HAVE RECEIVED.    DO NOT DRINK ALCOHOLIC BEVERAGES FOR 24 HOURS FOLLOWING YOUR SURGERY.    DRINK CLEAR LIQUIDS (APPLE JUICE, GINGER ALE, 7-UP, BROTH, ETC.).  PROGRESS TO YOUR REGULAR DIET AS YOU FEEL ABLE.    YOU MAY HAVE A DRY MOUTH, A SORE THROAT, MUSCLES ACHES OR TROUBLE SLEEPING.  THESE SHOULD GO AWAY AFTER 24 HOURS.    CALL YOUR DOCTOR FOR ANY OF THE FOLLOWING:  SIGNS OF INFECTION (FEVER, GROWING TENDERNESS AT THE SURGERY SITE, A LARGE AMOUNT OF DRAINAGE OR BLEEDING, SEVERE PAIN, FOUL-SMELLING DRAINAGE, REDNESS OR SWELLING.    IT HAS BEEN OVER 8 TO 10 HOURS SINCE SURGERY AND YOU ARE STILL NOT ABLE TO URINATE (PASS WATER).     Maximum acetaminophen (Tylenol) dose from all sources should not exceed 4 grams (4000 mg) per day.   Maximum ibuprofen (Advil) dose from all sources should not exceed 2.5 grams (2,400 mg) per day.      DR. GARCIA CRUZ M.D.    CLINIC PHONE NUMBER:  [phone]  [facility]

## 2023-10-10 LAB
APPEARANCE STONE: NORMAL
COMPN STONE: NORMAL
SPECIMEN WT: 53 MG

## 2023-10-20 ENCOUNTER — OFFICE VISIT (OUTPATIENT)
Dept: UROLOGY | Facility: CLINIC | Age: 68
End: 2023-10-20
Payer: COMMERCIAL

## 2023-10-20 DIAGNOSIS — N20.1 URETERAL STONE: Primary | ICD-10-CM

## 2023-10-20 DIAGNOSIS — Z79.2 PROPHYLACTIC ANTIBIOTIC: ICD-10-CM

## 2023-10-20 PROCEDURE — 52310 CYSTOSCOPY AND TREATMENT: CPT | Performed by: UROLOGY

## 2023-10-20 RX ORDER — CIPROFLOXACIN 500 MG/1
500 TABLET, FILM COATED ORAL ONCE
Qty: 1 TABLET | Refills: 0 | Status: SHIPPED | OUTPATIENT
Start: 2023-10-20 | End: 2023-10-20

## 2023-10-20 RX ORDER — LIDOCAINE HYDROCHLORIDE 20 MG/ML
JELLY TOPICAL ONCE
Status: COMPLETED | OUTPATIENT
Start: 2023-10-20 | End: 2023-10-20

## 2023-10-20 RX ADMIN — LIDOCAINE HYDROCHLORIDE: 20 JELLY TOPICAL at 11:19

## 2023-10-20 NOTE — PATIENT INSTRUCTIONS
"AFTER YOUR CYSTOSCOPY AND STENT REMOVAL  ?  ?  You have just completed a cystoscopy, or \"cysto\", which allowed your physician to learn more about your bladder (or to remove a stent placed after surgery). We suggest that you continue to avoid caffeine, fruit juice, and alcohol for the next 24 hours, however, you are encouraged to return to your normal activities.  ?  ?  A few things that are considered normal after your cystoscopy:  ?  * small amount of bleeding (or spotting) that clears within the next 24 hours  ?  * slight burning sensation with urination  ?  * sensation of needing to void (urinate) more frequently  ?  * the feeling of \"air\" in your urine  ?  * mild discomfort that is relieved with Tylenol    * bladder spasms  ?  ?  ?  Please contact our office promptly if you:  ?  * develop a fever above 101 degrees  ?  * are unable to urinate  ?  * develop bright red blood that does not stop  ?  * experience severe pain or swelling  ?  ?  ?  And of course, please contact our office with any concerns or questions [phone].  ?    "

## 2023-10-20 NOTE — LETTER
10/20/2023       RE: Scarlet Chapa  [address] [facility] [address]     Dear Colleague,    Thank you for referring your patient, Scarlet Chapa, to the [facility] [place] at [facility]. Please see a copy of my visit note below.    Office Visit Note  [facility]  [phone]    Oct 20, 2023    [unfilled]    1955    UROLOGIC DIAGNOSES:    Left ureteral and kidney stones    CURRENT INTERVENTIONS:    S/P extraction    History:    Navya returns to clinic today for postoperative stent removal.  She has felt well since her procedure.  Stone was composed of calcium oxalate mixed with calcium phosphate.  Her serum calcium levels are normal.      Imaging:     Labs:      MEDICATIONS:    Current Outpatient Medications:     acetaminophen (TYLENOL) 500 MG tablet, Take 1,000 mg by mouth every 6 hours as needed for mild pain, Disp: , Rfl:     amLODIPine (NORVASC) 5 MG tablet, Take 1 tablet (5 mg) by mouth daily, Disp: , Rfl:     amoxicillin (AMOXIL) 500 MG tablet, TAKE 4 TABLETS BY MOUTH 1 HOUR BEFORE DENTAL APPOINTMENT, Disp: , Rfl:     cholecalciferol 50 MCG (2000 UT) CAPS, Take 2,000 Units by mouth daily, Disp: , Rfl:     coenzyme Q-10 100 MG TABS, Take 100 mg by mouth daily, Disp: , Rfl:     cyanocobalamin (VITAMIN B-12) 500 MCG tablet, Take 500 mcg by mouth daily, Disp: , Rfl:     diclofenac (VOLTAREN) 1 % topical gel, Apply 4 g topically 4 times daily as needed for moderate pain, Disp: , Rfl:     fish oil-omega-3 fatty acids 1000 MG capsule, Take 1 g by mouth daily (Patient not taking: Reported on 10/3/2023), Disp: , Rfl:     losartan (COZAAR) 50 MG tablet, Take 1 tablet (50 mg) by mouth daily, Disp: , Rfl:     magnesium 100 MG TABS, Take 1 tablet by mouth daily, Disp: , Rfl:     tamsulosin (FLOMAX) 0.4 MG capsule, Take 1 capsule (0.4 mg) by mouth daily, Disp: 14 capsule, Rfl: 0    Current Facility-Administered  Medications:     lidocaine (XYLOCAINE) 2 % external gel, , Urethral, Once, Aiden Hunter MD    ALLERGIES:     Allergies   Allergen Reactions    Gluten Meal Other (See Comments)     abd bloating, gas    Casein Other (See Comments)     Sneezing, watery eyes       REVIEW OF SYSTEMS: Ten point review of systems without change as outlined in HPI    SURGICAL HISTORY:    Past Surgical History:   Procedure Laterality Date    ARTHROPLASTY HIP Left 8/4/2023    Procedure: LEFT POSTERIOR TOTAL HIP ARTHROPLASTY;  Surgeon: Nitin Carrero MD;  Location: [facility] Main OR    COLONOSCOPY N/A 11/1/2017    Procedure: COLONOSCOPY;  colonoscopy;  Surgeon: Haile Whitman MD;  Location:  GI    CYSTOSCOPY, RETROGRADES, INSERT STENT URETER(S), COMBINED Left 9/23/2023    Procedure: Cystoscopy with left retrograde pyelogram, left ureteral stent placement, intraoperative interpretation of fluoroscopic imaging;  Surgeon: Shreya Dangelo MD;  Location:  OR    LASER HOLMIUM LITHOTRIPSY URETER(S), INSERT STENT, COMBINED Left 10/6/2023    Procedure: Cystoscopy, left ureteral stent exchange, left retrograde pyelogram, interpretation of fluoroscopic images, left ureteroscopy with thulium laser lithotripsy and stone basketing;  Surgeon: Aiden Hunter MD;  Location:  OR    ORTHOPEDIC SURGERY Right 12/2015    Hip athroplasty         PHYSICAL EXAM:    There were no vitals taken for this visit.    Constitutional: Well developed. Conversant and in no acute distress  Eyes: Anicteric sclera, conjunctiva clear, normal extraocular movements  ENT: Normocephalic and atraumatic,   Skin: Warm and dry. No rashes or lesions  Cardiac: No peripheral edema  Back/Flank: Not done  CNS/PNS: Normal musculature and movements, moves all extremities normally  Respiratory: Normal non-labored breathing  Abdomen: Soft nontender and nondistended  Peripheral Vascular: No peripheral edema  Mental Status/Psych: Alert and Oriented x 3. Normal mood  and affect      External Genitalia: Not done  Bladder: Not done  Urethra: Not done   Vagina: Not done    Cystoscopy: I performed flexible cystoscopy today and removed the stent without difficulty      Urinalysis:  UA RESULTS:  Recent Labs   Lab Test 09/22/23 2033   COLOR Light Yellow   APPEARANCE Clear   URINEGLC Negative   URINEBILI Negative   URINEKETONE Negative   SG 1.011   UBLD Negative   URINEPH 5.0   PROTEIN Negative   NITRITE Negative   LEUKEST Large*   RBCU 1   WBCU 13*         IMPRESSION:    Doing well, stent removed    PLAN:    Her ureteral stone was quite impacted and this does raise the risk of development of a ureteral stricture.  I recommended that she have a renal ultrasound in 3 months to rule out the formation of hydronephrosis.  I will have her follow-up with our physician assistant for the results.  At that time if she has not developed any hydronephrosis she can be discharged from the urology clinic.      Aiden Hunter M.D.

## 2023-10-20 NOTE — NURSING NOTE
Chief Complaint   Patient presents with    ureteral stone     Stent removal     Prior to the start of the procedure and with procedural staff participation, I verbally confirmed the patient s identity using two indicators, relevant allergies, that the procedure was appropriate and matched the consent or emergent situation, and that the correct equipment/implants were available. Immediately prior to starting the procedure I conducted the Time Out with the procedural staff and re-confirmed the patient s name, procedure, and site/side. I have wiped the patient off with the povidone-Iodine solution, draped them, and used Lidocaine hydrochloride jelly. (The Joint Commission universal protocol was followed.)  Yes    Sedation (Moderate or Deep): None    5mL 2% lidocaine hydrochloride Urojet instilled into urethra.    NDC# [address]-6  Lot #: [id number]  Expiration Date:  2/25    Cherelle Pandya EMT

## 2023-10-20 NOTE — PROGRESS NOTES
Office Visit Note  [facility]  [phone]    Oct 20, 2023    [unfilled]    1955    UROLOGIC DIAGNOSES:    Left ureteral and kidney stones    CURRENT INTERVENTIONS:    S/P extraction    History:    Navya returns to clinic today for postoperative stent removal.  She has felt well since her procedure.  Stone was composed of calcium oxalate mixed with calcium phosphate.  Her serum calcium levels are normal.      Imaging:     Labs:      MEDICATIONS:    Current Outpatient Medications:     acetaminophen (TYLENOL) 500 MG tablet, Take 1,000 mg by mouth every 6 hours as needed for mild pain, Disp: , Rfl:     amLODIPine (NORVASC) 5 MG tablet, Take 1 tablet (5 mg) by mouth daily, Disp: , Rfl:     amoxicillin (AMOXIL) 500 MG tablet, TAKE 4 TABLETS BY MOUTH 1 HOUR BEFORE DENTAL APPOINTMENT, Disp: , Rfl:     cholecalciferol 50 MCG (2000 UT) CAPS, Take 2,000 Units by mouth daily, Disp: , Rfl:     coenzyme Q-10 100 MG TABS, Take 100 mg by mouth daily, Disp: , Rfl:     cyanocobalamin (VITAMIN B-12) 500 MCG tablet, Take 500 mcg by mouth daily, Disp: , Rfl:     diclofenac (VOLTAREN) 1 % topical gel, Apply 4 g topically 4 times daily as needed for moderate pain, Disp: , Rfl:     fish oil-omega-3 fatty acids 1000 MG capsule, Take 1 g by mouth daily (Patient not taking: Reported on 10/3/2023), Disp: , Rfl:     losartan (COZAAR) 50 MG tablet, Take 1 tablet (50 mg) by mouth daily, Disp: , Rfl:     magnesium 100 MG TABS, Take 1 tablet by mouth daily, Disp: , Rfl:     tamsulosin (FLOMAX) 0.4 MG capsule, Take 1 capsule (0.4 mg) by mouth daily, Disp: 14 capsule, Rfl: 0    Current Facility-Administered Medications:     lidocaine (XYLOCAINE) 2 % external gel, , Urethral, Once, Aiden Hunter MD    ALLERGIES:     Allergies   Allergen Reactions    Gluten Meal Other (See Comments)     abd bloating, gas    Casein Other (See Comments)     Sneezing, watery eyes       REVIEW OF SYSTEMS: Ten point review of systems without  change as outlined in HPI    SURGICAL HISTORY:    Past Surgical History:   Procedure Laterality Date    ARTHROPLASTY HIP Left 8/4/2023    Procedure: LEFT POSTERIOR TOTAL HIP ARTHROPLASTY;  Surgeon: Nitin Carrero MD;  Location: [facility] Main OR    COLONOSCOPY N/A 11/1/2017    Procedure: COLONOSCOPY;  colonoscopy;  Surgeon: Haile Whitman MD;  Location:  GI    CYSTOSCOPY, RETROGRADES, INSERT STENT URETER(S), COMBINED Left 9/23/2023    Procedure: Cystoscopy with left retrograde pyelogram, left ureteral stent placement, intraoperative interpretation of fluoroscopic imaging;  Surgeon: Shreya Dangelo MD;  Location:  OR    LASER HOLMIUM LITHOTRIPSY URETER(S), INSERT STENT, COMBINED Left 10/6/2023    Procedure: Cystoscopy, left ureteral stent exchange, left retrograde pyelogram, interpretation of fluoroscopic images, left ureteroscopy with thulium laser lithotripsy and stone basketing;  Surgeon: Aiden Hunter MD;  Location:  OR    ORTHOPEDIC SURGERY Right 12/2015    Hip athroplasty         PHYSICAL EXAM:    There were no vitals taken for this visit.    Constitutional: Well developed. Conversant and in no acute distress  Eyes: Anicteric sclera, conjunctiva clear, normal extraocular movements  ENT: Normocephalic and atraumatic,   Skin: Warm and dry. No rashes or lesions  Cardiac: No peripheral edema  Back/Flank: Not done  CNS/PNS: Normal musculature and movements, moves all extremities normally  Respiratory: Normal non-labored breathing  Abdomen: Soft nontender and nondistended  Peripheral Vascular: No peripheral edema  Mental Status/Psych: Alert and Oriented x 3. Normal mood and affect      External Genitalia: Not done  Bladder: Not done  Urethra: Not done   Vagina: Not done    Cystoscopy: I performed flexible cystoscopy today and removed the stent without difficulty      Urinalysis:  UA RESULTS:  Recent Labs   Lab Test 09/22/23 2033   COLOR Light Yellow   APPEARANCE Clear   URINEGLC Negative    URINEBILI Negative   URINEKETONE Negative   SG 1.011   UBLD Negative   URINEPH 5.0   PROTEIN Negative   NITRITE Negative   LEUKEST Large*   RBCU 1   WBCU 13*         IMPRESSION:    Doing well, stent removed    PLAN:    Her ureteral stone was quite impacted and this does raise the risk of development of a ureteral stricture.  I recommended that she have a renal ultrasound in 3 months to rule out the formation of hydronephrosis.  I will have her follow-up with our physician assistant for the results.  At that time if she has not developed any hydronephrosis she can be discharged from the urology clinic.      Aiden Hunter M.D.

## 2024-01-18 ENCOUNTER — HOSPITAL ENCOUNTER (OUTPATIENT)
Dept: ULTRASOUND IMAGING | Facility: CLINIC | Age: 69
Discharge: HOME | End: 2024-01-18
Attending: UROLOGY | Admitting: UROLOGY
Payer: COMMERCIAL

## 2024-01-18 DIAGNOSIS — N20.1 URETERAL STONE: ICD-10-CM

## 2024-01-18 PROCEDURE — 76770 US EXAM ABDO BACK WALL COMP: CPT

## 2024-02-13 ENCOUNTER — VIRTUAL VISIT (OUTPATIENT)
Dept: UROLOGY | Facility: CLINIC | Age: 69
End: 2024-02-13
Payer: COMMERCIAL

## 2024-02-13 DIAGNOSIS — Z87.442 HISTORY OF NEPHROLITHIASIS: Primary | ICD-10-CM

## 2024-02-13 PROCEDURE — 99213 OFFICE O/P EST LOW 20 MIN: CPT | Mod: 95 | Performed by: NURSE PRACTITIONER

## 2024-02-13 ASSESSMENT — PAIN SCALES - GENERAL: PAINLEVEL: NO PAIN (0)

## 2024-02-13 NOTE — NURSING NOTE
Is the patient currently in the state of [place]? YES    Visit mode:VIDEO    If the visit is dropped, the patient can be reconnected by: VIDEO VISIT: Text to cell phone:   Telephone Information:   Mobile [phone]       Will anyone else be joining the visit? NO  (If patient encounters technical issues they should call [phone]    How would you like to obtain your AVS? MyChart    Are changes needed to the allergy or medication list? No    Reason for visit: RECHECK    Kera STACK

## 2024-02-13 NOTE — PATIENT INSTRUCTIONS
OXALATE DIET    Beverages to avoid:  Draft beer  Ovaltine  Cocoa    Recommended beverages:  Coffee  Beer (bottle)  Carbonated soda  Distilled alcohol  Lemonade  Wine: red, forest, white  Buttermilk, Whole, lowfat or skim milk  Yogurt with allowed  fruits  Soy, almond and rice  Milk    Vegetables to avoid:  Beets**  greens  Collards  Kale  Leeks  Mustard greens  Okra  Parsley**  Sweet potato**  Rutabagas  Spinach**  Swiss chard**  Watercress**    Recommended vegetables:  Asparagus  Broccoli  Carrots  Corn: sweet, white  Cucumber, peeled  Green peas, canned  Lettuce  Lima beans  Parsnips  Tomato, 1 small, juice  Turnips  Avocado  Canaan sprouts  Cauliflower  Cabbage  Mushrooms  Onions  Peas, green  White potato  Radish    Recommended Meat / Meat Substitutes:  Eggs  Cheese  Beef, lamb or pork  Poultry  Fish and shellfish  Sardines    Miscellaneous foods to avoid:  Nuts**  Peanuts, almonds,  pecans, cashews  Chocolate**,  Cocoa,**  Vegetable soup,  Marmalade  Peanut butter    Miscellaneous recommend foods:  [place]hipley  Mayonnaise  Salad dressing  Vegetable oils  Butter, margarine  Coconut  Jelly or preserves (made with allowed  fruits)  Lemon, lime juice  Salt, pepper  Cornbread  Sponge cake  Spaghetti, canned in tomato sauce  Rice  Quinoa  All bread    Fruits to avoid:  Currants, red  Dewberries  Grapes, purple  Gooseberries  Lemon peel**  Lime peel**  Orange peel**  Rhubarb**    Recommended fruits:  Apple  Apricots  Cherries, red, sour  Cranberry juice  Grape juice  Orange, fruit and juice  Peaches  Pears  Pineapple, plum, purple  Prunes  Apple juice  Banana  Cherries, ish  Mangos  Melons, cantaloupe, cassava honeydew,  watermelon  Nectarines  Peaches  Pineapple juice  Plums, green or yellow    ** = very high in oxalate         A diet high in salt (sodium) causes calcium to build in your urine. Too much calcium in your urine can lead to new stones. Avoiding highly processed foods like fast food is the best way to reduce  sodium intake. You can use as much table salt as you like.    Calcium is a nutrient that is found in dairy products, such as yogurt, milk and cheese. You need to eat calcium so that it can bind with oxalate in the stomach and intestines before it moves to the kidneys. Eating foods with calcium is a good way for oxalates to leave the body and not form stones. The best way to get calcium into your body is through the foods you eat.     Avoid vitamin C supplements.    Drink enough fluids. The number one thing you can do is to drink enough fluids, like water. Your urine should ideally be a light straw color. Drinking enough fluids will dilute your urine and make it harder for chemicals to form stones.

## 2024-02-13 NOTE — PROGRESS NOTES
Consult conducted via real-time audio/video technology by VANDANA Ba CNP from [facility] to the patient in their home. Patient consented to this billed visit that was started at 8:02 and completed at 8:08.    Urology Virtual Visit    Name: Scarlet Chapa    MRN: [id number]   YOB: 1955               Chief Complaint:   Hx nephrolithiasis          Impression and Plan:   Impression / Plan:   Scarlet Chapa is a 68 year old female with hx of nephrolithiasis, here for follow up on renal US.      - Her US from 1/18 shows no hydro.   - We discussed general stone prevention measures.  - Pt was provided with low oxalate diet and other stone prevention information.  - XR KUB in 1 yr to monitor for new stone formation ordered.     Thank you for the opportunity to participate in the care of Scarlet Chapa.     VANDANA Jean, CNP   [facility] - Department of Urology  [phone]          History of Present Illness:   Scarlet Chapa is a 68 year old female with history of L ureteral stone s/p URS w Dr. Hunter 10-6-23. Her ureteral stone was quite impacted and this does raise the risk of development of a ureteral stricture. An US was subsequently ordered to rule out the formation of hydronephrosis. Her US from 1/18 shows no hydro.     Calculi composed primarily of:  70% calcium oxalate monohydrate,  20% calcium oxalate dihydrate, and  10% uric acid     History is obtained from patient & EMR    Pertinent imaging reviewed:  US RENAL COMPLETE NON-VASCULAR 1/18/2024 8:24 AM   IMPRESSION:  1.  No hydronephrosis in either kidney. No sonographically evident  renal calculi bilaterally. The previously seen left renal calculi were  not seen sonographically          Past Medical History:     Past Medical History:   Diagnosis Date    Arthritis     Chronic kidney disease, stage III (moderate) (H)     Hyperlipidemia     Hypertension     Insomnia     Obese              Past Surgical History:     Past Surgical History:   Procedure Laterality Date    ARTHROPLASTY HIP Left 8/4/2023    Procedure: LEFT POSTERIOR TOTAL HIP ARTHROPLASTY;  Surgeon: Nitin Carrero MD;  Location: [facility] Main OR    COLONOSCOPY N/A 11/1/2017    Procedure: COLONOSCOPY;  colonoscopy;  Surgeon: Haile Whitman MD;  Location:  GI    CYSTOSCOPY, RETROGRADES, INSERT STENT URETER(S), COMBINED Left 9/23/2023    Procedure: Cystoscopy with left retrograde pyelogram, left ureteral stent placement, intraoperative interpretation of fluoroscopic imaging;  Surgeon: Shreya Dangelo MD;  Location: [facility] OR    LASER HOLMIUM LITHOTRIPSY URETER(S), INSERT STENT, COMBINED Left 10/6/2023    Procedure: Cystoscopy, left ureteral stent exchange, left retrograde pyelogram, interpretation of fluoroscopic images, left ureteroscopy with thulium laser lithotripsy and stone basketing;  Surgeon: Aiden Hunter MD;  Location:  OR    ORTHOPEDIC SURGERY Right 12/2015    Hip athroplasty            Social History:     Social History     Tobacco Use    Smoking status: Never    Smokeless tobacco: Never   Substance Use Topics    Alcohol use: Yes     Comment: rare            Family History:     Family History   Problem Relation Age of Onset    Colon Cancer No family hx of             Allergies:     Allergies   Allergen Reactions    Gluten Meal Other (See Comments)     abd bloating, gas    Casein Other (See Comments)     Sneezing, watery eyes            Medications:     Current Outpatient Medications   Medication Sig    amLODIPine (NORVASC) 5 MG tablet Take 1 tablet (5 mg) by mouth daily    cholecalciferol 50 MCG (2000 UT) CAPS Take 2,000 Units by mouth daily    coenzyme Q-10 100 MG TABS Take 100 mg by mouth daily    cyanocobalamin (VITAMIN B-12) 500 MCG tablet Take 500 mcg by mouth daily    fish oil-omega-3 fatty acids 1000 MG capsule Take 1 g by mouth daily    losartan (COZAAR) 50 MG tablet Take 1 tablet (50 mg) by mouth  "daily    magnesium 100 MG TABS Take 1 tablet by mouth daily    acetaminophen (TYLENOL) 500 MG tablet Take 1,000 mg by mouth every 6 hours as needed for mild pain (Patient not taking: Reported on 10/20/2023)    amoxicillin (AMOXIL) 500 MG tablet TAKE 4 TABLETS BY MOUTH 1 HOUR BEFORE DENTAL APPOINTMENT (Patient not taking: Reported on 10/20/2023)    diclofenac (VOLTAREN) 1 % topical gel Apply 4 g topically 4 times daily as needed for moderate pain (Patient not taking: Reported on 10/20/2023)    tamsulosin (FLOMAX) 0.4 MG capsule Take 1 capsule (0.4 mg) by mouth daily (Patient not taking: Reported on 10/20/2023)     No current facility-administered medications for this visit.             Review of Systems:    ROS: See HPI for pertinent details.  Remainder of 10-point ROS negative.         Physical Exam:   VS:  T: Data Unavailable    HR: Data Unavailable    BP: Data Unavailable    RR: Data Unavailable     GEN:  Alert.  NAD.    HEENT:  Sclerae anicteric.    CV:  No obvious jugular venous distension  LUNGS: No respiratory distress, breathing comfortably wo accessory muscle use.  SKIN:  No visible rash  NEURO:  CN grossly intact.             Data:   All laboratory data reviewed:    No results found for: \"PSA\"  Lab Results   Component Value Date    CR 1.09 09/23/2023    CR 1.16 09/23/2023     Lab Results   Component Value Date    GFRESTIMATED 56 09/23/2023    GFRESTIMATED 52 09/23/2023     Color Urine (no units)   Date Value   09/22/2023 Light Yellow     Appearance Urine (no units)   Date Value   09/22/2023 Clear     Glucose Urine (mg/dL)   Date Value   09/22/2023 Negative     Bilirubin Urine (no units)   Date Value   09/22/2023 Negative     Ketones Urine (mg/dL)   Date Value   09/22/2023 Negative     Specific Gravity Urine (no units)   Date Value   09/22/2023 1.011     pH Urine (no units)   Date Value   09/22/2023 5.0     Protein Albumin Urine (mg/dL)   Date Value   09/22/2023 Negative     Nitrite Urine (no units) "   Date Value   09/22/2023 Negative     Leukocyte Esterase Urine (no units)   Date Value   09/22/2023 Large (A)

## 2024-02-13 NOTE — LETTER
2/13/2024       RE: Scarlet Chapa  [address]  [facility] [address]     Dear Colleague,    Thank you for referring your patient, Scarlet Chapa, to the [facility] [place] at [facility]. Please see a copy of my visit note below.    Consult conducted via real-time audio/video technology by VANDANA Ba CNP from [facility] to the patient in their home. Patient consented to this billed visit that was started at 8:02 and completed at 8:08.    Urology Virtual Visit    Name: Scarlet Chapa    MRN: [id number]   YOB: 1955               Chief Complaint:   Hx nephrolithiasis          Impression and Plan:   Impression / Plan:   Scarlet Chapa is a 68 year old female with hx of nephrolithiasis, here for follow up on renal US.      - Her US from 1/18 shows no hydro.   - We discussed general stone prevention measures.  - Pt was provided with low oxalate diet and other stone prevention information.  - XR KUB in 1 yr to monitor for new stone formation ordered.     Thank you for the opportunity to participate in the care of Scarlet Chapa.     VANDANA Jean, CNP   Phys[facility] - Department of Urology  [phone]          History of Present Illness:   Scarlet Chapa is a 68 year old female with history of L ureteral stone s/p URS w Dr. Hunter 10-6-23. Her ureteral stone was quite impacted and this does raise the risk of development of a ureteral stricture. An US was subsequently ordered to rule out the formation of hydronephrosis. Her US from 1/18 shows no hydro.     Calculi composed primarily of:  70% calcium oxalate monohydrate,  20% calcium oxalate dihydrate, and  10% uric acid     History is obtained from patient & EMR    Pertinent imaging reviewed:  US RENAL COMPLETE NON-VASCULAR 1/18/2024 8:24 AM   IMPRESSION:  1.  No hydronephrosis in either kidney. No sonographically  evident  renal calculi bilaterally. The previously seen left renal calculi were  not seen sonographically          Past Medical History:     Past Medical History:   Diagnosis Date    Arthritis     Chronic kidney disease, stage III (moderate) (H)     Hyperlipidemia     Hypertension     Insomnia     Obese             Past Surgical History:     Past Surgical History:   Procedure Laterality Date    ARTHROPLASTY HIP Left 8/4/2023    Procedure: LEFT POSTERIOR TOTAL HIP ARTHROPLASTY;  Surgeon: Nitin Carrero MD;  Location: [facility] Main OR    COLONOSCOPY N/A 11/1/2017    Procedure: COLONOSCOPY;  colonoscopy;  Surgeon: Haile Whitman MD;  Location:  GI    CYSTOSCOPY, RETROGRADES, INSERT STENT URETER(S), COMBINED Left 9/23/2023    Procedure: Cystoscopy with left retrograde pyelogram, left ureteral stent placement, intraoperative interpretation of fluoroscopic imaging;  Surgeon: Shreya Dangelo MD;  Location:  OR    LASER HOLMIUM LITHOTRIPSY URETER(S), INSERT STENT, COMBINED Left 10/6/2023    Procedure: Cystoscopy, left ureteral stent exchange, left retrograde pyelogram, interpretation of fluoroscopic images, left ureteroscopy with thulium laser lithotripsy and stone basketing;  Surgeon: Aiden Hunter MD;  Location:  OR    ORTHOPEDIC SURGERY Right 12/2015    Hip athroplasty            Social History:     Social History     Tobacco Use    Smoking status: Never    Smokeless tobacco: Never   Substance Use Topics    Alcohol use: Yes     Comment: rare            Family History:     Family History   Problem Relation Age of Onset    Colon Cancer No family hx of             Allergies:     Allergies   Allergen Reactions    Gluten Meal Other (See Comments)     abd bloating, gas    Casein Other (See Comments)     Sneezing, watery eyes            Medications:     Current Outpatient Medications   Medication Sig    amLODIPine (NORVASC) 5 MG tablet Take 1 tablet (5 mg) by mouth daily    cholecalciferol 50 MCG  "(2000 UT) CAPS Take 2,000 Units by mouth daily    coenzyme Q-10 100 MG TABS Take 100 mg by mouth daily    cyanocobalamin (VITAMIN B-12) 500 MCG tablet Take 500 mcg by mouth daily    fish oil-omega-3 fatty acids 1000 MG capsule Take 1 g by mouth daily    losartan (COZAAR) 50 MG tablet Take 1 tablet (50 mg) by mouth daily    magnesium 100 MG TABS Take 1 tablet by mouth daily    acetaminophen (TYLENOL) 500 MG tablet Take 1,000 mg by mouth every 6 hours as needed for mild pain (Patient not taking: Reported on 10/20/2023)    amoxicillin (AMOXIL) 500 MG tablet TAKE 4 TABLETS BY MOUTH 1 HOUR BEFORE DENTAL APPOINTMENT (Patient not taking: Reported on 10/20/2023)    diclofenac (VOLTAREN) 1 % topical gel Apply 4 g topically 4 times daily as needed for moderate pain (Patient not taking: Reported on 10/20/2023)    tamsulosin (FLOMAX) 0.4 MG capsule Take 1 capsule (0.4 mg) by mouth daily (Patient not taking: Reported on 10/20/2023)     No current facility-administered medications for this visit.             Review of Systems:    ROS: See HPI for pertinent details.  Remainder of 10-point ROS negative.         Physical Exam:   VS:  T: Data Unavailable    HR: Data Unavailable    BP: Data Unavailable    RR: Data Unavailable     GEN:  Alert.  NAD.    HEENT:  Sclerae anicteric.    CV:  No obvious jugular venous distension  LUNGS: No respiratory distress, breathing comfortably wo accessory muscle use.  SKIN:  No visible rash  NEURO:  CN grossly intact.             Data:   All laboratory data reviewed:    No results found for: \"PSA\"  Lab Results   Component Value Date    CR 1.09 09/23/2023    CR 1.16 09/23/2023     Lab Results   Component Value Date    GFRESTIMATED 56 09/23/2023    GFRESTIMATED 52 09/23/2023     Color Urine (no units)   Date Value   09/22/2023 Light Yellow     Appearance Urine (no units)   Date Value   09/22/2023 Clear     Glucose Urine (mg/dL)   Date Value   09/22/2023 Negative     Bilirubin Urine (no units)   Date " Value   09/22/2023 Negative     Ketones Urine (mg/dL)   Date Value   09/22/2023 Negative     Specific Gravity Urine (no units)   Date Value   09/22/2023 1.011     pH Urine (no units)   Date Value   09/22/2023 5.0     Protein Albumin Urine (mg/dL)   Date Value   09/22/2023 Negative     Nitrite Urine (no units)   Date Value   09/22/2023 Negative     Leukocyte Esterase Urine (no units)   Date Value   09/22/2023 Large (A)

## 2024-09-13 ENCOUNTER — APPOINTMENT (OUTPATIENT)
Dept: GENERAL RADIOLOGY | Facility: CLINIC | Age: 69
End: 2024-09-13
Attending: PHYSICIAN ASSISTANT
Payer: COMMERCIAL

## 2024-09-13 ENCOUNTER — HOSPITAL ENCOUNTER (EMERGENCY)
Facility: CLINIC | Age: 69
Discharge: HOME | End: 2024-09-13
Attending: PHYSICIAN ASSISTANT | Admitting: PHYSICIAN ASSISTANT
Payer: COMMERCIAL

## 2024-09-13 VITALS
OXYGEN SATURATION: 98 % | TEMPERATURE: 99.1 F | RESPIRATION RATE: 18 BRPM | SYSTOLIC BLOOD PRESSURE: 176 MMHG | WEIGHT: 223.77 LBS | DIASTOLIC BLOOD PRESSURE: 91 MMHG | BODY MASS INDEX: 37.83 KG/M2 | HEART RATE: 98 BPM

## 2024-09-13 DIAGNOSIS — S22.000A COMPRESSION FRACTURE OF THORACIC VERTEBRA, UNSPECIFIED THORACIC VERTEBRAL LEVEL, INITIAL ENCOUNTER (H): ICD-10-CM

## 2024-09-13 PROCEDURE — 72072 X-RAY EXAM THORAC SPINE 3VWS: CPT

## 2024-09-13 PROCEDURE — 99283 EMERGENCY DEPT VISIT LOW MDM: CPT

## 2024-09-13 PROCEDURE — 72100 X-RAY EXAM L-S SPINE 2/3 VWS: CPT

## 2024-09-13 RX ORDER — OXYCODONE HYDROCHLORIDE 5 MG/1
5 TABLET ORAL EVERY 6 HOURS PRN
Qty: 10 TABLET | Refills: 0 | Status: SHIPPED | OUTPATIENT
Start: 2024-09-13

## 2024-09-13 RX ORDER — OXYCODONE HYDROCHLORIDE 5 MG/1
5 TABLET ORAL ONCE
Status: COMPLETED | OUTPATIENT
Start: 2024-09-13 | End: 2024-09-13

## 2024-09-13 ASSESSMENT — ACTIVITIES OF DAILY LIVING (ADL)
ADLS_ACUITY_SCORE: 35
ADLS_ACUITY_SCORE: 37
ADLS_ACUITY_SCORE: 35

## 2024-09-13 ASSESSMENT — COLUMBIA-SUICIDE SEVERITY RATING SCALE - C-SSRS
2. HAVE YOU ACTUALLY HAD ANY THOUGHTS OF KILLING YOURSELF IN THE PAST MONTH?: NO
6. HAVE YOU EVER DONE ANYTHING, STARTED TO DO ANYTHING, OR PREPARED TO DO ANYTHING TO END YOUR LIFE?: NO
1. IN THE PAST MONTH, HAVE YOU WISHED YOU WERE DEAD OR WISHED YOU COULD GO TO SLEEP AND NOT WAKE UP?: NO

## 2024-09-14 NOTE — ED PROVIDER NOTES
Emergency Department Note      History of Present Illness     Chief Complaint   Back Pain      HPI   Scarlet Chapa is a 69 year old female is for evaluation of mid to low back pain.  She notes sudden onset of this while doing PT exercises for some SI and pelvis/hip issues yesterday.  The pain is worse when she bends twists or changes position.  She denies any direct trauma but does have a history of osteoporosis.  It is not painful when she breathes in and she does not feel short of breath.  She denies any urinary symptoms such as dysuria hematuria or bowel/bladder incontinence or retention.  No numbness in the groin or legs no weakness in the legs and the pain does not shoot down the legs.  Denies fever chills or IV drug use.  No history of malignancy.  No abdominal pain.  She has been taking Tylenol 1 g 3 times daily for the last month or so for the other issues but has not tried anything new for the back. It hurts on both the right and left side of her spine.    Independent Historian   None    Review of External Notes   Reviewed Rajani Reaves PA-C discharge summary from [facility] on 9/22/23 after pt discharged w/ dx of L ureteral stone s/p stent placement.    Past Medical History     Medical History and Problem List   Past Medical History:   Diagnosis Date    Arthritis     Chronic kidney disease, stage III (moderate) (H)     Hyperlipidemia     Hypertension     Insomnia     Obese        Medications   oxyCODONE (ROXICODONE) 5 MG tablet  acetaminophen (TYLENOL) 500 MG tablet  amLODIPine (NORVASC) 5 MG tablet  amoxicillin (AMOXIL) 500 MG tablet  cholecalciferol 50 MCG (2000 UT) CAPS  coenzyme Q-10 100 MG TABS  cyanocobalamin (VITAMIN B-12) 500 MCG tablet  diclofenac (VOLTAREN) 1 % topical gel  fish oil-omega-3 fatty acids 1000 MG capsule  losartan (COZAAR) 50 MG tablet  magnesium 100 MG TABS  tamsulosin (FLOMAX) 0.4 MG capsule        Surgical History   Past Surgical History:   Procedure Laterality Date     ARTHROPLASTY HIP Left 8/4/2023    Procedure: LEFT POSTERIOR TOTAL HIP ARTHROPLASTY;  Surgeon: Nitin Carrero MD;  Location: [facility] Main OR    COLONOSCOPY N/A 11/1/2017    Procedure: COLONOSCOPY;  colonoscopy;  Surgeon: Haile Whitman MD;  Location:  GI    CYSTOSCOPY, RETROGRADES, INSERT STENT URETER(S), COMBINED Left 9/23/2023    Procedure: Cystoscopy with left retrograde pyelogram, left ureteral stent placement, intraoperative interpretation of fluoroscopic imaging;  Surgeon: Shreya Dangelo MD;  Location:  OR    LASER HOLMIUM LITHOTRIPSY URETER(S), INSERT STENT, COMBINED Left 10/6/2023    Procedure: Cystoscopy, left ureteral stent exchange, left retrograde pyelogram, interpretation of fluoroscopic images, left ureteroscopy with thulium laser lithotripsy and stone basketing;  Surgeon: Aiden Hunter MD;  Location:  OR    ORTHOPEDIC SURGERY Right 12/2015    Hip athroplasty       Physical Exam     Patient Vitals for the past 24 hrs:   BP Temp Temp src Pulse Resp SpO2 Weight   09/13/24 1919 (!) 176/91 99.1  F (37.3  C) Temporal 98 18 98 % 101.5 kg (223 lb 12.3 oz)     Physical Exam  General: Awake, alert, non-toxic.  Head:  Scalp is NC/AT  Eyes:  Conjunctiva normal  Neck:  Normal range of motion without rigidity.  CV:  Regular rate and rhythm    No pathologic murmur, rubs, or gallops.  Resp:  Breath sounds are clear bilaterally    Non-labored, no retractions or accessory muscle use  Abdomen: Abdomen is soft, no distension, no tenderness, no masses, no CVA ttp  MS:  No lower extremity edema/swelling. No midline cervical, thoracic, or lumbar tenderness.  BL upper lumbar and lower thoracic paraspinal ttp and reproducible pain w/positional change.  No joint swelling, redness ranging well.  Skin:  Warm and dry, No rash or lesions noted.  Neuro:  Alert and oriented. GCS 15. No facial asymmetry. Moves upper extremities normally.  Gait normal    5/5 strength BL to hip flexion/extension    5/5  strength BL to knee flexion/extension    5/5 strength BL to ankle dorsi/plantar flexion    5/5 strength BL to great toe dorsi/plantar flexion    Normal sensation touch throughout BL LE. 2+ patellar/achilles reflexes BL  Psych: Awake. Normal affect.  Cooperative.      Diagnostics     Lab Results   Labs Ordered and Resulted from Time of ED Arrival to Time of ED Departure - No data to display    Imaging   Thoracic spine XR, 3 views   Final Result   IMPRESSION:    THORACIC SPINE: Mild leftward curvature of the upper thoracic spine and mild rightward curvature of the midthoracic spine. Sagittal alignment is normal. Multiple mild compression deformities in the midthoracic spine. Multilevel disc space narrowing.    Visualized soft tissues are unremarkable.      LUMBAR SPINE: Slight leftward curvature of the midlumbar spine. Trace anterolisthesis of L4 on L5. Lumbar vertebral body heights appear maintained. Mild disc space narrowing and facet hypertrophy, most pronounced in the lower lumbar spine. Partially    visualized left hip arthroplasty. Probable calcified fibroid in the pelvis.         Lumbar spine XR, 2-3 views   Final Result   IMPRESSION:    THORACIC SPINE: Mild leftward curvature of the upper thoracic spine and mild rightward curvature of the midthoracic spine. Sagittal alignment is normal. Multiple mild compression deformities in the midthoracic spine. Multilevel disc space narrowing.    Visualized soft tissues are unremarkable.      LUMBAR SPINE: Slight leftward curvature of the midlumbar spine. Trace anterolisthesis of L4 on L5. Lumbar vertebral body heights appear maintained. Mild disc space narrowing and facet hypertrophy, most pronounced in the lower lumbar spine. Partially    visualized left hip arthroplasty. Probable calcified fibroid in the pelvis.           Independent Interpretation   X-ray spine shows multiple mild thoracic compression fractures.  No evidence of bony lesion.  Lumbar spine x-ray  normal    ED Course      Medications Administered   Medications   oxyCODONE (ROXICODONE) tablet 5 mg (5 mg Oral Not Given 9/13/24 6491)       Procedures   Procedures     Discussion of Management   None    ED Course        Additional Documentation  None    Medical Decision Making / Diagnosis     CMS Diagnoses: None    MIPS       None    [facility]   Scarlet Chapa is a 69 year old female with a history of osteoporosis who presents for evaluation of atraumatic low to mid back pain.  Broad differential considered.  Given age and osteoporosis did obtain x-rays which does show several mild compression fractures thoracic spine.  Certainly possible this is contributing to her symptoms versus muscular strain.  She does not have any evidence of cord compression or injury and I do not think emergent MRI or CT is indicated at this time fractures without any factors to suggest instability.  Considered alternative etiologies such as cauda equina, cord compression, transverse myelitis, epidural abscess, discitis, osteomyelitis, epidural hematoma etc. I feel he is a very unlikely do not feel MRI is indicated.  Her symptoms are clearly reproducible no urinary symptoms to suggest pyelonephritis or kidney stone.  Abdominal exam is benign and nontender not suggestive of referred pain from AAA appendicitis or other vascular or intra-abdominal catastrophe.  Likewise nothing to suggest ACS PE dissection etc.  Pain is controlled at this time without any interventions.  She would like a short course of pain medication for home which I will provide.  She can use that along with Tylenol.  All of her follow-up with orthopedic spine as an outpatient but there is no indication for emergent consult today.  Return precautions for neurologic symptoms bowel or bladder incontinence etc. discussed.    Disposition   The patient was discharged.     Diagnosis     ICD-10-CM    1. Compression fracture of thoracic vertebra, unspecified thoracic vertebral level,  initial encounter (H)  S22.000A            Discharge Medications   Discharge Medication List as of 9/13/2024 10:40 PM        START taking these medications    Details   oxyCODONE (ROXICODONE) 5 MG tablet Take 1 tablet (5 mg) by mouth every 6 hours as needed for moderate to severe pain., Disp-10 tablet, R-0, E-Prescribe                  Conor Scherer PA-C  09/14/24 1038

## 2024-09-14 NOTE — DISCHARGE INSTRUCTIONS

## 2024-09-14 NOTE — ED TRIAGE NOTES
Pt here with c/o lower back pain, states that she was doing PT stretches for her pelvis and developed a sharp pain in her lower back, feels like she may have overstretched and pulled something. Denies any numbness/tingling, or loss of bowel or bladder control. States she has been taking 3,000mg of tylenol a day for pelvic pain and took it without relief for the back pain.      Triage Assessment (Adult)       Row Name 09/13/24 8865          Triage Assessment    Airway WDL WDL        Respiratory WDL    Respiratory WDL WDL        Skin Circulation/Temperature WDL    Skin Circulation/Temperature WDL WDL        Cardiac WDL    Cardiac WDL WDL        Peripheral/Neurovascular WDL    Peripheral Neurovascular WDL WDL        Cognitive/Neuro/Behavioral WDL    Cognitive/Neuro/Behavioral WDL WDL

## 2024-10-26 ENCOUNTER — HEALTH MAINTENANCE LETTER (OUTPATIENT)
Age: 69
End: 2024-10-26

## 2024-11-02 ENCOUNTER — APPOINTMENT (OUTPATIENT)
Dept: MRI IMAGING | Facility: CLINIC | Age: 69
DRG: 542 | End: 2024-11-02
Attending: STUDENT IN AN ORGANIZED HEALTH CARE EDUCATION/TRAINING PROGRAM
Payer: COMMERCIAL

## 2024-11-02 ENCOUNTER — HOSPITAL ENCOUNTER (INPATIENT)
Facility: CLINIC | Age: 69
LOS: 8 days | Discharge: HOME HEALTH | DRG: 542 | End: 2024-11-10
Attending: STUDENT IN AN ORGANIZED HEALTH CARE EDUCATION/TRAINING PROGRAM | Admitting: INTERNAL MEDICINE
Payer: COMMERCIAL

## 2024-11-02 ENCOUNTER — APPOINTMENT (OUTPATIENT)
Dept: CT IMAGING | Facility: CLINIC | Age: 69
DRG: 542 | End: 2024-11-02
Attending: STUDENT IN AN ORGANIZED HEALTH CARE EDUCATION/TRAINING PROGRAM
Payer: COMMERCIAL

## 2024-11-02 DIAGNOSIS — C90.00 MULTIPLE MYELOMA NOT HAVING ACHIEVED REMISSION (H): ICD-10-CM

## 2024-11-02 DIAGNOSIS — Z96.642 S/P TOTAL LEFT HIP ARTHROPLASTY: Chronic | ICD-10-CM

## 2024-11-02 DIAGNOSIS — N20.0 KIDNEY STONE: ICD-10-CM

## 2024-11-02 DIAGNOSIS — N20.0 NEPHROLITHIASIS: ICD-10-CM

## 2024-11-02 DIAGNOSIS — J96.21 ACUTE AND CHRONIC RESPIRATORY FAILURE WITH HYPOXIA (H): ICD-10-CM

## 2024-11-02 DIAGNOSIS — M54.9 INTRACTABLE BACK PAIN: ICD-10-CM

## 2024-11-02 DIAGNOSIS — N18.31 STAGE 3A CHRONIC KIDNEY DISEASE (H): ICD-10-CM

## 2024-11-02 DIAGNOSIS — N13.2 HYDRONEPHROSIS WITH URINARY OBSTRUCTION DUE TO URETERAL CALCULUS: Primary | ICD-10-CM

## 2024-11-02 LAB
ALBUMIN SERPL BCG-MCNC: 4.6 G/DL (ref 3.5–5.2)
ALP SERPL-CCNC: 163 U/L (ref 40–150)
ALT SERPL W P-5'-P-CCNC: 54 U/L (ref 0–50)
ANION GAP SERPL CALCULATED.3IONS-SCNC: 16 MMOL/L (ref 7–15)
AST SERPL W P-5'-P-CCNC: 42 U/L (ref 0–45)
BASOPHILS # BLD AUTO: 0 10E3/UL (ref 0–0.2)
BASOPHILS NFR BLD AUTO: 0 %
BILIRUB DIRECT SERPL-MCNC: <0.2 MG/DL (ref 0–0.3)
BILIRUB SERPL-MCNC: 0.5 MG/DL
BUN SERPL-MCNC: 13.5 MG/DL (ref 8–23)
CALCIUM SERPL-MCNC: 10.2 MG/DL (ref 8.8–10.4)
CHLORIDE SERPL-SCNC: 101 MMOL/L (ref 98–107)
CREAT SERPL-MCNC: 0.77 MG/DL (ref 0.51–0.95)
EGFRCR SERPLBLD CKD-EPI 2021: 84 ML/MIN/1.73M2
EOSINOPHIL # BLD AUTO: 0.1 10E3/UL (ref 0–0.7)
EOSINOPHIL NFR BLD AUTO: 3 %
ERYTHROCYTE [DISTWIDTH] IN BLOOD BY AUTOMATED COUNT: 14.7 % (ref 10–15)
GLUCOSE SERPL-MCNC: 100 MG/DL (ref 70–99)
HCO3 SERPL-SCNC: 20 MMOL/L (ref 22–29)
HCT VFR BLD AUTO: 30.3 % (ref 35–47)
HGB BLD-MCNC: 11 G/DL (ref 11.7–15.7)
IMM GRANULOCYTES # BLD: 0.1 10E3/UL
IMM GRANULOCYTES NFR BLD: 2 %
LYMPHOCYTES # BLD AUTO: 0.8 10E3/UL (ref 0.8–5.3)
LYMPHOCYTES NFR BLD AUTO: 22 %
MCH RBC QN AUTO: 32.2 PG (ref 26.5–33)
MCHC RBC AUTO-ENTMCNC: 36.3 G/DL (ref 31.5–36.5)
MCV RBC AUTO: 89 FL (ref 78–100)
MONOCYTES # BLD AUTO: 0.3 10E3/UL (ref 0–1.3)
MONOCYTES NFR BLD AUTO: 10 %
NEUTROPHILS # BLD AUTO: 2.2 10E3/UL (ref 1.6–8.3)
NEUTROPHILS NFR BLD AUTO: 63 %
NRBC # BLD AUTO: 0 10E3/UL
NRBC BLD AUTO-RTO: 0 /100
PLATELET # BLD AUTO: 215 10E3/UL (ref 150–450)
POTASSIUM SERPL-SCNC: 3.8 MMOL/L (ref 3.4–5.3)
PROT SERPL-MCNC: 7.1 G/DL (ref 6.4–8.3)
RBC # BLD AUTO: 3.42 10E6/UL (ref 3.8–5.2)
SODIUM SERPL-SCNC: 137 MMOL/L (ref 135–145)
WBC # BLD AUTO: 3.4 10E3/UL (ref 4–11)

## 2024-11-02 PROCEDURE — 250N000011 HC RX IP 250 OP 636: Performed by: STUDENT IN AN ORGANIZED HEALTH CARE EDUCATION/TRAINING PROGRAM

## 2024-11-02 PROCEDURE — 99285 EMERGENCY DEPT VISIT HI MDM: CPT | Mod: 25

## 2024-11-02 PROCEDURE — 255N000002 HC RX 255 OP 636: Performed by: STUDENT IN AN ORGANIZED HEALTH CARE EDUCATION/TRAINING PROGRAM

## 2024-11-02 PROCEDURE — 72157 MRI CHEST SPINE W/O & W/DYE: CPT

## 2024-11-02 PROCEDURE — A9585 GADOBUTROL INJECTION: HCPCS | Performed by: STUDENT IN AN ORGANIZED HEALTH CARE EDUCATION/TRAINING PROGRAM

## 2024-11-02 PROCEDURE — 250N000011 HC RX IP 250 OP 636: Performed by: INTERNAL MEDICINE

## 2024-11-02 PROCEDURE — 85025 COMPLETE CBC W/AUTO DIFF WBC: CPT | Performed by: STUDENT IN AN ORGANIZED HEALTH CARE EDUCATION/TRAINING PROGRAM

## 2024-11-02 PROCEDURE — 72128 CT CHEST SPINE W/O DYE: CPT

## 2024-11-02 PROCEDURE — 250N000013 HC RX MED GY IP 250 OP 250 PS 637: Performed by: INTERNAL MEDICINE

## 2024-11-02 PROCEDURE — 36415 COLL VENOUS BLD VENIPUNCTURE: CPT | Performed by: STUDENT IN AN ORGANIZED HEALTH CARE EDUCATION/TRAINING PROGRAM

## 2024-11-02 PROCEDURE — 72158 MRI LUMBAR SPINE W/O & W/DYE: CPT

## 2024-11-02 PROCEDURE — 82248 BILIRUBIN DIRECT: CPT | Performed by: INTERNAL MEDICINE

## 2024-11-02 PROCEDURE — 80048 BASIC METABOLIC PNL TOTAL CA: CPT | Performed by: STUDENT IN AN ORGANIZED HEALTH CARE EDUCATION/TRAINING PROGRAM

## 2024-11-02 PROCEDURE — 99223 1ST HOSP IP/OBS HIGH 75: CPT | Performed by: INTERNAL MEDICINE

## 2024-11-02 PROCEDURE — 96376 TX/PRO/DX INJ SAME DRUG ADON: CPT

## 2024-11-02 PROCEDURE — 120N000001 HC R&B MED SURG/OB

## 2024-11-02 PROCEDURE — 96374 THER/PROPH/DIAG INJ IV PUSH: CPT

## 2024-11-02 PROCEDURE — 258N000003 HC RX IP 258 OP 636: Performed by: INTERNAL MEDICINE

## 2024-11-02 RX ORDER — ONDANSETRON 2 MG/ML
4 INJECTION INTRAMUSCULAR; INTRAVENOUS EVERY 6 HOURS PRN
Status: DISCONTINUED | OUTPATIENT
Start: 2024-11-02 | End: 2024-11-10 | Stop reason: HOSPADM

## 2024-11-02 RX ORDER — SENNOSIDES 8.6 MG
1 TABLET ORAL AT BEDTIME
Status: DISCONTINUED | OUTPATIENT
Start: 2024-11-02 | End: 2024-11-10 | Stop reason: HOSPADM

## 2024-11-02 RX ORDER — KETOROLAC TROMETHAMINE 15 MG/ML
15 INJECTION, SOLUTION INTRAMUSCULAR; INTRAVENOUS EVERY 6 HOURS PRN
Status: ACTIVE | OUTPATIENT
Start: 2024-11-02 | End: 2024-11-07

## 2024-11-02 RX ORDER — KETOROLAC TROMETHAMINE 15 MG/ML
15 INJECTION, SOLUTION INTRAMUSCULAR; INTRAVENOUS ONCE
Status: COMPLETED | OUTPATIENT
Start: 2024-11-02 | End: 2024-11-02

## 2024-11-02 RX ORDER — OXYCODONE HYDROCHLORIDE 5 MG/1
10 TABLET ORAL EVERY 4 HOURS
Status: ON HOLD | COMMUNITY
End: 2024-11-09

## 2024-11-02 RX ORDER — AMOXICILLIN 250 MG
1 CAPSULE ORAL 2 TIMES DAILY PRN
Status: DISCONTINUED | OUTPATIENT
Start: 2024-11-02 | End: 2024-11-04

## 2024-11-02 RX ORDER — ONDANSETRON 4 MG/1
4 TABLET, ORALLY DISINTEGRATING ORAL EVERY 6 HOURS PRN
Status: DISCONTINUED | OUTPATIENT
Start: 2024-11-02 | End: 2024-11-10 | Stop reason: HOSPADM

## 2024-11-02 RX ORDER — HYDROMORPHONE HYDROCHLORIDE 1 MG/ML
0.5 INJECTION, SOLUTION INTRAMUSCULAR; INTRAVENOUS; SUBCUTANEOUS
Status: DISCONTINUED | OUTPATIENT
Start: 2024-11-02 | End: 2024-11-04

## 2024-11-02 RX ORDER — GADOBUTROL 604.72 MG/ML
9 INJECTION INTRAVENOUS ONCE
Status: COMPLETED | OUTPATIENT
Start: 2024-11-02 | End: 2024-11-02

## 2024-11-02 RX ORDER — POLYETHYLENE GLYCOL 3350 17 G/17G
17 POWDER, FOR SOLUTION ORAL AT BEDTIME
Status: DISCONTINUED | OUTPATIENT
Start: 2024-11-02 | End: 2024-11-04

## 2024-11-02 RX ORDER — OXYCODONE HYDROCHLORIDE 5 MG/1
5 TABLET ORAL EVERY 4 HOURS PRN
Status: DISCONTINUED | OUTPATIENT
Start: 2024-11-02 | End: 2024-11-04

## 2024-11-02 RX ORDER — ACETAMINOPHEN 325 MG/1
975 TABLET ORAL EVERY 6 HOURS
Status: DISCONTINUED | OUTPATIENT
Start: 2024-11-02 | End: 2024-11-10 | Stop reason: HOSPADM

## 2024-11-02 RX ORDER — ENOXAPARIN SODIUM 100 MG/ML
40 INJECTION SUBCUTANEOUS EVERY 24 HOURS
Status: DISCONTINUED | OUTPATIENT
Start: 2024-11-02 | End: 2024-11-10 | Stop reason: HOSPADM

## 2024-11-02 RX ORDER — ONDANSETRON 2 MG/ML
4 INJECTION INTRAMUSCULAR; INTRAVENOUS EVERY 30 MIN PRN
Status: DISCONTINUED | OUTPATIENT
Start: 2024-11-02 | End: 2024-11-04

## 2024-11-02 RX ORDER — DEXTROSE MONOHYDRATE, SODIUM CHLORIDE, AND POTASSIUM CHLORIDE 50; 1.49; 4.5 G/1000ML; G/1000ML; G/1000ML
INJECTION, SOLUTION INTRAVENOUS CONTINUOUS
Status: ACTIVE | OUTPATIENT
Start: 2024-11-02 | End: 2024-11-03

## 2024-11-02 RX ORDER — OXYCODONE HYDROCHLORIDE 10 MG/1
10 TABLET ORAL EVERY 4 HOURS PRN
Status: DISCONTINUED | OUTPATIENT
Start: 2024-11-02 | End: 2024-11-04

## 2024-11-02 RX ORDER — ACETAMINOPHEN 500 MG
1000 TABLET ORAL 4 TIMES DAILY
COMMUNITY

## 2024-11-02 RX ORDER — LORAZEPAM 2 MG/ML
0.5 INJECTION INTRAMUSCULAR
Status: DISCONTINUED | OUTPATIENT
Start: 2024-11-02 | End: 2024-11-07

## 2024-11-02 RX ORDER — POLYETHYLENE GLYCOL 3350 17 G/17G
17 POWDER, FOR SOLUTION ORAL AT BEDTIME
COMMUNITY

## 2024-11-02 RX ORDER — HYDROMORPHONE HYDROCHLORIDE 1 MG/ML
0.3 INJECTION, SOLUTION INTRAMUSCULAR; INTRAVENOUS; SUBCUTANEOUS
Status: DISCONTINUED | OUTPATIENT
Start: 2024-11-02 | End: 2024-11-04

## 2024-11-02 RX ORDER — AMLODIPINE BESYLATE 5 MG/1
5 TABLET ORAL DAILY
Status: DISCONTINUED | OUTPATIENT
Start: 2024-11-03 | End: 2024-11-10 | Stop reason: HOSPADM

## 2024-11-02 RX ORDER — HYDROMORPHONE HYDROCHLORIDE 1 MG/ML
0.5 INJECTION, SOLUTION INTRAMUSCULAR; INTRAVENOUS; SUBCUTANEOUS EVERY 30 MIN PRN
Status: DISCONTINUED | OUTPATIENT
Start: 2024-11-02 | End: 2024-11-03

## 2024-11-02 RX ORDER — PANTOPRAZOLE SODIUM 40 MG/1
40 TABLET, DELAYED RELEASE ORAL
Status: DISCONTINUED | OUTPATIENT
Start: 2024-11-03 | End: 2024-11-10 | Stop reason: HOSPADM

## 2024-11-02 RX ORDER — AMOXICILLIN 250 MG
1 CAPSULE ORAL 2 TIMES DAILY
Status: DISCONTINUED | OUTPATIENT
Start: 2024-11-02 | End: 2024-11-02

## 2024-11-02 RX ORDER — LIDOCAINE 40 MG/G
CREAM TOPICAL
Status: DISCONTINUED | OUTPATIENT
Start: 2024-11-02 | End: 2024-11-10 | Stop reason: HOSPADM

## 2024-11-02 RX ORDER — HYDROMORPHONE HYDROCHLORIDE 1 MG/ML
0.3 INJECTION, SOLUTION INTRAMUSCULAR; INTRAVENOUS; SUBCUTANEOUS ONCE
Status: COMPLETED | OUTPATIENT
Start: 2024-11-02 | End: 2024-11-02

## 2024-11-02 RX ORDER — OXYCODONE HCL 10 MG/1
20 TABLET, FILM COATED, EXTENDED RELEASE ORAL EVERY 12 HOURS
Status: DISCONTINUED | OUTPATIENT
Start: 2024-11-02 | End: 2024-11-07

## 2024-11-02 RX ORDER — AMOXICILLIN 250 MG
2 CAPSULE ORAL 2 TIMES DAILY PRN
Status: DISCONTINUED | OUTPATIENT
Start: 2024-11-02 | End: 2024-11-04

## 2024-11-02 RX ORDER — SENNOSIDES A AND B 8.6 MG/1
8.6 TABLET, FILM COATED ORAL 2 TIMES DAILY PRN
COMMUNITY
Start: 2024-10-16

## 2024-11-02 RX ORDER — LORAZEPAM 2 MG/ML
1 INJECTION INTRAMUSCULAR
Status: COMPLETED | OUTPATIENT
Start: 2024-11-02 | End: 2024-11-02

## 2024-11-02 RX ADMIN — HYDROMORPHONE HYDROCHLORIDE 0.5 MG: 1 INJECTION, SOLUTION INTRAMUSCULAR; INTRAVENOUS; SUBCUTANEOUS at 15:17

## 2024-11-02 RX ADMIN — KETOROLAC TROMETHAMINE 15 MG: 15 INJECTION, SOLUTION INTRAMUSCULAR; INTRAVENOUS at 17:44

## 2024-11-02 RX ADMIN — SENNOSIDES 1 TABLET: 8.6 TABLET, FILM COATED ORAL at 21:36

## 2024-11-02 RX ADMIN — HYDROMORPHONE HYDROCHLORIDE 0.5 MG: 1 INJECTION, SOLUTION INTRAMUSCULAR; INTRAVENOUS; SUBCUTANEOUS at 14:23

## 2024-11-02 RX ADMIN — OXYCODONE HYDROCHLORIDE 20 MG: 10 TABLET, FILM COATED, EXTENDED RELEASE ORAL at 20:15

## 2024-11-02 RX ADMIN — ENOXAPARIN SODIUM 40 MG: 40 INJECTION SUBCUTANEOUS at 20:16

## 2024-11-02 RX ADMIN — HYDROMORPHONE HYDROCHLORIDE 0.3 MG: 1 INJECTION, SOLUTION INTRAMUSCULAR; INTRAVENOUS; SUBCUTANEOUS at 18:02

## 2024-11-02 RX ADMIN — POTASSIUM CHLORIDE, DEXTROSE MONOHYDRATE AND SODIUM CHLORIDE: 150; 5; 450 INJECTION, SOLUTION INTRAVENOUS at 20:16

## 2024-11-02 RX ADMIN — GADOBUTROL 9 ML: 604.72 INJECTION INTRAVENOUS at 18:21

## 2024-11-02 RX ADMIN — ACETAMINOPHEN 975 MG: 325 TABLET, FILM COATED ORAL at 20:15

## 2024-11-02 RX ADMIN — POLYETHYLENE GLYCOL 3350 17 G: 17 POWDER, FOR SOLUTION ORAL at 21:35

## 2024-11-02 RX ADMIN — LORAZEPAM 1 MG: 2 INJECTION INTRAMUSCULAR; INTRAVENOUS at 17:39

## 2024-11-02 ASSESSMENT — ACTIVITIES OF DAILY LIVING (ADL)
ADLS_ACUITY_SCORE: 0

## 2024-11-02 ASSESSMENT — COLUMBIA-SUICIDE SEVERITY RATING SCALE - C-SSRS
6. HAVE YOU EVER DONE ANYTHING, STARTED TO DO ANYTHING, OR PREPARED TO DO ANYTHING TO END YOUR LIFE?: NO
2. HAVE YOU ACTUALLY HAD ANY THOUGHTS OF KILLING YOURSELF IN THE PAST MONTH?: NO
1. IN THE PAST MONTH, HAVE YOU WISHED YOU WERE DEAD OR WISHED YOU COULD GO TO SLEEP AND NOT WAKE UP?: NO

## 2024-11-02 NOTE — PHARMACY-ADMISSION MEDICATION HISTORY
Pharmacy Intern Admission Medication History    Admission medication history is complete. The information provided in this note is only as accurate as the sources available at the time of the update.    Information Source(s): Patient and CareEverywhere/SureScripts via in-person    Pertinent Information: None    Changes made to PTA medication list:  Added: Senna, MIRALAX  Deleted: amoxicillin, Vit D, Q10, Vit B12, diclofenac, fish oil, Mg, tamsulosin  Changed:   Tylenol 500 q 6 hrs PRN->QID  Oxy 5 mg q 6hrs PRN->10 mg q 4 hrs    Allergies reviewed with patient and updates made in EHR: yes    Medication History Completed By: Mihir Baird 11/2/2024 5:28 PM    PTA Med List   Medication Sig Last Dose/Taking    acetaminophen (TYLENOL) 500 MG tablet Take 1,000 mg by mouth 4 times daily. 11/2/2024 Morning    amLODIPine (NORVASC) 5 MG tablet Take 1 tablet (5 mg) by mouth daily 11/2/2024 Morning    losartan (COZAAR) 100 MG tablet Take 100 mg by mouth daily. 11/2/2024 Morning    oxyCODONE (ROXICODONE) 5 MG tablet Take 10 mg by mouth every 4 hours. 11/2/2024 Noon    polyethylene glycol (MIRALAX) 17 GM/Dose powder Take 17 g by mouth at bedtime. 11/1/2024 Bedtime    senna (SENOKOT) 8.6 MG tablet Take 8.6 mg by mouth 2 times daily as needed for constipation. 11/1/2024

## 2024-11-02 NOTE — ED NOTES
[facility]  ED Nurse Handoff Report    ED Chief complaint: Back Pain  . ED Diagnosis:   Final diagnoses:   Intractable back pain       Allergies:   Allergies   Allergen Reactions    Gluten Meal Other (See Comments)     abd bloating, gas    Casein Other (See Comments)     Sneezing, watery eyes       Code Status: Full Code    Activity level - Baseline/Home:  independent.  Activity Level - Current:   assist of 1.   Lift room needed: No.   Bariatric: No   Needed: No   Isolation: No.   Infection: Not Applicable.     Respiratory status: Room air    Vital Signs (within 30 minutes):   Vitals:    11/02/24 1445 11/02/24 1516 11/02/24 1530 11/02/24 1600   BP: (!) 170/80 (!) 166/79 (!) 159/71 (!) 151/84   Pulse: 108 107 103 106   Resp: 22      Temp:       TempSrc:       SpO2: 93% 93% 93% 93%   Weight:       Height:           Cardiac Rhythm:  ,      Pain level:    Patient confused: No.   Patient Falls Risk: arm band in place and patient and family education.   Elimination Status: Has voided     Patient Report - Initial Complaint: back pain.   Focused Assessment: Pt arrives to the ED via EMS from home due to mid back pain that she rates a 10/10. States recent diagnosis of multiple myeloma. Has been taking 10 mg oxycodone at home q 4 hrs. Last dose @ 0900. Pt denies numbness or tingling in arms/legs currently. States she will sometimes get tingling in her right leg. Denies loss of bowel or bladder. Pt given 50 mcg of fentanyl by EMS. Pt states the pain just intensified today, difficulty moving.     Abnormal Results:   Labs Ordered and Resulted from Time of ED Arrival to Time of ED Departure   BASIC METABOLIC PANEL - Abnormal       Result Value    Sodium 137      Potassium 3.8      Chloride 101      Carbon Dioxide (CO2) 20 (*)     Anion Gap 16 (*)     Urea Nitrogen 13.5      Creatinine 0.77      GFR Estimate 84      Calcium 10.2      Glucose 100 (*)    CBC WITH PLATELETS AND DIFFERENTIAL - Abnormal     WBC Count 3.4 (*)     RBC Count 3.42 (*)     Hemoglobin 11.0 (*)     Hematocrit 30.3 (*)     MCV 89      MCH 32.2      MCHC 36.3      RDW 14.7      Platelet Count 215      % Neutrophils 63      % Lymphocytes 22      % Monocytes 10      % Eosinophils 3      % Basophils 0      % Immature Granulocytes 2      NRBCs per 100 WBC 0      Absolute Neutrophils 2.2      Absolute Lymphocytes 0.8      Absolute Monocytes 0.3      Absolute Eosinophils 0.1      Absolute Basophils 0.0      Absolute Immature Granulocytes 0.1      Absolute NRBCs 0.0          CT Thoracic Spine w/o Contrast   Final Result   IMPRESSION:     1.  Multilevel compression fractures at T5, T6, T8, T10 and T12 which are age indeterminate, however the T6, T8, T10 and T12 fractures appear acute or subacute. This could be confirmed with a thoracic spine MRI.    2.  No other evidence of acute fracture or subluxation of the thoracic spine by CT imaging.   3.  Thoracic kyphosis.      MR Thoracic Spine w/o & w Contrast    (Results Pending)   MR Lumbar Spine w/o & w Contrast    (Results Pending)       Treatments provided: See MAR  Family Comments: family at bedside  OBS brochure/video discussed/provided to patient:  Yes  ED Medications:   Medications   HYDROmorphone (PF) (DILAUDID) injection 0.5 mg (0.5 mg Intravenous $Given 11/2/24 8071)   ondansetron (ZOFRAN) injection 4 mg (has no administration in time range)   LORazepam (ATIVAN) injection 1 mg (has no administration in time range)   LORazepam (ATIVAN) injection 0.5 mg (has no administration in time range)       Drips infusing:  No  For the majority of the shift this patient was Green.   Interventions performed were N/A.    Sepsis treatment initiated: No    Cares/treatment/interventions/medications to be completed following ED care: N/A    ED Nurse Name: Yael Jean RN  4:46 PM  RECEIVING UNIT ED HANDOFF REVIEW    Above ED Nurse Handoff Report was reviewed: Yes  Reviewed by: Luiza Lee RN on  November 2, 2024 at 6:39 PM   TUCKER Hawkins called the ED to inform them the note was read: Yes

## 2024-11-02 NOTE — H&P
[facility] History and Physical    Scarlet Chapa MRN# [id number]   Age: 69 year old YOB: 1955     Date of Admission:  11/2/2024    Home clinic: [facility]   Primary care provider: Daly Bedoya          Assessment and Plan:   Assessment:   Scarlet Chapa is a 69 year old woman who presented to the ED today due to intolerable back pain.  She is known to have back pain due to MM and reports that the pain intensified today and is severe despite intake of Oxycodone 10 mg po q 4 h.     PMH includes on-going definitive work up of MM. She underwent BMBx at [facility] on 10/31 and was already told that the dx is confirmed.  Other hx includes HTN.    DX:  Severe back pain related to known infiltrative MM of vertebral bodies. Multiple pathologic vertebral fractures. Narcotic tolerant.  HTN.  Control is difficult to assess due to severe pain.   Recent dx MM. Pt has a meting with her Oncology team on Tuesday to discuss potential  treatments.   Mild anemia.   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        Plan:   Admit to inpatient.   Pain regimen: Scheduled acetaminophen 975 mg q 6 hours. Start Oxycontin 20 mg q 12 hours. Continue to offer Oxycodone 10/5 mg every 4 h for severe/moderate pain.    Toradol while here 15 mg q 6 prn is ordered. Pt is somewhat reluctant to use this due to suspected underlying kidney disease.   Empirically will also give Protonix 40 mg while on NSAIDs.   NSG consulted from ED.  MRI pending at this time.   Will need SW/CC assistance for dispo planning. Pt lives alone and although she has family close by, they are all busy with a lot of other family issues. Her movement is very limited at this time, to the point that she has not been eating.   Will give 1 L LR bolus followed by a 1 liter D5 0.45 NS with 20 mEq KCL at 100 ml/h.  Will need PT/OT consultations.              Chief Complaint:     Intractable back pain.      History is obtained from the patient,  electronic health record, and emergency department physician     Ms. Chapa started to notice severe back pain early in October.  She went through a process the evaluations that were inconclusive.  She then went to [facility] orthopedics and underwent an MRI that indicated abnormal bone marrow signal in a T8 compression fracture.  Ultimately, on 10/16/2024, she was seen by her primary physician Dr. Bedoya who began workup for possible infiltrative disease.  Unfortunately, these returned positive with abnormalities suggestive of multiple myeloma.    The patient was then referred on to oncology at [facility] where she was seen by Malissa Matthews, nurse practitioner on 10/29.  Bone marrow biopsy was obtained on 10/31 and the diagnosis was confirmed.    Ms. Chapa indicates that she was given oxycodone 5 mg more than a week ago.  Since that time, she has found that 5 mg is not effective at all.  She has had some benefit with 10 mg every 4 hours but even with that, her pain is virtually intolerable.  She lives alone and despite pain medications is barely able to get out of bed.    The patient is seen today with her brother, Yusuf, at the bedside.  Although he is clearly involved in her life, both he and his older brother are extremely busy with other issues including the care of their elderly father.  The patient has a sister who apparently is planning to come out from [place] to help take care of the patient.    The patient describes weight loss of about 20 pounds.  She indicates she really has not been eating because it so difficult to get out of bed.  She also has only had a small amount of stool output which she thinks is because she is not eating very much.  It is not completely clear that she is drinking very well either.  She denies nausea and vomiting.  Evidently has had some night sweats.  She denies history of gastric ulcers.  She has been told previously that she has CKD stage IIIa for which reason she has  been instructed to avoid nonsteroidal anti-inflammatory medications.        Past Medical History:     Past Medical History:   Diagnosis Date    Arthritis     Chronic kidney disease, stage III (moderate) (H)     Hyperlipidemia     Hypertension     Insomnia     Obese              Past Surgical History:      Past Surgical History:   Procedure Laterality Date    ARTHROPLASTY HIP Left 8/4/2023    Procedure: LEFT POSTERIOR TOTAL HIP ARTHROPLASTY;  Surgeon: Nitin Carrero MD;  Location: [facility] Main OR    COLONOSCOPY N/A 11/1/2017    Procedure: COLONOSCOPY;  colonoscopy;  Surgeon: Haile Whitman MD;  Location:  GI    CYSTOSCOPY, RETROGRADES, INSERT STENT URETER(S), COMBINED Left 9/23/2023    Procedure: Cystoscopy with left retrograde pyelogram, left ureteral stent placement, intraoperative interpretation of fluoroscopic imaging;  Surgeon: Shreya Dangelo MD;  Location:  OR    LASER HOLMIUM LITHOTRIPSY URETER(S), INSERT STENT, COMBINED Left 10/6/2023    Procedure: Cystoscopy, left ureteral stent exchange, left retrograde pyelogram, interpretation of fluoroscopic images, left ureteroscopy with thulium laser lithotripsy and stone basketing;  Surgeon: Aiden Hunter MD;  Location:  OR    ORTHOPEDIC SURGERY Right 12/2015    Hip athroplasty             Social History:     Social History     Tobacco Use    Smoking status: Never    Smokeless tobacco: Never   Substance Use Topics    Alcohol use: Yes     Comment: rare             Family History:     Family History   Problem Relation Age of Onset    Colon Cancer No family hx of      Family history reviewed          Allergies:     Allergies   Allergen Reactions    Gluten Meal Other (See Comments)     abd bloating, gas    Casein Other (See Comments)     Sneezing, watery eyes             Medications:     Medications Prior to Admission   Medication Sig Dispense Refill Last Dose/Taking    acetaminophen (TYLENOL) 500 MG tablet Take 1,000 mg by mouth 4 times  daily.   11/2/2024 Morning    amLODIPine (NORVASC) 5 MG tablet Take 1 tablet (5 mg) by mouth daily   11/2/2024 Morning    losartan (COZAAR) 100 MG tablet Take 100 mg by mouth daily.   11/2/2024 Morning    oxyCODONE (ROXICODONE) 5 MG tablet Take 10 mg by mouth every 4 hours.   11/2/2024 Noon    polyethylene glycol (MIRALAX) 17 GM/Dose powder Take 17 g by mouth at bedtime.   11/1/2024 Bedtime    senna (SENOKOT) 8.6 MG tablet Take 8.6 mg by mouth 2 times daily as needed for constipation.   11/1/2024             Review of Systems:     A comprehensive review of systems was performed and found to be negative except as described in this note           Physical Exam:   Vitals were reviewed  Temp: 98.5  F (36.9  C) Temp src: Oral BP: (!) 151/84 Pulse: 106   Resp: 22 SpO2: 93 % O2 Device: None (Room air)    Constitutional: Awake, alert, cooperative, no apparent distress, and appears stated age.  She is lying on a gurney in the emergency department and not moving at all.  She sits up with a great deal of difficulty and then got up to the commode with assist but clearly in significant pain.  Eyes: Lids and lashes normal, extra ocular muscles intact, sclera clear, conjunctiva normal.  ENT: Normocephalic, without obvious abnormality, atraumatic.  Neck: Supple, symmetrical, trachea midline, no adenopathy, thyroid symmetric, not enlarged and no tenderness, skin normal.  Hematologic / Lymphatic: No cervical lymphadenopathy and no supraclavicular lymphadenopathy.  Back: Symmetric.  I did not palpate on the patient's back due to her complaint of intense pain.  There are no visible abnormalities other than some accentuation of the thoracic curvature.  Lungs: No increased work of breathing, good air exchange, clear to auscultation bilaterally, no crackles or wheezing.  Cardiovascular: Tachycardic after having gotten up to the bedside commode.  Regular rate and rhythm, normal S1 and S2, no S3 or S4, and no murmur noted.  Abdomen:  Decreased bowel sounds, soft, non-distended, non-tender, no masses palpated, no hepatosplenomegaly.  Musculoskeletal: No redness, warmth, or swelling of the joints. Tone is normal.  Neurologic: Awake, alert, oriented to name, place and time.  Cranial nerves II-XII are grossly intact.   Neuropsychiatric: Normal affect, mood, orientation, memory and insight.  Skin: No rashes, erythema, pallor, petechia or purpura.          Data:     Results for orders placed or performed during the hospital encounter of 11/02/24 (from the past 24 hours)   CBC with platelets differential    Narrative    The following orders were created for panel order CBC with platelets differential.  Procedure                               Abnormality         Status                     ---------                               -----------         ------                     CBC with platelets and d...[[id number]]  Abnormal            Final result                 Please view results for these tests on the individual orders.   Basic metabolic panel   Result Value Ref Range    Sodium 137 135 - 145 mmol/L    Potassium 3.8 3.4 - 5.3 mmol/L    Chloride 101 98 - 107 mmol/L    Carbon Dioxide (CO2) 20 (L) 22 - 29 mmol/L    Anion Gap 16 (H) 7 - 15 mmol/L    Urea Nitrogen 13.5 8.0 - 23.0 mg/dL    Creatinine 0.77 0.51 - 0.95 mg/dL    GFR Estimate 84 >60 mL/min/1.73m2    Calcium 10.2 8.8 - 10.4 mg/dL    Glucose 100 (H) 70 - 99 mg/dL   CBC with platelets and differential   Result Value Ref Range    WBC Count 3.4 (L) 4.0 - 11.0 10e3/uL    RBC Count 3.42 (L) 3.80 - 5.20 10e6/uL    Hemoglobin 11.0 (L) 11.7 - 15.7 g/dL    Hematocrit 30.3 (L) 35.0 - 47.0 %    MCV 89 78 - 100 fL    MCH 32.2 26.5 - 33.0 pg    MCHC 36.3 31.5 - 36.5 g/dL    RDW 14.7 10.0 - 15.0 %    Platelet Count 215 150 - 450 10e3/uL    % Neutrophils 63 %    % Lymphocytes 22 %    % Monocytes 10 %    % Eosinophils 3 %    % Basophils 0 %    % Immature Granulocytes 2 %    NRBCs per 100 WBC 0 <1 /100    Absolute  Neutrophils 2.2 1.6 - 8.3 10e3/uL    Absolute Lymphocytes 0.8 0.8 - 5.3 10e3/uL    Absolute Monocytes 0.3 0.0 - 1.3 10e3/uL    Absolute Eosinophils 0.1 0.0 - 0.7 10e3/uL    Absolute Basophils 0.0 0.0 - 0.2 10e3/uL    Absolute Immature Granulocytes 0.1 <=0.4 10e3/uL    Absolute NRBCs 0.0 10e3/uL   Hepatic panel   Result Value Ref Range    Protein Total 7.1 6.4 - 8.3 g/dL    Albumin 4.6 3.5 - 5.2 g/dL    Bilirubin Total 0.5 <=1.2 mg/dL    Alkaline Phosphatase 163 (H) 40 - 150 U/L    AST 42 0 - 45 U/L    ALT 54 (H) 0 - 50 U/L    Bilirubin Direct <0.20 0.00 - 0.30 mg/dL   CT Thoracic Spine w/o Contrast    Narrative    EXAM: CT THORACIC SPINE W/O CONTRAST  LOCATION: [facility]  DATE: 11/2/2024    INDICATION: hx compression fracture spine T8, worsening pain  COMPARISON: September 22, 2023  TECHNIQUE: Routine CT Thoracic Spine without IV contrast. Multiplanar reformats. Dose reduction techniques were used.     FINDINGS:  Multilevel compression fractures at T5, T6, T8, T10 and T12 which are age indeterminate, however the T6, T8, T10 and T12 fractures appear acute or subacute. This could be confirmed with a thoracic spine MRI. There is associated thoracic kyphosis. No   other evidence of acute fracture or subluxation of the thoracic spine by CT imaging. Multilevel degenerative disc disease of the thoracic spine with disc height loss, most pronounced at T7/T8-T10/T11.    No significant posterior disc bulge, spinal canal, or neural foraminal narrowing at any level within the thoracic spine.      Impression    IMPRESSION:    1.  Multilevel compression fractures at T5, T6, T8, T10 and T12 which are age indeterminate, however the T6, T8, T10 and T12 fractures appear acute or subacute. This could be confirmed with a thoracic spine MRI.   2.  No other evidence of acute fracture or subluxation of the thoracic spine by CT imaging.  3.  Thoracic kyphosis.      All cardiac studies reviewed by me.   All imaging  studies reviewed by me.      Attestation:  I have reviewed today's vital signs, notes, medications, labs and imaging.     Kelby Sidhu MD

## 2024-11-02 NOTE — PROGRESS NOTES
[facility]  Telephone Note    Contacted by Dr. Carmona at [facility] ED.     Navya presented with intractable back pain. Recent multiple myeloma diagnosis with know thoracic comp fx at t8.    Neuro exam per ED - Neuro intact with no radic or paresthesias.    Imaging as stated below -  1. Multilevel compression fractures at T5, T6, T8, T10 and T12 which are age indeterminate, however the T6, T8, T10 and T12 fractures appear acute or subacute. This could be confirmed with a thoracic spine MRI.     Multiple Thoracic fx noted on XR from September.    Plan:   - MRI T and L spine w/wo to rule out pathologic fx   -up with assist  -ok for diet  -no brace at this time    Chase Eller DNP  [facility]surgery  [address] [address]  Tel [phone]

## 2024-11-02 NOTE — ED TRIAGE NOTES
Pt arrives to the ED via EMS from home due to mid back pain that she rates a 10/10. States recent diagnosis of multiple myeloma. Has been taking 10 mg oxycodone at home q 4 hrs. Last dose @ 0900. Pt denies numbness or tingling in arms/legs currently. States she will sometimes get tingling in her right leg. Denies loss of bowel or bladder. Pt given 50 mcg of fentanyl by EMS. Pt states the pain just intensified today, difficulty moving.

## 2024-11-02 NOTE — ED NOTES
Bed: ED31  Expected date:   Expected time:   Means of arrival:   Comments:  A592. Back pain. Recent myeloma dx.

## 2024-11-02 NOTE — ED PROVIDER NOTES
"  Emergency Department Note      History of Present Illness     Chief Complaint   Back Pain      HPI   Scarlet Chapa is a 69 year old female with a history of hypertension, stage 3a CKD, multiple myeloma who presents to the ED via EMS for evaluation of back pain.  She was recently diagnosed with multiple myeloma, waiting to see oncology next week through [facility].    She has had no falls or recent injuries.  Is taking oxycodone 10 mg every 4 hours scheduled as well as 4 g of Tylenol daily.  He says he slept in a little bit she says she slept in this morning and was 2 hours late taking her scheduled dose of Tylenol and wonders whether this is what caused her pain.  At times she describes breathlessness which she attributes to worsening pain.  As long as she stays still she has no pain.  Denies fever, loss of bowel or bladder, numbness or weakness in her lower extremities.    Independent Historian   None    Review of External Notes   None    Past Medical History     Medical History and Problem List   Osteoporosis  Depression  HTN  Nephrolithiasis  Obesity  Hydronephrosis  HLD  CKD, stage 3a  Insomnia     Medications   Albuterol  Flexeril  Valium  Norco  Robaxin  Losartan  Amlodipine  Oxycodone     Surgical History   JAMARCUS (B)  Ureter stent placement  Laser lithotripsy    Physical Exam     Patient Vitals for the past 24 hrs:   BP Temp Temp src Pulse Resp SpO2 Height Weight   11/02/24 1600 (!) 151/84 -- -- 106 -- 93 % -- --   11/02/24 1530 (!) 159/71 -- -- 103 -- 93 % -- --   11/02/24 1516 (!) 166/79 -- -- 107 -- 93 % -- --   11/02/24 1445 (!) 170/80 -- -- 108 22 93 % -- --   11/02/24 1346 (!) 163/79 -- -- 107 -- -- -- --   11/02/24 1332 -- -- -- 105 20 94 % -- --   11/02/24 1330 (!) 174/73 -- -- 105 -- -- -- --   11/02/24 1325 (!) 193/81 98.5  F (36.9  C) Oral 105 20 95 % 1.626 m (5' 4\") 90.7 kg (200 lb)     Physical Exam  General: Awake, alert, in no acute distress   HEENT: Atraumatic   EOM normal   External ears " normal   Trachea midline  Neck: Supple, normal ROM  CV: Tachycardic, regular rhythm   No lower extremity edema  2+ radial and DP pulses  Shallow respirations  PULM: Breath sounds normal bilaterally  No wheezes or rales  ABD: Soft, non-tender, non-distended  Normal bowel sounds   No rebound or guarding   MSK: No gross deformities  Point tenderness over the T7-T10 midline spine.  No upper thoracic or lumbar tenderness to palpation.  NEURO: Alert, no focal deficits  Normal strength with dorsi and plantarflexion of the feet.  No sensory deficits.  Skin: Warm, dry and intact      Diagnostics     Lab Results   Labs Ordered and Resulted from Time of ED Arrival to Time of ED Departure   BASIC METABOLIC PANEL - Abnormal       Result Value    Sodium 137      Potassium 3.8      Chloride 101      Carbon Dioxide (CO2) 20 (*)     Anion Gap 16 (*)     Urea Nitrogen 13.5      Creatinine 0.77      GFR Estimate 84      Calcium 10.2      Glucose 100 (*)    CBC WITH PLATELETS AND DIFFERENTIAL - Abnormal    WBC Count 3.4 (*)     RBC Count 3.42 (*)     Hemoglobin 11.0 (*)     Hematocrit 30.3 (*)     MCV 89      MCH 32.2      MCHC 36.3      RDW 14.7      Platelet Count 215      % Neutrophils 63      % Lymphocytes 22      % Monocytes 10      % Eosinophils 3      % Basophils 0      % Immature Granulocytes 2      NRBCs per 100 WBC 0      Absolute Neutrophils 2.2      Absolute Lymphocytes 0.8      Absolute Monocytes 0.3      Absolute Eosinophils 0.1      Absolute Basophils 0.0      Absolute Immature Granulocytes 0.1      Absolute NRBCs 0.0     HEPATIC FUNCTION PANEL - Abnormal    Protein Total 7.1      Albumin 4.6      Bilirubin Total 0.5      Alkaline Phosphatase 163 (*)     AST 42      ALT 54 (*)     Bilirubin Direct <0.20         Imaging   CT Thoracic Spine w/o Contrast   Final Result   IMPRESSION:     1.  Multilevel compression fractures at T5, T6, T8, T10 and T12 which are age indeterminate, however the T6, T8, T10 and T12 fractures  appear acute or subacute. This could be confirmed with a thoracic spine MRI.    2.  No other evidence of acute fracture or subluxation of the thoracic spine by CT imaging.   3.  Thoracic kyphosis.      MR Thoracic Spine w/o & w Contrast    (Results Pending)   MR Lumbar Spine w/o & w Contrast    (Results Pending)       Independent Interpretation   None    ED Course      Medications Administered   Medications   HYDROmorphone (PF) (DILAUDID) injection 0.5 mg (0.5 mg Intravenous $Given 11/2/24 3667)   ondansetron (ZOFRAN) injection 4 mg (has no administration in time range)   LORazepam (ATIVAN) injection 0.5 mg (has no administration in time range)   amLODIPine (NORVASC) tablet 5 mg (has no administration in time range)   lidocaine 1 % 0.1-1 mL (has no administration in time range)   lidocaine (LMX4) cream (has no administration in time range)   sodium chloride (PF) 0.9% PF flush 3 mL (has no administration in time range)   sodium chloride (PF) 0.9% PF flush 3 mL (has no administration in time range)   senna-docusate (SENOKOT-S/PERICOLACE) 8.6-50 MG per tablet 1 tablet (has no administration in time range)     Or   senna-docusate (SENOKOT-S/PERICOLACE) 8.6-50 MG per tablet 2 tablet (has no administration in time range)   acetaminophen (TYLENOL) tablet 975 mg (has no administration in time range)   oxyCODONE IR (ROXICODONE) tablet 10 mg (has no administration in time range)   oxyCODONE (ROXICODONE) tablet 5 mg (has no administration in time range)   oxyCODONE (oxyCONTIN) 12 hr tablet 20 mg (has no administration in time range)   enoxaparin ANTICOAGULANT (LOVENOX) injection 40 mg (has no administration in time range)   HYDROmorphone (PF) (DILAUDID) injection 0.3 mg (has no administration in time range)   HYDROmorphone (PF) (DILAUDID) injection 0.5 mg (has no administration in time range)   senna-docusate (SENOKOT-S/PERICOLACE) 8.6-50 MG per tablet 1 tablet (has no administration in time range)   ondansetron (ZOFRAN ODT)  ODT tab 4 mg (has no administration in time range)     Or   ondansetron (ZOFRAN) injection 4 mg (has no administration in time range)   pantoprazole (PROTONIX) EC tablet 40 mg (has no administration in time range)   ketorolac (TORADOL) injection 15 mg (has no administration in time range)   lactated ringers BOLUS 1,000 mL (has no administration in time range)   dextrose 5% and 0.45% NaCl + KCl 20 mEq/L infusion (has no administration in time range)   polyethylene glycol (MIRALAX) Packet 17 g (has no administration in time range)   senna (SENOKOT) tablet 1 tablet (has no administration in time range)   LORazepam (ATIVAN) injection 1 mg (1 mg Intravenous $Given 11/2/24 1739)   ketorolac (TORADOL) injection 15 mg (15 mg Intravenous $Given 11/2/24 1744)   HYDROmorphone (PF) (DILAUDID) injection 0.3 mg (0.3 mg Intravenous $Given 11/2/24 1802)   gadobutrol (GADAVIST) injection 9 mL (9 mLs Intravenous $Given 11/2/24 1821)       Procedures   Procedures     Discussion of Management   Admitting Hospitalist, Dr. Sidhu  Surgery, Chase Eller    ED Course   ED Course as of 11/02/24 1845   Sat Nov 02, 2024   1645 Spoke to Dr. Sidhu who accepts admission   1645 Spoke with Chase Eller DNP neurosurgery team is recommending MRI T and L-spine with and without contrast.       Additional Documentation  None    Medical Decision Making / Diagnosis     CMS Diagnoses: None    MIPS       None    MDM   Scarlet Chapa is a 69 year old female who presents with intractable low back pain despite significant opioid regimen at home.  She has recent diagnosis of multiple myeloma, concern for pathologic fracture at T8 based on outpatient MRI.    Her distal neuroexam is reassuring though she does have midline tenderness localizing around T8 vertebra.  There are some inconsistent reports of her multiple back imaging over the past couple of months.  She had an x-ray which did report some compression fractures at various levels, MRI 1 month ago only  commented on a compression fracture at T8 (acute) and L2 (chronic).     I got a CT scan of her back today which showed multiple areas of compression fracture.  I spoke with neurosurgery as above.  They are recommending MRI T and L-spine with and without contrast given history of malignancy.  Patient has significant claustrophobia, will order Ativan as needed for imaging and attempt MRIs.  No indication for spinal precautions or bracing at this time, neurosurgery are happy to consult while in hospital.    Patient will require admission for intractable back pain in the setting of likely malignancy, question of whether or not pathologic fractures.  Spoke with the above hospitalist who kindly accepts.    Disposition   The patient was admitted to the hospital.     Diagnosis     ICD-10-CM    1. Intractable back pain  M54.9              Scribe Disclosure:  I, Blanca Mendoza, am serving as a scribe at 1:26 PM on 11/2/2024 to document services personally performed by Yesenia Elliott DO based on my observations and the provider's statements to me.        Yesenia Elliott DO  11/02/24 2716

## 2024-11-03 LAB
ANION GAP SERPL CALCULATED.3IONS-SCNC: 13 MMOL/L (ref 7–15)
BUN SERPL-MCNC: 14.5 MG/DL (ref 8–23)
CALCIUM SERPL-MCNC: 9.7 MG/DL (ref 8.8–10.4)
CHLORIDE SERPL-SCNC: 102 MMOL/L (ref 98–107)
CREAT SERPL-MCNC: 0.84 MG/DL (ref 0.51–0.95)
EGFRCR SERPLBLD CKD-EPI 2021: 76 ML/MIN/1.73M2
ERYTHROCYTE [DISTWIDTH] IN BLOOD BY AUTOMATED COUNT: 15.2 % (ref 10–15)
GLUCOSE SERPL-MCNC: 94 MG/DL (ref 70–99)
HCO3 SERPL-SCNC: 22 MMOL/L (ref 22–29)
HCT VFR BLD AUTO: 30.3 % (ref 35–47)
HGB BLD-MCNC: 10.3 G/DL (ref 11.7–15.7)
MCH RBC QN AUTO: 31.2 PG (ref 26.5–33)
MCHC RBC AUTO-ENTMCNC: 34 G/DL (ref 31.5–36.5)
MCV RBC AUTO: 92 FL (ref 78–100)
PLATELET # BLD AUTO: 189 10E3/UL (ref 150–450)
POTASSIUM SERPL-SCNC: 4.2 MMOL/L (ref 3.4–5.3)
RBC # BLD AUTO: 3.3 10E6/UL (ref 3.8–5.2)
SODIUM SERPL-SCNC: 137 MMOL/L (ref 135–145)
WBC # BLD AUTO: 3.7 10E3/UL (ref 4–11)

## 2024-11-03 PROCEDURE — 99232 SBSQ HOSP IP/OBS MODERATE 35: CPT | Performed by: STUDENT IN AN ORGANIZED HEALTH CARE EDUCATION/TRAINING PROGRAM

## 2024-11-03 PROCEDURE — 99223 1ST HOSP IP/OBS HIGH 75: CPT | Performed by: NURSE PRACTITIONER

## 2024-11-03 PROCEDURE — 250N000013 HC RX MED GY IP 250 OP 250 PS 637: Performed by: INTERNAL MEDICINE

## 2024-11-03 PROCEDURE — 82947 ASSAY GLUCOSE BLOOD QUANT: CPT | Performed by: INTERNAL MEDICINE

## 2024-11-03 PROCEDURE — 36415 COLL VENOUS BLD VENIPUNCTURE: CPT | Performed by: INTERNAL MEDICINE

## 2024-11-03 PROCEDURE — 85014 HEMATOCRIT: CPT | Performed by: INTERNAL MEDICINE

## 2024-11-03 PROCEDURE — 250N000011 HC RX IP 250 OP 636: Performed by: INTERNAL MEDICINE

## 2024-11-03 PROCEDURE — 120N000001 HC R&B MED SURG/OB

## 2024-11-03 PROCEDURE — 250N000013 HC RX MED GY IP 250 OP 250 PS 637: Performed by: STUDENT IN AN ORGANIZED HEALTH CARE EDUCATION/TRAINING PROGRAM

## 2024-11-03 RX ORDER — LOSARTAN POTASSIUM 100 MG/1
100 TABLET ORAL DAILY
Status: DISCONTINUED | OUTPATIENT
Start: 2024-11-03 | End: 2024-11-10 | Stop reason: HOSPADM

## 2024-11-03 RX ADMIN — HYDROMORPHONE HYDROCHLORIDE 0.5 MG: 1 INJECTION, SOLUTION INTRAMUSCULAR; INTRAVENOUS; SUBCUTANEOUS at 17:06

## 2024-11-03 RX ADMIN — OXYCODONE HYDROCHLORIDE 10 MG: 10 TABLET ORAL at 19:49

## 2024-11-03 RX ADMIN — AMLODIPINE BESYLATE 5 MG: 5 TABLET ORAL at 08:20

## 2024-11-03 RX ADMIN — LOSARTAN POTASSIUM 100 MG: 100 TABLET, FILM COATED ORAL at 17:05

## 2024-11-03 RX ADMIN — SENNOSIDES 1 TABLET: 8.6 TABLET, FILM COATED ORAL at 22:08

## 2024-11-03 RX ADMIN — ENOXAPARIN SODIUM 40 MG: 40 INJECTION SUBCUTANEOUS at 19:50

## 2024-11-03 RX ADMIN — OXYCODONE HYDROCHLORIDE 5 MG: 5 TABLET ORAL at 00:26

## 2024-11-03 RX ADMIN — PANTOPRAZOLE SODIUM 40 MG: 40 TABLET, DELAYED RELEASE ORAL at 05:56

## 2024-11-03 RX ADMIN — ACETAMINOPHEN 975 MG: 325 TABLET, FILM COATED ORAL at 14:01

## 2024-11-03 RX ADMIN — POLYETHYLENE GLYCOL 3350 17 G: 17 POWDER, FOR SOLUTION ORAL at 22:08

## 2024-11-03 RX ADMIN — OXYCODONE HYDROCHLORIDE 20 MG: 10 TABLET, FILM COATED, EXTENDED RELEASE ORAL at 18:21

## 2024-11-03 RX ADMIN — ACETAMINOPHEN 975 MG: 325 TABLET, FILM COATED ORAL at 02:47

## 2024-11-03 RX ADMIN — ACETAMINOPHEN 975 MG: 325 TABLET, FILM COATED ORAL at 08:19

## 2024-11-03 RX ADMIN — OXYCODONE HYDROCHLORIDE 20 MG: 10 TABLET, FILM COATED, EXTENDED RELEASE ORAL at 05:56

## 2024-11-03 RX ADMIN — ACETAMINOPHEN 975 MG: 325 TABLET, FILM COATED ORAL at 19:49

## 2024-11-03 RX ADMIN — OXYCODONE HYDROCHLORIDE 10 MG: 10 TABLET ORAL at 12:23

## 2024-11-03 ASSESSMENT — ACTIVITIES OF DAILY LIVING (ADL)
ADLS_ACUITY_SCORE: 0
DEPENDENT_IADLS:: INDEPENDENT
ADLS_ACUITY_SCORE: 0
ADLS_ACUITY_SCORE: 0

## 2024-11-03 NOTE — PLAN OF CARE
"Goal Outcome Evaluation:      Plan of Care Reviewed With: patient      Pt A&O, having back pain 6/10, but refused PRN pain meds, later pt took PRN oxycodone and IV Dilaudid, for a pain for mobility use one assist, gait belt, walker, ate 100% of her meal, voided adequately, CMS intact, makes needs known.    BP (!) 172/68 (BP Location: Left arm, Cuff Size: Adult Regular)   Pulse 100   Temp 98.3  F (36.8  C) (Temporal)   Resp 24   Ht 1.626 m (5' 4\")   Wt 90.7 kg (200 lb)   SpO2 92%   BMI 34.33 kg/m     Problem: Adult Inpatient Plan of Care  Goal: Plan of Care Review  Description: The Plan of Care Review/Shift note should be completed every shift.  The Outcome Evaluation is a brief statement about your assessment that the patient is improving, declining, or no change.  This information will be displayed automatically on your shift  note.  Outcome: Not Progressing  Flowsheets (Taken 11/3/2024 1133)  Outcome Evaluation: Pt A&O, having back pain, but refused PRN pain meds, B.P. was rosibel, education was done.  Plan of Care Reviewed With: patient  Goal: Patient-Specific Goal (Individualized)  Description: You can add care plan individualizations to a care plan. Examples of Individualization might be:  \"Parent requests to be called daily at 9am for status\", \"I have a hard time hearing out of my right ear\", or \"Do not touch me to wake me up as it startles  me\".  Outcome: Not Progressing  Goal: Absence of Hospital-Acquired Illness or Injury  Outcome: Not Progressing  Intervention: Identify and Manage Fall Risk  Recent Flowsheet Documentation  Taken 11/3/2024 1100 by Luiza Lee, RN  Safety Promotion/Fall Prevention:   supervised activity   safety round/check completed   room organization consistent   room door open   room near nurse's station   nonskid shoes/slippers when out of bed   mobility aid in reach   clutter free environment maintained   activity supervised  Intervention: Prevent Skin Injury  Recent " Flowsheet Documentation  Taken 11/3/2024 1100 by Luiza Lee RN  Body Position: position changed independently  Skin Protection: adhesive use limited  Taken 11/3/2024 0901 by Luiza Lee RN  Body Position: position changed independently  Intervention: Prevent and Manage VTE (Venous Thromboembolism) Risk  Recent Flowsheet Documentation  Taken 11/3/2024 1100 by Luiza Lee RN  VTE Prevention/Management: SCDs off (sequential compression devices)  Goal: Optimal Comfort and Wellbeing  Outcome: Not Progressing  Intervention: Monitor Pain and Promote Comfort  Recent Flowsheet Documentation  Taken 11/3/2024 0901 by Luiza Lee RN  Pain Management Interventions: declines  Taken 11/3/2024 0816 by Luiza Lee RN  Pain Management Interventions: declines  Goal: Readiness for Transition of Care  Outcome: Not Progressing  Flowsheets (Taken 11/3/2024 1133)  Anticipated Changes Related to Illness: inability to care for self  Transportation Anticipated: family or friend will provide  Concerns to be Addressed:   adjustment to diagnosis/illness   basic needs   decision making   developmental   medication  Barriers to Discharge: intractable back pain.  Intervention: Mutually Develop Transition Plan  Recent Flowsheet Documentation  Taken 11/3/2024 1133 by Luiza Lee RN  Anticipated Changes Related to Illness: inability to care for self  Transportation Anticipated: family or friend will provide  Concerns to be Addressed:   adjustment to diagnosis/illness   basic needs   decision making   developmental   medication     Problem: Fall Injury Risk  Goal: Absence of Fall and Fall-Related Injury  Outcome: Not Progressing  Intervention: Identify and Manage Contributors  Recent Flowsheet Documentation  Taken 11/3/2024 1100 by Luiza Lee RN  Medication Review/Management: medications reviewed  Intervention: Promote Injury-Free Environment  Recent Flowsheet Documentation  Taken 11/3/2024 1100  by Makhanov, Luiza V, RN  Safety Promotion/Fall Prevention:   supervised activity   safety round/check completed   room organization consistent   room door open   room near nurse's station   nonskid shoes/slippers when out of bed   mobility aid in reach   clutter free environment maintained   activity supervised     Problem: Pain Acute  Goal: Optimal Pain Control and Function  Outcome: Not Progressing  Intervention: Develop Pain Management Plan  Recent Flowsheet Documentation  Taken 11/3/2024 0901 by Luiza Lee RN  Pain Management Interventions: declines  Taken 11/3/2024 0816 by Luiza Lee RN  Pain Management Interventions: declines  Intervention: Prevent or Manage Pain  Recent Flowsheet Documentation  Taken 11/3/2024 1100 by Luiza Lee RN  Medication Review/Management: medications reviewed       Outcome Evaluation: Pt A&O, having back pain, but refused PRN pain meds, B.P. was rosibel, education was done.

## 2024-11-03 NOTE — PROGRESS NOTES
"[facility]    Medicine Progress Note - Hospitalist Service    Date of Admission:  11/2/2024    Assessment & Plan   69-year-old female with recently diagnosed multiple myeloma on bone marrow biopsy but has not been started on treatment yet.  She comes in with severe back pain with multiple pathological fractures T5, 8, 10 and 12 seen on MRI and CT with no cord abnormality.      Severe back pain due to pathological fractures and T5, 8, 10 and 12 and L2/3 vertebral bodies.  Neurosurgery consult appreciated, placed radiation oncology consult but no plan for neurosurgical intervention.  Recommended to consider bracing for comfort.  Started on scheduled Tylenol 975 mg 4 times daily, scheduled OxyContin 20 mg twice daily and as needed IV Dilaudid, as needed p.o. oxycodone and as needed IV ketorolac.  Pending PT consultation.    Multiple myeloma.  Recent diagnosis but has not been started on treatment yet.  Patient has a meeting scheduled with her oncology team coming Tuesday but will likely be still in the hospital.    Essential hypertension.  Resumed amlodipine and losartan.  Blood pressure still high, due to pain.    Anemia and mild leukopenia.  Likely secondary to multiple myeloma.          Diet: Gluten Free Diet    DVT Prophylaxis: Enoxaparin (Lovenox) SQ  Vargas Catheter: Not present  Lines: None     Cardiac Monitoring: None  Code Status: Full Code      Clinically Significant Risk Factors Present on Admission                   # Hypertension: Noted on problem list    # Anemia: based on hgb <11       # Obesity: Estimated body mass index is 34.33 kg/m  as calculated from the following:    Height as of this encounter: 1.626 m (5' 4\").    Weight as of this encounter: 90.7 kg (200 lb).              Disposition Plan     Medically Ready for Discharge: Anticipated in 2-4 Days             Luis Carpenter MD  Hospitalist Service  [facility]  Securely message with Shruthi (more " info)  Text page via [facility] Paging/Directory   ______________________________________________________________________    Interval History   Continues to have severe back pain and moving.    Physical Exam   Vital Signs: Temp: 98.3  F (36.8  C) Temp src: Temporal BP: (!) 172/68 Pulse: 100   Resp: 24 SpO2: 92 % O2 Device: None (Room air)    Weight: 200 lbs 0 oz    General Appearance: Alert awake and oriented x 3.  Lying in the bed and does not move much due to fear of pain.  Respiratory: Clear to auscultation  Cardiovascular: S1-S2 normal  GI: Soft and nontender  Skin: No rash  Other: No edema      Medical Decision Making       MANAGEMENT DISCUSSED with the following over the past 24 hours: Patient and RN       Data     I have personally reviewed the following data over the past 24 hrs:    3.7 (L)  \   10.3 (L)   / 189     137 102 14.5 /  94   4.2 22 0.84 \     ALT: N/A AST: N/A AP: N/A TBILI: N/A   ALB: N/A TOT PROTEIN: N/A LIPASE: N/A       Imaging results reviewed over the past 24 hrs:   Recent Results (from the past 24 hours)   MR Thoracic Spine w/o & w Contrast    Narrative    EXAM: MR THORACIC SPINE W/O and W CONTRAST, MR LUMBAR SPINE W/O and W CONTRAST  LOCATION: [facility]  DATE: 11/2/2024    INDICATION: concern for pathologic fractures, intractable back pain, newly diagnosed multiple myeloma  COMPARISON: Same day thoracic spine CT. Lumbar spine radiographs: 9/13/2024.  CONTRAST: 9 ml Gadavist  TECHNIQUE:   1) Routine Thoracic Spine MRI without and with IV contrast.  2) Routine Lumbar Spine MRI without and with IV contrast.    FINDINGS:    THORACIC SPINE:    Unchanged height loss associated with multiple thoracic vertebral body fractures from same day CT study including the following: T5 (approximately 25-30% height loss), T6 (50% height loss), T8 (75% height loss), T10 (50% height loss), and T12 (40% height   loss). There is a mild to moderate degree of patchy STIR hyperintense signal and  enhancement associated with fractures at T5, T8, T10, and T12. Very faint edema signal and enhancement is also seen throughout the T6 vertebral body with areas of   hypointense fracture line suggested. Minor bony retropulsion is seen associated with fractures at all the described levels, greatest and measuring 4 mm at T10 and T12.    Along the posterior aspect of the T10 vertebral body, there is asymmetric enhancing soft tissue, which appears to extend into the ventral epidural space with circumferential involvement at the T10-T11 level (series 10/images 6-11 and series 9/images   56-59). In combination with bony retropulsion of the T10 vertebral body, there is moderate central spinal canal stenosis. The abnormal enhancement extends towards the bilateral neural foramina at T10-T11.    Similar exaggeration of the mid thoracic kyphosis centered at T6. No substantial listhesis. No signal abnormality to suggest acute traumatic ligamentous injury in the thoracic spine.    Modic type II degenerative marrow changes are seen at multiple levels ventrally including T4-T5, T5-T6, T6-T7, and T7-T8.    Multilevel degenerative disc disease with disc height loss greatest and moderate at T8-T9. Multilevel facet arthropathy. Mild posterior disc bulging at T6-T7. No high-grade spinal canal or neural foraminal stenosis related to degenerative disease.    No cord signal abnormality. No pathologic cord enhancement.    Rounded 7 mm fat signal lesion involving the left aspect of the T3 vertebral body, favored to represent a small hemangioma.    No extraspinal abnormality.    LUMBAR SPINE:   Transitional lumbosacral anatomy with sacralized L5 segment. Fracture of the L2 superior endplate with approximately 30% height loss and mild patchy STIR hyperintense signal abnormality and enhancement throughout. There also appears to be a fracture of   the L3 superior endplate with minimal height loss and mild patchy STIR hyperintense signal  abnormality and enhancement throughout the vertebral body. Approximately 4 mm bony retropulsion at L2 and L3. Abnormal enhancement is seen throughout the posterior   elements of all lumbar levels, concerning for myelomatous involvement. Minor grade 1 anterolisthesis of L3 on L4. Normal distal spinal cord and cauda equina with conus medullaris at L1. No pathologic contrast enhancement involving the distal spinal cord   or epidural spaces of the lumbar spine. Markedly heterogenous appearance of the sacrum/bony pelvis with patchy areas of irregular nodular enhancement.    T12-L1: Normal disc height and signal. Small left paracentral disc protrusion with annular fissuring. Mild lateral facet hypertrophy. No substantial spinal canal stenosis. Mild bilateral neural foraminal stenosis.     L1-L2: Expansion of the disc space related to L2 superior endplate fracture. No disc herniation. Mild bilateral facet hypertrophy. Mild spinal canal stenosis related to L2 vertebral body bony retropulsion. No substantial neural foraminal stenosis.    L2-L3: Normal disc height and signal. Minimal posterior disc bulging. Moderate lateral facet hypertrophy. Slight ligament flavum thickening. No substantial spinal canal stenosis. No substantial neural foraminal stenosis.     L3-L4: Normal disc height and signal. Minor anterolisthesis. Moderate to advanced lateral facet hypertrophy. Moderate sized facet joint effusions bilaterally without periarticular edema. Ligamentum flavum thickening. No substantial spinal canal stenosis.   Mild bilateral neural foraminal stenosis.    L4-L5: Normal disc height and signal. Small left foraminal disc protrusion. Moderate bilateral facet hypertrophy. No substantial spinal canal stenosis. Moderate left neural foraminal stenosis. No substantial right neural foraminal stenosis.    L5-S1: Rudimentary disc. No substantial spinal canal or neural foraminal stenosis.      Impression    IMPRESSION:    THORACIC SPINE  MRI:  1.  Unchanged height loss associated with vertebral body fractures at T5, T6, T8, T10, and T12 as seen on prior same-day CT study and further detailed above. Mild to moderate patchy STIR hyperintense signal abnormality and associated enhancement are seen   at these levels, particularly at T5, T8, T10, and T12, suggesting subacute processes. Given patient's reported history of multiple myeloma, there is a high concern for pathologic fractures.  2.  Abnormal asymmetric enhancement along the posterior aspect of the T10 vertebral body, which appears to extend into the ventral epidural space with circumferential involvement at the T10-T11 level, concerning for soft tissue component of T10 vertebral   body neoplasm. In combination with bony retropulsion of the T10 vertebral body, there is moderate spinal canal stenosis. Abnormal enhancement also extends towards the bilateral T10-T11 neural foramina. Advise close attention on future follow-up imaging.  3.  No cord signal abnormality or pathologic cord enhancement.  4.  Multilevel thoracic spondylosis without associated high-grade spinal canal or neural foraminal stenosis.    LUMBAR SPINE MRI:  1.  Transitional lumbosacral anatomy with sacralized L5 segment.  2.  Findings concerning for extensive myelomatous involvement throughout the lumbar spine and sacrum/bony pelvis as detailed above.  3.  Suspected pathologic fractures involving the L2 and L3 vertebral bodies with mild patchy STIR hyperintense signal abnormality and enhancement throughout these vertebral bodies, likely representing a subacute process.  4.  Multilevel lumbar spondylosis.  5.  No high-grade spinal canal or neural foraminal stenosis.  6.  No pathologic contrast enhancement involving the distal spinal cord or epidural spaces of the lumbar spine.   MR Lumbar Spine w/o & w Contrast    Narrative    EXAM: MR THORACIC SPINE W/O and W CONTRAST, MR LUMBAR SPINE W/O and W CONTRAST  LOCATION: [facility]  [facility]  DATE: 11/2/2024    INDICATION: concern for pathologic fractures, intractable back pain, newly diagnosed multiple myeloma  COMPARISON: Same day thoracic spine CT. Lumbar spine radiographs: 9/13/2024.  CONTRAST: 9 ml Gadavist  TECHNIQUE:   1) Routine Thoracic Spine MRI without and with IV contrast.  2) Routine Lumbar Spine MRI without and with IV contrast.    FINDINGS:    THORACIC SPINE:    Unchanged height loss associated with multiple thoracic vertebral body fractures from same day CT study including the following: T5 (approximately 25-30% height loss), T6 (50% height loss), T8 (75% height loss), T10 (50% height loss), and T12 (40% height   loss). There is a mild to moderate degree of patchy STIR hyperintense signal and enhancement associated with fractures at T5, T8, T10, and T12. Very faint edema signal and enhancement is also seen throughout the T6 vertebral body with areas of   hypointense fracture line suggested. Minor bony retropulsion is seen associated with fractures at all the described levels, greatest and measuring 4 mm at T10 and T12.    Along the posterior aspect of the T10 vertebral body, there is asymmetric enhancing soft tissue, which appears to extend into the ventral epidural space with circumferential involvement at the T10-T11 level (series 10/images 6-11 and series 9/images   56-59). In combination with bony retropulsion of the T10 vertebral body, there is moderate central spinal canal stenosis. The abnormal enhancement extends towards the bilateral neural foramina at T10-T11.    Similar exaggeration of the mid thoracic kyphosis centered at T6. No substantial listhesis. No signal abnormality to suggest acute traumatic ligamentous injury in the thoracic spine.    Modic type II degenerative marrow changes are seen at multiple levels ventrally including T4-T5, T5-T6, T6-T7, and T7-T8.    Multilevel degenerative disc disease with disc height loss greatest and moderate at  T8-T9. Multilevel facet arthropathy. Mild posterior disc bulging at T6-T7. No high-grade spinal canal or neural foraminal stenosis related to degenerative disease.    No cord signal abnormality. No pathologic cord enhancement.    Rounded 7 mm fat signal lesion involving the left aspect of the T3 vertebral body, favored to represent a small hemangioma.    No extraspinal abnormality.    LUMBAR SPINE:   Transitional lumbosacral anatomy with sacralized L5 segment. Fracture of the L2 superior endplate with approximately 30% height loss and mild patchy STIR hyperintense signal abnormality and enhancement throughout. There also appears to be a fracture of   the L3 superior endplate with minimal height loss and mild patchy STIR hyperintense signal abnormality and enhancement throughout the vertebral body. Approximately 4 mm bony retropulsion at L2 and L3. Abnormal enhancement is seen throughout the posterior   elements of all lumbar levels, concerning for myelomatous involvement. Minor grade 1 anterolisthesis of L3 on L4. Normal distal spinal cord and cauda equina with conus medullaris at L1. No pathologic contrast enhancement involving the distal spinal cord   or epidural spaces of the lumbar spine. Markedly heterogenous appearance of the sacrum/bony pelvis with patchy areas of irregular nodular enhancement.    T12-L1: Normal disc height and signal. Small left paracentral disc protrusion with annular fissuring. Mild lateral facet hypertrophy. No substantial spinal canal stenosis. Mild bilateral neural foraminal stenosis.     L1-L2: Expansion of the disc space related to L2 superior endplate fracture. No disc herniation. Mild bilateral facet hypertrophy. Mild spinal canal stenosis related to L2 vertebral body bony retropulsion. No substantial neural foraminal stenosis.    L2-L3: Normal disc height and signal. Minimal posterior disc bulging. Moderate lateral facet hypertrophy. Slight ligament flavum thickening. No  substantial spinal canal stenosis. No substantial neural foraminal stenosis.     L3-L4: Normal disc height and signal. Minor anterolisthesis. Moderate to advanced lateral facet hypertrophy. Moderate sized facet joint effusions bilaterally without periarticular edema. Ligamentum flavum thickening. No substantial spinal canal stenosis.   Mild bilateral neural foraminal stenosis.    L4-L5: Normal disc height and signal. Small left foraminal disc protrusion. Moderate bilateral facet hypertrophy. No substantial spinal canal stenosis. Moderate left neural foraminal stenosis. No substantial right neural foraminal stenosis.    L5-S1: Rudimentary disc. No substantial spinal canal or neural foraminal stenosis.      Impression    IMPRESSION:    THORACIC SPINE MRI:  1.  Unchanged height loss associated with vertebral body fractures at T5, T6, T8, T10, and T12 as seen on prior same-day CT study and further detailed above. Mild to moderate patchy STIR hyperintense signal abnormality and associated enhancement are seen   at these levels, particularly at T5, T8, T10, and T12, suggesting subacute processes. Given patient's reported history of multiple myeloma, there is a high concern for pathologic fractures.  2.  Abnormal asymmetric enhancement along the posterior aspect of the T10 vertebral body, which appears to extend into the ventral epidural space with circumferential involvement at the T10-T11 level, concerning for soft tissue component of T10 vertebral   body neoplasm. In combination with bony retropulsion of the T10 vertebral body, there is moderate spinal canal stenosis. Abnormal enhancement also extends towards the bilateral T10-T11 neural foramina. Advise close attention on future follow-up imaging.  3.  No cord signal abnormality or pathologic cord enhancement.  4.  Multilevel thoracic spondylosis without associated high-grade spinal canal or neural foraminal stenosis.    LUMBAR SPINE MRI:  1.  Transitional lumbosacral  anatomy with sacralized L5 segment.  2.  Findings concerning for extensive myelomatous involvement throughout the lumbar spine and sacrum/bony pelvis as detailed above.  3.  Suspected pathologic fractures involving the L2 and L3 vertebral bodies with mild patchy STIR hyperintense signal abnormality and enhancement throughout these vertebral bodies, likely representing a subacute process.  4.  Multilevel lumbar spondylosis.  5.  No high-grade spinal canal or neural foraminal stenosis.  6.  No pathologic contrast enhancement involving the distal spinal cord or epidural spaces of the lumbar spine.

## 2024-11-03 NOTE — CONSULTS
"Neurosurgery Consultation    Date of Service:  11/03/2024  Date of Admission:  11/2/2024  Hospital Day: 2    Assessment/Plan  Scarlet Chapa is a 69 year old female with a past medical history of recent diagnosis of multiple myeloma admitted on 11/2/2024 for continued back pain.    24 hour events: MRI T and L-spine completed overnight showing pathologic fractures and numerous levels.    Neuro  # Concern for pathologic fractures secondary to multiple myeloma at T5-T6 T8 T10 and T12 L2-L3  -Oncology consult  -Radiation oncology consult  -Can consider bracing for comfort  -Pain control  -No planned neurosurgical intervention for pathologic fractures would defer to radiation oncology for treatment.  -Palliative care/goals of care discussion may be warranted  -Defer to patient and primary team on patient will continue to follow-up with already established care oncology team at [facility]  Neurosurgery to sign off      Clinically Significant Risk Factors Present on Admission                   # Hypertension: Noted on problem list    # Anemia: based on hgb <11       # Obesity: Estimated body mass index is 34.33 kg/m  as calculated from the following:    Height as of this encounter: 1.626 m (5' 4\").    Weight as of this encounter: 90.7 kg (200 lb).                  TIME SPENT ON THIS ENCOUNTER   I spent 80 minutes of time on the unit/floor managing the care of Scarlet Chapa excluding time performing procedures. I have personally reviewed the following data/imaging over the past 24 hours.     The patient was discussed with Dr. Cota, [facility]  Vocera messaging strongly preferred  Please always look up on-call provider before messaging/paging    Tel [phone]    History of Present Illness:  Scarlet Chapa is a 69 year old female with a past medical history of recent diagnosis of multiple myeloma admitted on 11/2/2024 for continued back pain.    Patient started knowing severe back " pain in October.  Workup included MRI at that noted multiple compression fractures of thoracic spine.  Ultimately patient was diagnosed with multiple myeloma.  Patient was referred to oncology at [facility] where she was seen by Malissa donis nurse practitioner in 1029.  Bone marrow biopsy was obtained on October 31 which confirmed diagnosis.  She was set to meet with oncology team again this week.  Patient's pain became so severe despite taking oxycodone that she presented to the ED.  Family is concerned that she is unable to care for herself at home.  Patient reports recently weight loss of 20+ pounds    Due to the numerous levels of fracture patient would not be candidate for osteo cool or any other vertebral augmentation.  Imaging and fractures appear stable and patient would not benefit from more extensive neurosurgical intervention.  Defer management to heme and radiation oncology team    Allergies   Allergen Reactions    Gluten Meal Other (See Comments)     abd bloating, gas    Casein Other (See Comments)     Sneezing, watery eyes       Current Medications:  Current Facility-Administered Medications   Medication Dose Route Frequency Provider Last Rate Last Admin    acetaminophen (TYLENOL) tablet 975 mg  975 mg Oral Q6H Kelby Sidhu MD   975 mg at 11/03/24 0819    amLODIPine (NORVASC) tablet 5 mg  5 mg Oral Daily Kelby Sidhu MD   5 mg at 11/03/24 0820    enoxaparin ANTICOAGULANT (LOVENOX) injection 40 mg  40 mg Subcutaneous Q24H Kelby Sidhu MD   40 mg at 11/02/24 2016    oxyCODONE (oxyCONTIN) 12 hr tablet 20 mg  20 mg Oral Q12H Kelby Sidhu MD   20 mg at 11/03/24 0556    pantoprazole (PROTONIX) EC tablet 40 mg  40 mg Oral QAM AC Kelby Sidhu MD   40 mg at 11/03/24 0556    polyethylene glycol (MIRALAX) Packet 17 g  17 g Oral At Bedtime Kelby Sidhu MD   17 g at 11/02/24 2135    sennosides (SENOKOT) tablet 1 tablet  1 tablet Oral At Bedtime Kelby Sidhu MD   1 tablet at 11/02/24 2136     "sodium chloride (PF) 0.9% PF flush 3 mL  3 mL Intracatheter Q8H Kelby Sidhu MD   3 mL at 11/03/24 0820       PRN Medications:  Current Facility-Administered Medications   Medication Dose Route Frequency Provider Last Rate Last Admin    HYDROmorphone (PF) (DILAUDID) injection 0.3 mg  0.3 mg Intravenous Q2H PRN Kelby Sidhu MD        HYDROmorphone (PF) (DILAUDID) injection 0.5 mg  0.5 mg Intravenous Q2H PRN Kelby Sidhu MD        ketorolac (TORADOL) injection 15 mg  15 mg Intravenous Q6H PRN Kelby Sidhu MD        lidocaine (LMX4) cream   Topical Q1H PRN Kelby Sidhu MD        lidocaine 1 % 0.1-1 mL  0.1-1 mL Other Q1H PRN Kelby Sidhu MD        LORazepam (ATIVAN) injection 0.5 mg  0.5 mg Intravenous ONCE PRN Kelby Sidhu MD        ondansetron (ZOFRAN ODT) ODT tab 4 mg  4 mg Oral Q6H PRN Kelby Sidhu MD        Or    ondansetron (ZOFRAN) injection 4 mg  4 mg Intravenous Q6H PRN Kelby Sidhu MD        ondansetron (ZOFRAN) injection 4 mg  4 mg Intravenous Q30 Min PRN Kelby Sidhu MD        oxyCODONE (ROXICODONE) tablet 5 mg  5 mg Oral Q4H PRN Kelby Sidhu MD   5 mg at 11/03/24 0026    oxyCODONE IR (ROXICODONE) tablet 10 mg  10 mg Oral Q4H PRN Kelby Sidhu MD senna-docusate (SENOKOT-S/PERICOLACE) 8.6-50 MG per tablet 1 tablet  1 tablet Oral BID PRN Kelby Sidhu MD        Or    senna-docusate (SENOKOT-S/PERICOLACE) 8.6-50 MG per tablet 2 tablet  2 tablet Oral BID PRN Kelby Sidhu MD        sodium chloride (PF) 0.9% PF flush 3 mL  3 mL Intracatheter q1 min prn Kelby Sidhu MD           Infusions:  Current Facility-Administered Medications   Medication Dose Route Frequency Provider Last Rate Last Admin       Physical Examination:  Vitals: BP (!) 172/68 (BP Location: Left arm, Cuff Size: Adult Regular)   Pulse 100   Temp 98.3  F (36.8  C) (Temporal)   Resp 24   Ht 1.626 m (5' 4\")   Wt 90.7 kg (200 lb)   SpO2 92%   BMI 34.33 kg/m    General: Adult female patient, " lying in bed  HEENT: Normocephalic, atraumatic, no icterus, oral cavity/oropharynx pink and moist  Cardiac: Adequately perfused  Pulm: Unlabored, expansion symmetric, no retractions or use of accessory muscles  Abdomen: Soft, non-distended  Extremities: Warm, no edema, distal pulses +2, well perfused  Skin: No rash or lesion  Psych: Calm and cooperative, tearful  Neuro:  Mental status: Awake, alert, attentive, oriented to self, time, place, and circumstance. Language is fluent and coherent with intact comprehension of complex commands, naming and repetition.  Cranial nerves: VFF, PERRL, conjugate gaze, EOMI, facial sensation intact, face symmetric, shoulder shrug strong, tongue midline, no dysarthria.   Motor: Normal bulk and tone. No abnormal movements. 5/5 strength in 4/4 extremities.   Sensory: Intact to light touch x 4 extremities  Coordination: FNF and HS without ataxia or dysmetria. Rapid alternating movements intact.   Gait: RAFAL, deferred.    Does report tenderness to palpation of thoracic and lumbar spine.  Currently experiencing back pain with no radicular symptoms or paresthesias.    Labs and Imaging:    Results for orders placed or performed during the hospital encounter of 11/02/24 (from the past 24 hours)   CBC with platelets differential    Narrative    The following orders were created for panel order CBC with platelets differential.  Procedure                               Abnormality         Status                     ---------                               -----------         ------                     CBC with platelets and d...[[id number]]  Abnormal            Final result                 Please view results for these tests on the individual orders.   Basic metabolic panel   Result Value Ref Range    Sodium 137 135 - 145 mmol/L    Potassium 3.8 3.4 - 5.3 mmol/L    Chloride 101 98 - 107 mmol/L    Carbon Dioxide (CO2) 20 (L) 22 - 29 mmol/L    Anion Gap 16 (H) 7 - 15 mmol/L    Urea Nitrogen 13.5 8.0  - 23.0 mg/dL    Creatinine 0.77 0.51 - 0.95 mg/dL    GFR Estimate 84 >60 mL/min/1.73m2    Calcium 10.2 8.8 - 10.4 mg/dL    Glucose 100 (H) 70 - 99 mg/dL   CBC with platelets and differential   Result Value Ref Range    WBC Count 3.4 (L) 4.0 - 11.0 10e3/uL    RBC Count 3.42 (L) 3.80 - 5.20 10e6/uL    Hemoglobin 11.0 (L) 11.7 - 15.7 g/dL    Hematocrit 30.3 (L) 35.0 - 47.0 %    MCV 89 78 - 100 fL    MCH 32.2 26.5 - 33.0 pg    MCHC 36.3 31.5 - 36.5 g/dL    RDW 14.7 10.0 - 15.0 %    Platelet Count 215 150 - 450 10e3/uL    % Neutrophils 63 %    % Lymphocytes 22 %    % Monocytes 10 %    % Eosinophils 3 %    % Basophils 0 %    % Immature Granulocytes 2 %    NRBCs per 100 WBC 0 <1 /100    Absolute Neutrophils 2.2 1.6 - 8.3 10e3/uL    Absolute Lymphocytes 0.8 0.8 - 5.3 10e3/uL    Absolute Monocytes 0.3 0.0 - 1.3 10e3/uL    Absolute Eosinophils 0.1 0.0 - 0.7 10e3/uL    Absolute Basophils 0.0 0.0 - 0.2 10e3/uL    Absolute Immature Granulocytes 0.1 <=0.4 10e3/uL    Absolute NRBCs 0.0 10e3/uL   Hepatic panel   Result Value Ref Range    Protein Total 7.1 6.4 - 8.3 g/dL    Albumin 4.6 3.5 - 5.2 g/dL    Bilirubin Total 0.5 <=1.2 mg/dL    Alkaline Phosphatase 163 (H) 40 - 150 U/L    AST 42 0 - 45 U/L    ALT 54 (H) 0 - 50 U/L    Bilirubin Direct <0.20 0.00 - 0.30 mg/dL   CT Thoracic Spine w/o Contrast    Narrative    EXAM: CT THORACIC SPINE W/O CONTRAST  LOCATION: [facility]  DATE: 11/2/2024    INDICATION: hx compression fracture spine T8, worsening pain  COMPARISON: September 22, 2023  TECHNIQUE: Routine CT Thoracic Spine without IV contrast. Multiplanar reformats. Dose reduction techniques were used.     FINDINGS:  Multilevel compression fractures at T5, T6, T8, T10 and T12 which are age indeterminate, however the T6, T8, T10 and T12 fractures appear acute or subacute. This could be confirmed with a thoracic spine MRI. There is associated thoracic kyphosis. No   other evidence of acute fracture or subluxation  of the thoracic spine by CT imaging. Multilevel degenerative disc disease of the thoracic spine with disc height loss, most pronounced at T7/T8-T10/T11.    No significant posterior disc bulge, spinal canal, or neural foraminal narrowing at any level within the thoracic spine.      Impression    IMPRESSION:    1.  Multilevel compression fractures at T5, T6, T8, T10 and T12 which are age indeterminate, however the T6, T8, T10 and T12 fractures appear acute or subacute. This could be confirmed with a thoracic spine MRI.   2.  No other evidence of acute fracture or subluxation of the thoracic spine by CT imaging.  3.  Thoracic kyphosis.   MR Thoracic Spine w/o & w Contrast    Narrative    EXAM: MR THORACIC SPINE W/O and W CONTRAST, MR LUMBAR SPINE W/O and W CONTRAST  LOCATION: [facility]  DATE: 11/2/2024    INDICATION: concern for pathologic fractures, intractable back pain, newly diagnosed multiple myeloma  COMPARISON: Same day thoracic spine CT. Lumbar spine radiographs: 9/13/2024.  CONTRAST: 9 ml Gadavist  TECHNIQUE:   1) Routine Thoracic Spine MRI without and with IV contrast.  2) Routine Lumbar Spine MRI without and with IV contrast.    FINDINGS:    THORACIC SPINE:    Unchanged height loss associated with multiple thoracic vertebral body fractures from same day CT study including the following: T5 (approximately 25-30% height loss), T6 (50% height loss), T8 (75% height loss), T10 (50% height loss), and T12 (40% height   loss). There is a mild to moderate degree of patchy STIR hyperintense signal and enhancement associated with fractures at T5, T8, T10, and T12. Very faint edema signal and enhancement is also seen throughout the T6 vertebral body with areas of   hypointense fracture line suggested. Minor bony retropulsion is seen associated with fractures at all the described levels, greatest and measuring 4 mm at T10 and T12.    Along the posterior aspect of the T10 vertebral body, there is  asymmetric enhancing soft tissue, which appears to extend into the ventral epidural space with circumferential involvement at the T10-T11 level (series 10/images 6-11 and series 9/images   56-59). In combination with bony retropulsion of the T10 vertebral body, there is moderate central spinal canal stenosis. The abnormal enhancement extends towards the bilateral neural foramina at T10-T11.    Similar exaggeration of the mid thoracic kyphosis centered at T6. No substantial listhesis. No signal abnormality to suggest acute traumatic ligamentous injury in the thoracic spine.    Modic type II degenerative marrow changes are seen at multiple levels ventrally including T4-T5, T5-T6, T6-T7, and T7-T8.    Multilevel degenerative disc disease with disc height loss greatest and moderate at T8-T9. Multilevel facet arthropathy. Mild posterior disc bulging at T6-T7. No high-grade spinal canal or neural foraminal stenosis related to degenerative disease.    No cord signal abnormality. No pathologic cord enhancement.    Rounded 7 mm fat signal lesion involving the left aspect of the T3 vertebral body, favored to represent a small hemangioma.    No extraspinal abnormality.    LUMBAR SPINE:   Transitional lumbosacral anatomy with sacralized L5 segment. Fracture of the L2 superior endplate with approximately 30% height loss and mild patchy STIR hyperintense signal abnormality and enhancement throughout. There also appears to be a fracture of   the L3 superior endplate with minimal height loss and mild patchy STIR hyperintense signal abnormality and enhancement throughout the vertebral body. Approximately 4 mm bony retropulsion at L2 and L3. Abnormal enhancement is seen throughout the posterior   elements of all lumbar levels, concerning for myelomatous involvement. Minor grade 1 anterolisthesis of L3 on L4. Normal distal spinal cord and cauda equina with conus medullaris at L1. No pathologic contrast enhancement involving the distal  spinal cord   or epidural spaces of the lumbar spine. Markedly heterogenous appearance of the sacrum/bony pelvis with patchy areas of irregular nodular enhancement.    T12-L1: Normal disc height and signal. Small left paracentral disc protrusion with annular fissuring. Mild lateral facet hypertrophy. No substantial spinal canal stenosis. Mild bilateral neural foraminal stenosis.     L1-L2: Expansion of the disc space related to L2 superior endplate fracture. No disc herniation. Mild bilateral facet hypertrophy. Mild spinal canal stenosis related to L2 vertebral body bony retropulsion. No substantial neural foraminal stenosis.    L2-L3: Normal disc height and signal. Minimal posterior disc bulging. Moderate lateral facet hypertrophy. Slight ligament flavum thickening. No substantial spinal canal stenosis. No substantial neural foraminal stenosis.     L3-L4: Normal disc height and signal. Minor anterolisthesis. Moderate to advanced lateral facet hypertrophy. Moderate sized facet joint effusions bilaterally without periarticular edema. Ligamentum flavum thickening. No substantial spinal canal stenosis.   Mild bilateral neural foraminal stenosis.    L4-L5: Normal disc height and signal. Small left foraminal disc protrusion. Moderate bilateral facet hypertrophy. No substantial spinal canal stenosis. Moderate left neural foraminal stenosis. No substantial right neural foraminal stenosis.    L5-S1: Rudimentary disc. No substantial spinal canal or neural foraminal stenosis.      Impression    IMPRESSION:    THORACIC SPINE MRI:  1.  Unchanged height loss associated with vertebral body fractures at T5, T6, T8, T10, and T12 as seen on prior same-day CT study and further detailed above. Mild to moderate patchy STIR hyperintense signal abnormality and associated enhancement are seen   at these levels, particularly at T5, T8, T10, and T12, suggesting subacute processes. Given patient's reported history of multiple myeloma, there  is a high concern for pathologic fractures.  2.  Abnormal asymmetric enhancement along the posterior aspect of the T10 vertebral body, which appears to extend into the ventral epidural space with circumferential involvement at the T10-T11 level, concerning for soft tissue component of T10 vertebral   body neoplasm. In combination with bony retropulsion of the T10 vertebral body, there is moderate spinal canal stenosis. Abnormal enhancement also extends towards the bilateral T10-T11 neural foramina. Advise close attention on future follow-up imaging.  3.  No cord signal abnormality or pathologic cord enhancement.  4.  Multilevel thoracic spondylosis without associated high-grade spinal canal or neural foraminal stenosis.    LUMBAR SPINE MRI:  1.  Transitional lumbosacral anatomy with sacralized L5 segment.  2.  Findings concerning for extensive myelomatous involvement throughout the lumbar spine and sacrum/bony pelvis as detailed above.  3.  Suspected pathologic fractures involving the L2 and L3 vertebral bodies with mild patchy STIR hyperintense signal abnormality and enhancement throughout these vertebral bodies, likely representing a subacute process.  4.  Multilevel lumbar spondylosis.  5.  No high-grade spinal canal or neural foraminal stenosis.  6.  No pathologic contrast enhancement involving the distal spinal cord or epidural spaces of the lumbar spine.   MR Lumbar Spine w/o & w Contrast    Narrative    EXAM: MR THORACIC SPINE W/O and W CONTRAST, MR LUMBAR SPINE W/O and W CONTRAST  LOCATION: [facility]  DATE: 11/2/2024    INDICATION: concern for pathologic fractures, intractable back pain, newly diagnosed multiple myeloma  COMPARISON: Same day thoracic spine CT. Lumbar spine radiographs: 9/13/2024.  CONTRAST: 9 ml Gadavist  TECHNIQUE:   1) Routine Thoracic Spine MRI without and with IV contrast.  2) Routine Lumbar Spine MRI without and with IV contrast.    FINDINGS:    THORACIC  SPINE:    Unchanged height loss associated with multiple thoracic vertebral body fractures from same day CT study including the following: T5 (approximately 25-30% height loss), T6 (50% height loss), T8 (75% height loss), T10 (50% height loss), and T12 (40% height   loss). There is a mild to moderate degree of patchy STIR hyperintense signal and enhancement associated with fractures at T5, T8, T10, and T12. Very faint edema signal and enhancement is also seen throughout the T6 vertebral body with areas of   hypointense fracture line suggested. Minor bony retropulsion is seen associated with fractures at all the described levels, greatest and measuring 4 mm at T10 and T12.    Along the posterior aspect of the T10 vertebral body, there is asymmetric enhancing soft tissue, which appears to extend into the ventral epidural space with circumferential involvement at the T10-T11 level (series 10/images 6-11 and series 9/images   56-59). In combination with bony retropulsion of the T10 vertebral body, there is moderate central spinal canal stenosis. The abnormal enhancement extends towards the bilateral neural foramina at T10-T11.    Similar exaggeration of the mid thoracic kyphosis centered at T6. No substantial listhesis. No signal abnormality to suggest acute traumatic ligamentous injury in the thoracic spine.    Modic type II degenerative marrow changes are seen at multiple levels ventrally including T4-T5, T5-T6, T6-T7, and T7-T8.    Multilevel degenerative disc disease with disc height loss greatest and moderate at T8-T9. Multilevel facet arthropathy. Mild posterior disc bulging at T6-T7. No high-grade spinal canal or neural foraminal stenosis related to degenerative disease.    No cord signal abnormality. No pathologic cord enhancement.    Rounded 7 mm fat signal lesion involving the left aspect of the T3 vertebral body, favored to represent a small hemangioma.    No extraspinal abnormality.    LUMBAR SPINE:    Transitional lumbosacral anatomy with sacralized L5 segment. Fracture of the L2 superior endplate with approximately 30% height loss and mild patchy STIR hyperintense signal abnormality and enhancement throughout. There also appears to be a fracture of   the L3 superior endplate with minimal height loss and mild patchy STIR hyperintense signal abnormality and enhancement throughout the vertebral body. Approximately 4 mm bony retropulsion at L2 and L3. Abnormal enhancement is seen throughout the posterior   elements of all lumbar levels, concerning for myelomatous involvement. Minor grade 1 anterolisthesis of L3 on L4. Normal distal spinal cord and cauda equina with conus medullaris at L1. No pathologic contrast enhancement involving the distal spinal cord   or epidural spaces of the lumbar spine. Markedly heterogenous appearance of the sacrum/bony pelvis with patchy areas of irregular nodular enhancement.    T12-L1: Normal disc height and signal. Small left paracentral disc protrusion with annular fissuring. Mild lateral facet hypertrophy. No substantial spinal canal stenosis. Mild bilateral neural foraminal stenosis.     L1-L2: Expansion of the disc space related to L2 superior endplate fracture. No disc herniation. Mild bilateral facet hypertrophy. Mild spinal canal stenosis related to L2 vertebral body bony retropulsion. No substantial neural foraminal stenosis.    L2-L3: Normal disc height and signal. Minimal posterior disc bulging. Moderate lateral facet hypertrophy. Slight ligament flavum thickening. No substantial spinal canal stenosis. No substantial neural foraminal stenosis.     L3-L4: Normal disc height and signal. Minor anterolisthesis. Moderate to advanced lateral facet hypertrophy. Moderate sized facet joint effusions bilaterally without periarticular edema. Ligamentum flavum thickening. No substantial spinal canal stenosis.   Mild bilateral neural foraminal stenosis.    L4-L5: Normal disc height and  signal. Small left foraminal disc protrusion. Moderate bilateral facet hypertrophy. No substantial spinal canal stenosis. Moderate left neural foraminal stenosis. No substantial right neural foraminal stenosis.    L5-S1: Rudimentary disc. No substantial spinal canal or neural foraminal stenosis.      Impression    IMPRESSION:    THORACIC SPINE MRI:  1.  Unchanged height loss associated with vertebral body fractures at T5, T6, T8, T10, and T12 as seen on prior same-day CT study and further detailed above. Mild to moderate patchy STIR hyperintense signal abnormality and associated enhancement are seen   at these levels, particularly at T5, T8, T10, and T12, suggesting subacute processes. Given patient's reported history of multiple myeloma, there is a high concern for pathologic fractures.  2.  Abnormal asymmetric enhancement along the posterior aspect of the T10 vertebral body, which appears to extend into the ventral epidural space with circumferential involvement at the T10-T11 level, concerning for soft tissue component of T10 vertebral   body neoplasm. In combination with bony retropulsion of the T10 vertebral body, there is moderate spinal canal stenosis. Abnormal enhancement also extends towards the bilateral T10-T11 neural foramina. Advise close attention on future follow-up imaging.  3.  No cord signal abnormality or pathologic cord enhancement.  4.  Multilevel thoracic spondylosis without associated high-grade spinal canal or neural foraminal stenosis.    LUMBAR SPINE MRI:  1.  Transitional lumbosacral anatomy with sacralized L5 segment.  2.  Findings concerning for extensive myelomatous involvement throughout the lumbar spine and sacrum/bony pelvis as detailed above.  3.  Suspected pathologic fractures involving the L2 and L3 vertebral bodies with mild patchy STIR hyperintense signal abnormality and enhancement throughout these vertebral bodies, likely representing a subacute process.  4.  Multilevel lumbar  spondylosis.  5.  No high-grade spinal canal or neural foraminal stenosis.  6.  No pathologic contrast enhancement involving the distal spinal cord or epidural spaces of the lumbar spine.   Basic metabolic panel   Result Value Ref Range    Sodium 137 135 - 145 mmol/L    Potassium 4.2 3.4 - 5.3 mmol/L    Chloride 102 98 - 107 mmol/L    Carbon Dioxide (CO2) 22 22 - 29 mmol/L    Anion Gap 13 7 - 15 mmol/L    Urea Nitrogen 14.5 8.0 - 23.0 mg/dL    Creatinine 0.84 0.51 - 0.95 mg/dL    GFR Estimate 76 >60 mL/min/1.73m2    Calcium 9.7 8.8 - 10.4 mg/dL    Glucose 94 70 - 99 mg/dL   CBC with platelets   Result Value Ref Range    WBC Count 3.7 (L) 4.0 - 11.0 10e3/uL    RBC Count 3.30 (L) 3.80 - 5.20 10e6/uL    Hemoglobin 10.3 (L) 11.7 - 15.7 g/dL    Hematocrit 30.3 (L) 35.0 - 47.0 %    MCV 92 78 - 100 fL    MCH 31.2 26.5 - 33.0 pg    MCHC 34.0 31.5 - 36.5 g/dL    RDW 15.2 (H) 10.0 - 15.0 %    Platelet Count 189 150 - 450 10e3/uL       All relevant imaging and laboratory values personally reviewed.    Dragon Disclosure   This was created at least in part with a voice recognition software. Mistakes/typos may be present.

## 2024-11-03 NOTE — PLAN OF CARE
"Goal Outcome Evaluation:      Plan of Care Reviewed With: patient    Overall Patient Progress: improvingOverall Patient Progress: improving         [id number]  Reporting decreased pain. A1 wgb. Voiding well. RA. PRN oxy 5 given 0026   Plan tbd      Problem: Adult Inpatient Plan of Care  Goal: Plan of Care Review  Description: The Plan of Care Review/Shift note should be completed every shift.  The Outcome Evaluation is a brief statement about your assessment that the patient is improving, declining, or no change.  This information will be displayed automatically on your shift  note.  Outcome: Progressing  Flowsheets (Taken 11/2/2024 2220)  Plan of Care Reviewed With: patient  Overall Patient Progress: improving  Goal: Patient-Specific Goal (Individualized)  Description: You can add care plan individualizations to a care plan. Examples of Individualization might be:  \"Parent requests to be called daily at 9am for status\", \"I have a hard time hearing out of my right ear\", or \"Do not touch me to wake me up as it startles  me\".  Outcome: Progressing  Goal: Absence of Hospital-Acquired Illness or Injury  Outcome: Progressing  Intervention: Identify and Manage Fall Risk  Recent Flowsheet Documentation  Taken 11/2/2024 2212 by Adria Reno, RN  Safety Promotion/Fall Prevention:   supervised activity   safety round/check completed   room organization consistent   room door open   room near nurse's station   nonskid shoes/slippers when out of bed   mobility aid in reach   clutter free environment maintained   activity supervised  Intervention: Prevent Skin Injury  Recent Flowsheet Documentation  Taken 11/2/2024 2212 by Adria Reno, RN  Skin Protection: adhesive use limited  Intervention: Prevent and Manage VTE (Venous Thromboembolism) Risk  Recent Flowsheet Documentation  Taken 11/2/2024 2212 by Adria Reno, RN  VTE Prevention/Management: SCDs off (sequential compression devices)  Goal: Optimal Comfort and " Wellbeing  Outcome: Progressing  Intervention: Monitor Pain and Promote Comfort  Recent Flowsheet Documentation  Taken 11/2/2024 1936 by Adria Reno, RN  Pain Management Interventions: declines  Goal: Readiness for Transition of Care  Outcome: Progressing  Intervention: Mutually Develop Transition Plan  Recent Flowsheet Documentation  Taken 11/2/2024 1941 by Adria Reno, RN  Equipment Currently Used at Home: walker, rolling

## 2024-11-03 NOTE — CONSULTS
Care Management Initial Consult    General Information  Assessment completed with: Patient,    Type of CM/SW Visit: Initial Assessment    Primary Care Provider verified and updated as needed:     Readmission within the last 30 days:        Reason for Consult: discharge planning, emotional/coping/adjustment concerns, transportation  Advance Care Planning:            Communication Assessment  Patient's communication style: spoken language (English or Bilingual)    Hearing Difficulty or Deaf: no   Wear Glasses or Blind: yes    Cognitive  Cognitive/Neuro/Behavioral: WDL                      Living Environment:   People in home: alone     Current living Arrangements: house      Able to return to prior arrangements:         Family/Social Support:  Care provided by: self  Provides care for: no one, unable/limited ability to care for self  Marital Status: Single  Support system: Sibling(s), Friend, Neighbor          Description of Support System:           Current Resources:   Patient receiving home care services:          Community Resources:    Equipment currently used at home: walker, rolling  Supplies currently used at home:      Employment/Financial:  Employment Status: employed part-time        Financial Concerns:             Does the patient's insurance plan have a 3 day qualifying hospital stay waiver?  Yes     Which insurance plan 3 day waiver is available? Alternative insurance waiver    Will the waiver be used for post-acute placement? Undetermined at this time    Lifestyle & Psychosocial Needs:  Social Drivers of Health     Food Insecurity: Low Risk  (11/2/2024)    Food Insecurity     Within the past 12 months, did you worry that your food would run out before you got money to buy more?: No     Within the past 12 months, did the food you bought just not last and you didn t have money to get more?: No   Depression: Not at risk (9/26/2023)    PHQ-2     PHQ-2 Score: 0   Housing Stability: Low Risk  (11/2/2024)     Housing Stability     Do you have housing? : Yes     Are you worried about losing your housing?: No   Tobacco Use: Low Risk  (10/31/2024)    Received from [employer]    Patient History     Smoking Tobacco Use: Never     Smokeless Tobacco Use: Never     Passive Exposure: Not on file   Financial Resource Strain: Low Risk  (11/2/2024)    Financial Resource Strain     Within the past 12 months, have you or your family members you live with been unable to get utilities (heat, electricity) when it was really needed?: No   Alcohol Use: Not on file   Transportation Needs: High Risk (11/2/2024)    Transportation Needs     Within the past 12 months, has lack of transportation kept you from medical appointments, getting your medicines, non-medical meetings or appointments, work, or from getting things that you need?: Yes   Physical Activity: Not on file   Interpersonal Safety: Low Risk  (11/2/2024)    Interpersonal Safety     Do you feel physically and emotionally safe where you currently live?: Yes     Within the past 12 months, have you been hit, slapped, kicked or otherwise physically hurt by someone?: No     Within the past 12 months, have you been humiliated or emotionally abused in other ways by your partner or ex-partner?: No   Stress: Not on file   Social Connections: Socially Integrated (5/15/2024)    Received from [employer]    Social Connections     Do you often feel lonely or isolated from those around you?: 0   Health Literacy: Not on file       Functional Status:  Prior to admission patient needed assistance:   Dependent ADLs:: Ambulation-walker  Dependent IADLs:: Independent       Mental Health Status:  Mental Health Status: No Current Concerns       Chemical Dependency Status:  Chemical Dependency Status: No Current Concerns             Values/Beliefs:  Spiritual, Cultural Beliefs, Amish Practices, Values that affect care:                  Discussed  Partnership in Safe Discharge Planning  document with patient/family: No    Additional Information:  Consult received for SDOH (transportation).  Sw met with pt who reports she lives alone in a town house. She reports she was working part time.  She states she is having a harder time managing ADL's and IADL's due to pain. She reports she was recently  given diagnosis of multiple myeloma and will be meeting with oncology next week.      Pt reports she uses a walker.  She does not have any current services.     Pt reports she has no children and only two brothers in the area and a sister in [place] otherwise no other family.      Pt reports she was driving up until 3 months ago and now has been having her brothers or a neighbor help with transportation.      It appears that she is working with oncology SW who has provided her with a list of transportation resources through her chart.  Pt reports she hasn't had a chance to review resources yet but is aware that resources were being sent.     It appears she is assist of 1, however, there are no PT/OT consults at this time.  SW discussed with nurse who will talk with MD.      Next Steps: SW will continue to follow and assist with discharge planning.        Angelica MCGRAW, [facility]  Inpatient Care Coordination   [facility]   [phone]

## 2024-11-04 ENCOUNTER — APPOINTMENT (OUTPATIENT)
Dept: PHYSICAL THERAPY | Facility: CLINIC | Age: 69
DRG: 542 | End: 2024-11-04
Attending: STUDENT IN AN ORGANIZED HEALTH CARE EDUCATION/TRAINING PROGRAM
Payer: COMMERCIAL

## 2024-11-04 ENCOUNTER — TRANSFERRED RECORDS (OUTPATIENT)
Dept: HEALTH INFORMATION MANAGEMENT | Facility: CLINIC | Age: 69
End: 2024-11-04
Payer: COMMERCIAL

## 2024-11-04 LAB — PLATELET # BLD AUTO: 212 10E3/UL (ref 150–450)

## 2024-11-04 PROCEDURE — 97161 PT EVAL LOW COMPLEX 20 MIN: CPT | Mod: GP

## 2024-11-04 PROCEDURE — 120N000001 HC R&B MED SURG/OB

## 2024-11-04 PROCEDURE — 82565 ASSAY OF CREATININE: CPT | Performed by: INTERNAL MEDICINE

## 2024-11-04 PROCEDURE — 250N000011 HC RX IP 250 OP 636: Performed by: INTERNAL MEDICINE

## 2024-11-04 PROCEDURE — 97530 THERAPEUTIC ACTIVITIES: CPT | Mod: GP

## 2024-11-04 PROCEDURE — 250N000013 HC RX MED GY IP 250 OP 250 PS 637: Performed by: INTERNAL MEDICINE

## 2024-11-04 PROCEDURE — 85049 AUTOMATED PLATELET COUNT: CPT | Performed by: INTERNAL MEDICINE

## 2024-11-04 PROCEDURE — 99232 SBSQ HOSP IP/OBS MODERATE 35: CPT | Performed by: STUDENT IN AN ORGANIZED HEALTH CARE EDUCATION/TRAINING PROGRAM

## 2024-11-04 PROCEDURE — 99222 1ST HOSP IP/OBS MODERATE 55: CPT | Performed by: PHYSICIAN ASSISTANT

## 2024-11-04 PROCEDURE — 250N000013 HC RX MED GY IP 250 OP 250 PS 637: Performed by: STUDENT IN AN ORGANIZED HEALTH CARE EDUCATION/TRAINING PROGRAM

## 2024-11-04 PROCEDURE — 250N000013 HC RX MED GY IP 250 OP 250 PS 637: Performed by: PHYSICIAN ASSISTANT

## 2024-11-04 PROCEDURE — D0061ZZ BEAM RADIATION OF SPINAL CORD USING PHOTONS 1 - 10 MEV: ICD-10-PCS | Performed by: RADIOLOGY

## 2024-11-04 PROCEDURE — 99233 SBSQ HOSP IP/OBS HIGH 50: CPT | Performed by: STUDENT IN AN ORGANIZED HEALTH CARE EDUCATION/TRAINING PROGRAM

## 2024-11-04 PROCEDURE — 36415 COLL VENOUS BLD VENIPUNCTURE: CPT | Performed by: INTERNAL MEDICINE

## 2024-11-04 RX ORDER — NALOXONE HYDROCHLORIDE 0.4 MG/ML
0.2 INJECTION, SOLUTION INTRAMUSCULAR; INTRAVENOUS; SUBCUTANEOUS
Status: DISCONTINUED | OUTPATIENT
Start: 2024-11-04 | End: 2024-11-10 | Stop reason: HOSPADM

## 2024-11-04 RX ORDER — OXYCODONE HYDROCHLORIDE 10 MG/1
10 TABLET ORAL EVERY 4 HOURS PRN
Status: DISCONTINUED | OUTPATIENT
Start: 2024-11-04 | End: 2024-11-10 | Stop reason: HOSPADM

## 2024-11-04 RX ORDER — POLYETHYLENE GLYCOL 3350 17 G/17G
17 POWDER, FOR SOLUTION ORAL 2 TIMES DAILY
Status: DISCONTINUED | OUTPATIENT
Start: 2024-11-04 | End: 2024-11-10 | Stop reason: HOSPADM

## 2024-11-04 RX ORDER — NALOXONE HYDROCHLORIDE 0.4 MG/ML
0.4 INJECTION, SOLUTION INTRAMUSCULAR; INTRAVENOUS; SUBCUTANEOUS
Status: DISCONTINUED | OUTPATIENT
Start: 2024-11-04 | End: 2024-11-10 | Stop reason: HOSPADM

## 2024-11-04 RX ORDER — HYDRALAZINE HYDROCHLORIDE 25 MG/1
25 TABLET, FILM COATED ORAL 4 TIMES DAILY PRN
Status: DISCONTINUED | OUTPATIENT
Start: 2024-11-04 | End: 2024-11-10 | Stop reason: HOSPADM

## 2024-11-04 RX ADMIN — PANTOPRAZOLE SODIUM 40 MG: 40 TABLET, DELAYED RELEASE ORAL at 05:41

## 2024-11-04 RX ADMIN — POLYETHYLENE GLYCOL 3350 17 G: 17 POWDER, FOR SOLUTION ORAL at 20:02

## 2024-11-04 RX ADMIN — OXYCODONE HYDROCHLORIDE 20 MG: 10 TABLET, FILM COATED, EXTENDED RELEASE ORAL at 18:53

## 2024-11-04 RX ADMIN — ACETAMINOPHEN 975 MG: 325 TABLET, FILM COATED ORAL at 03:40

## 2024-11-04 RX ADMIN — ACETAMINOPHEN 975 MG: 325 TABLET, FILM COATED ORAL at 08:29

## 2024-11-04 RX ADMIN — LOSARTAN POTASSIUM 100 MG: 100 TABLET, FILM COATED ORAL at 08:29

## 2024-11-04 RX ADMIN — OXYCODONE HYDROCHLORIDE 15 MG: 10 TABLET ORAL at 15:22

## 2024-11-04 RX ADMIN — AMLODIPINE BESYLATE 5 MG: 5 TABLET ORAL at 08:29

## 2024-11-04 RX ADMIN — ONDANSETRON 4 MG: 4 TABLET, ORALLY DISINTEGRATING ORAL at 18:08

## 2024-11-04 RX ADMIN — OXYCODONE HYDROCHLORIDE 10 MG: 10 TABLET ORAL at 00:13

## 2024-11-04 RX ADMIN — OXYCODONE HYDROCHLORIDE 20 MG: 10 TABLET, FILM COATED, EXTENDED RELEASE ORAL at 05:41

## 2024-11-04 RX ADMIN — HYDRALAZINE HYDROCHLORIDE 25 MG: 25 TABLET ORAL at 16:06

## 2024-11-04 RX ADMIN — ACETAMINOPHEN 975 MG: 325 TABLET, FILM COATED ORAL at 20:02

## 2024-11-04 RX ADMIN — OXYCODONE HYDROCHLORIDE 10 MG: 10 TABLET ORAL at 20:02

## 2024-11-04 RX ADMIN — OXYCODONE HYDROCHLORIDE 10 MG: 10 TABLET ORAL at 11:16

## 2024-11-04 RX ADMIN — ENOXAPARIN SODIUM 40 MG: 40 INJECTION SUBCUTANEOUS at 20:02

## 2024-11-04 RX ADMIN — ACETAMINOPHEN 975 MG: 325 TABLET, FILM COATED ORAL at 15:22

## 2024-11-04 RX ADMIN — SENNOSIDES 1 TABLET: 8.6 TABLET, FILM COATED ORAL at 20:13

## 2024-11-04 NOTE — PROGRESS NOTES
"[facility]    Medicine Progress Note - Hospitalist Service    Date of Admission:  11/2/2024    Assessment & Plan   69-year-old female with recently diagnosed multiple myeloma on bone marrow biopsy but has not been started on treatment yet.  She comes in with severe back pain with multiple pathological fractures T5, 8, 10 and 12 seen on MRI and CT with no cord abnormality.      Severe back pain due to pathological fractures and T5, 8, 10 and 12 and L2/3 vertebral bodies.  Neurosurgery consult appreciated, no plan for neurosurgical intervention.  Recommended to consider bracing for comfort, which the patient did not appear to tolerate due to claustrophobia.  To start radiation therapy to the spine today.  Started on scheduled Tylenol 975 mg 4 times daily, scheduled OxyContin 20 mg twice daily and as needed IV Dilaudid, as needed p.o. oxycodone and as needed IV ketorolac.  Pain service consulted.  To work with PT once pain is better controlled.    Multiple myeloma.  Recent diagnosis but has not been started on treatment yet.  Patient has a meeting scheduled with her oncology team coming Tuesday but will likely be still in the hospital.    Essential hypertension.  Resumed amlodipine and losartan.  Blood pressure still high, due to pain.  As needed hydralazine ordered.    Anemia and mild leukopenia.  Likely secondary to multiple myeloma.          Diet: Gluten Free Diet    DVT Prophylaxis: Enoxaparin (Lovenox) SQ  Vargas Catheter: Not present  Lines: None     Cardiac Monitoring: None  Code Status: Full Code      Clinically Significant Risk Factors                   # Hypertension: Noted on problem list           # Obesity: Estimated body mass index is 34.33 kg/m  as calculated from the following:    Height as of this encounter: 1.626 m (5' 4\").    Weight as of this encounter: 90.7 kg (200 lb)., PRESENT ON ADMISSION            Disposition Plan     Medically Ready for Discharge: Anticipated in 2-4 " Days             Luis Carpenter MD  Hospitalist Service  [facility]  Securely message with [employer] (more info)  Text page via [employer] Paging/Directory   ______________________________________________________________________    Interval History   Continues to have severe back pain and moving.    Physical Exam   Vital Signs: Temp: 98.8  F (37.1  C) Temp src: Temporal BP: (!) 189/75 Pulse: 112   Resp: 18 SpO2: 93 % O2 Device: None (Room air)    Weight: 200 lbs 0 oz    General Appearance: Alert awake and oriented x 3.  Lying in the bed and does not move much due to fear of pain.  Respiratory: Clear to auscultation  Cardiovascular: S1-S2 normal  GI: Soft and nontender  Skin: No rash  Other: No edema      Medical Decision Making       MANAGEMENT DISCUSSED with the following over the past 24 hours: Patient and RN       Data         Imaging results reviewed over the past 24 hrs:   No results found for this or any previous visit (from the past 24 hours).

## 2024-11-04 NOTE — PLAN OF CARE
"Goal Outcome Evaluation:      Plan of Care Reviewed With: patient    Overall Patient Progress: no changeOverall Patient Progress: no change         [id number]   Calm and cooperative for the most part.  Pain well controlled unless movement PRN oxy 10 at 1949, 0013  Offered IV prn medications. Pt declined multiple times.  RA. A1 wgb. Voiding well. Slept well overnight  Plan tbd      Problem: Adult Inpatient Plan of Care  Goal: Plan of Care Review  Description: The Plan of Care Review/Shift note should be completed every shift.  The Outcome Evaluation is a brief statement about your assessment that the patient is improving, declining, or no change.  This information will be displayed automatically on your shift  note.  Outcome: Progressing  Flowsheets (Taken 11/4/2024 0144)  Plan of Care Reviewed With: patient  Overall Patient Progress: no change  Goal: Patient-Specific Goal (Individualized)  Description: You can add care plan individualizations to a care plan. Examples of Individualization might be:  \"Parent requests to be called daily at 9am for status\", \"I have a hard time hearing out of my right ear\", or \"Do not touch me to wake me up as it startles  me\".  Outcome: Progressing  Goal: Absence of Hospital-Acquired Illness or Injury  Outcome: Progressing  Intervention: Identify and Manage Fall Risk  Recent Flowsheet Documentation  Taken 11/3/2024 1949 by Adria Reno, RN  Safety Promotion/Fall Prevention:   supervised activity   safety round/check completed   room organization consistent   room door open   room near nurse's station   nonskid shoes/slippers when out of bed   mobility aid in reach   clutter free environment maintained   activity supervised  Intervention: Prevent Skin Injury  Recent Flowsheet Documentation  Taken 11/3/2024 2329 by Adria Reno, RN  Body Position:   position changed independently   weight shifting   supine, head elevated   side-lying 30 degrees  Taken 11/3/2024 1949 by Rowdy, " Adria, RN  Body Position: position changed independently  Skin Protection: adhesive use limited  Intervention: Prevent and Manage VTE (Venous Thromboembolism) Risk  Recent Flowsheet Documentation  Taken 11/3/2024 1949 by Ardia Reno, RN  VTE Prevention/Management: SCDs off (sequential compression devices)  Goal: Optimal Comfort and Wellbeing  Outcome: Progressing  Intervention: Monitor Pain and Promote Comfort  Recent Flowsheet Documentation  Taken 11/3/2024 2207 by Adria Reno, RN  Pain Management Interventions: declines  Taken 11/3/2024 1949 by Adria eRno, RN  Pain Management Interventions: medication (see MAR)  Goal: Readiness for Transition of Care  Outcome: Progressing

## 2024-11-04 NOTE — PROGRESS NOTES
Here to see patient for tlso.  Rn assisted with patient at edge of bed.  I tried the orthosis.  Patient feels supported but after having on for a few minutes felt like it was hurting the small of her back.  She wants to think on it and will have nursing follow with us if wants to pursue.  Gene WALKER.

## 2024-11-04 NOTE — PROGRESS NOTES
11/04/24 1216   Appointment Info   Signing Clinician's Name / Credentials (PT) Lanie Dacosta DPT   Rehab Comments (PT) spinal precautions   Living Environment   People in Home alone   Current Living Arrangements house   Home Accessibility no concerns   Transportation Anticipated family or friend will provide;agency   Living Environment [place] and all needs met on main level   Self-Care   Usual Activity Tolerance good   Current Activity Tolerance moderate   Equipment Currently Used at Home walker, rolling   Fall history within last six months no   Activity/Exercise/Self-Care Comment Timothy with walker at baseline for mobility and cares   General Information   Onset of Illness/Injury or Date of Surgery 11/02/24   Referring Physician Luis Carpenter MD   Patient/Family Therapy Goals Statement (PT) return home, improve mobility   Pertinent History of Current Problem (include personal factors and/or comorbidities that impact the POC) Scarlet Chapa is a 69 year old woman who presented to the ED today due to intolerable back pain.   Existing Precautions/Restrictions fall;spinal   Cognition   Affect/Mental Status (Cognition) WNL   Orientation Status (Cognition) oriented x 4   Follows Commands (Cognition) WNL   Pain Assessment   Patient Currently in Pain Yes, see Vital Sign flowsheet   Integumentary/Edema   Integumentary/Edema Comments normal aging changes   Posture    Posture Forward head position;Protracted shoulders   Range of Motion (ROM)   Range of Motion ROM deficits secondary to pain   Strength (Manual Muscle Testing)   Strength (Manual Muscle Testing) strength is WFL   Bed Mobility   Comment, (Bed Mobility) SBA supine>sit   Transfers   Comment, (Transfers) SBA sit<>stand with FWW   Gait/Stairs (Locomotion)   Distance in Feet (Gait) 10' for eval   Comment, (Gait/Stairs) SBA with FWW ambulation   Balance   Balance Comments good sitting balance, fair standing balance with  FWW for UE support   Sensory Examination   Sensory Perception patient reports no sensory changes   Clinical Impression   Criteria for Skilled Therapeutic Intervention Yes, treatment indicated   PT Diagnosis (PT) impaired mobility   Influenced by the following impairments pain, impaired balance, decreased activity tolerabnce   Functional limitations due to impairments impaired bed mobility, transfers, ambulation   Clinical Presentation (PT Evaluation Complexity) stable   Clinical Presentation Rationale clinical judgement, chart review   Clinical Decision Making (Complexity) low complexity   Planned Therapy Interventions (PT) balance training;bed mobility training;gait training;home exercise program;neuromuscular re-education;patient/family education;stair training;strengthening;transfer training;home program guidelines;risk factor education;progressive activity/exercise   Risk & Benefits of therapy have been explained evaluation/treatment results reviewed;care plan/treatment goals reviewed;risks/benefits reviewed;current/potential barriers reviewed;participants voiced agreement with care plan;participants included;patient   PT Total Evaluation Time   PT Eval, Low Complexity Minutes (97586) 6   Physical Therapy Goals   PT Frequency Daily   PT Predicted Duration/Target Date for Goal Attainment 11/06/24   PT Goals Bed Mobility;Transfers;Gait   PT: Bed Mobility Independent;Supine to/from sit;Within precautions   PT: Transfers Modified independent;Sit to/from stand;Assistive device;Within precautions   PT: Gait Modified independent;Rolling walker;Greater than 200 feet;Within precautions   Interventions   Interventions Quick Adds Therapeutic Activity   Therapeutic Activity   Therapeutic Activities: dynamic activities to improve functional performance Minutes (96491) 24   Symptoms Noted During/After Treatment Increased pain;Fatigue   Treatment Detail/Skilled Intervention Pt supine, agreeable to session. Edu on spinal  precautions. After eval, pt cued to ambulate 300' with FWW and SBA, goal of improving activity tolerance. Demos step through patterning, fair speed, no instability noted. Mild SOB noted by end of bout, reports difficulty taking full breath d/t compression fractures, recovered quickly with rest. Edu on breathing techniques. Pt requesting use of toilet, SBA with FWW, IND for pericares. Extended time discussing IADLs, asking family for assist with heavier tasks at home, answering all pt questions. Returned to supine SBA, able to maintain precautions. Left with alarm on and needs in reach, nurse updated.   PT Discharge Planning   PT Plan progress independence with ambulation and transfers   PT Discharge Recommendation (DC Rec) home with assist   PT Rationale for DC Rec Pt below baseline level of mobility, currently SBA with FWW, mainly limited by pain. Anticipate with IP PT that pt will progress to return home with assist from family/friends for heavier IADLs.   PT Brief overview of current status SBA with FWW   Physical Therapy Time and Intention   Timed Code Treatment Minutes 24   Total Session Time (sum of timed and untimed services) 30

## 2024-11-04 NOTE — CONSULTS
[facility] ACUTE INPATIENT PAIN SERVICE    [facility], [facility], [facility], [facility], [place]   PAIN CONSULT      Assessment/Plan:  Scarlet Chapa is a 69 year old female who was admitted on 11/2/2024.  I was asked by Dr. Luis Carpenter to see the patient for management of her acute on chronic mid back pain in the setting of opioid tolerance. Admitted for an uncontrolled episode of mid back pain without inciting event. History of hypertension, stage 3a CKD, multiple myeloma.     Lumbar and Thoracic MRIs reviewed from 11/02/24:  THORACIC SPINE MRI:  1.  Unchanged height loss associated with vertebral body fractures at T5, T6, T8, T10, and T12 as seen on prior same-day CT study and further detailed above. Mild to moderate patchy STIR hyperintense signal abnormality and associated enhancement are seen   at these levels, particularly at T5, T8, T10, and T12, suggesting subacute processes. Given patient's reported history of multiple myeloma, there is a high concern for pathologic fractures.  2.  Abnormal asymmetric enhancement along the posterior aspect of the T10 vertebral body, which appears to extend into the ventral epidural space with circumferential involvement at the T10-T11 level, concerning for soft tissue component of T10 vertebral   body neoplasm. In combination with bony retropulsion of the T10 vertebral body, there is moderate spinal canal stenosis. Abnormal enhancement also extends towards the bilateral T10-T11 neural foramina. Advise close attention on future follow-up imaging.  3.  No cord signal abnormality or pathologic cord enhancement.  4.  Multilevel thoracic spondylosis without associated high-grade spinal canal or neural foraminal stenosis.     LUMBAR SPINE MRI:  1.  Transitional lumbosacral anatomy with sacralized L5 segment.  2.  Findings concerning for extensive myelomatous involvement throughout the lumbar spine and sacrum/bony pelvis as detailed above.  3.  Suspected pathologic  fractures involving the L2 and L3 vertebral bodies with mild patchy STIR hyperintense signal abnormality and enhancement throughout these vertebral bodies, likely representing a subacute process.  4.  Multilevel lumbar spondylosis.  5.  No high-grade spinal canal or neural foraminal stenosis.  6.  No pathologic contrast enhancement involving the distal spinal cord or epidural spaces of the lumbar spine.    [facility] reviewed:  *Oxycodone 5mg #60 tablets, filled 10/29  *Oxycodone 5mg #12 tablets, filled 10/24    Opioids received in the past 24h:  *(2) OxyContin 20mg tablets  *(3) Oxycodone 10mg tablets  *(1) 0.5mg IV hydromorphone    PLAN:  Acute on chronic mid and low back pain in the setting of multiple myeloma. Opioid tolerant.  Multimodal Medication Therapy:   Adjuvants:   - APAP 975mg q6h  - Ketorolac IV 15mg q6h prn  - Topical Lidocaine  - Lorazepam 0.2mg IV q2h prn  Opioids:   - OxyContin 20mg bid  - Oxycodone 5-10mg q4h prn changed to 10-15mg q4h prn  - Discontinue IV hydromorphone 0.3-0.5mg q2h prn  Non-medication interventions- Ice  Follow up /Discharge Recommendations - We recommend prescribing the following at the time of discharge:  Bridge Rx of OxyContin and Oxycodone appropriate          Subjective:  Navya is seen today in her bed alert and oriented in no acute distress. She was engaging in PT during her visit and appears motivated. Reports her pain is currently a 6/10. Reviewed pain medications in detail. She states she does not like the way IV pain medications make her feel and how short lasting it lasts. Agreeable to titrate her orals and discontinue her IV pain medications. She has a follow up scheduled with her Oncologist team through [facility] later this week. She has been happy with their overall care and appreciates the team there.      Denies nausea. Endorses constipation.      Reviewed continuing with current plan, all questions answered.            History   Drug Use No         Tobacco Use       "Smoking status: Never      Smokeless tobacco: Never        Objective:  Vital signs in last 24 hours:  B/P: 166/66, T: 97.6, P: 107, R: 16   Blood pressure (!) 166/66, pulse 107, temperature 97.6  F (36.4  C), temperature source Temporal, resp. rate 16, height 1.626 m (5' 4\"), weight 90.7 kg (200 lb), SpO2 90%.        Review of Systems:   As per subjective, all others negative.    Physical Exam    General: in no apparent distress and non-toxic   HEENT: Head normocephalic atraumatic, oral mucosa moist. Sclerae anicteric  CV: Regular rhythm, normal rate, no murmurs  Resp: No wheezes, no rales or rhonchi, no focal consolidations  GI: Normoactive bowel sounds  Skin: No rashes or lesions  Extremities: No peripheral edema  Psych: Normal affect, mood euthymic  Neuro: Grossly normal          Imaging:  Personally Reviewed.    Results for orders placed or performed during the hospital encounter of 11/02/24   CT Thoracic Spine w/o Contrast    Impression    IMPRESSION:    1.  Multilevel compression fractures at T5, T6, T8, T10 and T12 which are age indeterminate, however the T6, T8, T10 and T12 fractures appear acute or subacute. This could be confirmed with a thoracic spine MRI.   2.  No other evidence of acute fracture or subluxation of the thoracic spine by CT imaging.  3.  Thoracic kyphosis.   MR Thoracic Spine w/o & w Contrast    Impression    IMPRESSION:    THORACIC SPINE MRI:  1.  Unchanged height loss associated with vertebral body fractures at T5, T6, T8, T10, and T12 as seen on prior same-day CT study and further detailed above. Mild to moderate patchy STIR hyperintense signal abnormality and associated enhancement are seen   at these levels, particularly at T5, T8, T10, and T12, suggesting subacute processes. Given patient's reported history of multiple myeloma, there is a high concern for pathologic fractures.  2.  Abnormal asymmetric enhancement along the posterior aspect of the T10 vertebral body, which appears to " extend into the ventral epidural space with circumferential involvement at the T10-T11 level, concerning for soft tissue component of T10 vertebral   body neoplasm. In combination with bony retropulsion of the T10 vertebral body, there is moderate spinal canal stenosis. Abnormal enhancement also extends towards the bilateral T10-T11 neural foramina. Advise close attention on future follow-up imaging.  3.  No cord signal abnormality or pathologic cord enhancement.  4.  Multilevel thoracic spondylosis without associated high-grade spinal canal or neural foraminal stenosis.    LUMBAR SPINE MRI:  1.  Transitional lumbosacral anatomy with sacralized L5 segment.  2.  Findings concerning for extensive myelomatous involvement throughout the lumbar spine and sacrum/bony pelvis as detailed above.  3.  Suspected pathologic fractures involving the L2 and L3 vertebral bodies with mild patchy STIR hyperintense signal abnormality and enhancement throughout these vertebral bodies, likely representing a subacute process.  4.  Multilevel lumbar spondylosis.  5.  No high-grade spinal canal or neural foraminal stenosis.  6.  No pathologic contrast enhancement involving the distal spinal cord or epidural spaces of the lumbar spine.   MR Lumbar Spine w/o & w Contrast    Impression    IMPRESSION:    THORACIC SPINE MRI:  1.  Unchanged height loss associated with vertebral body fractures at T5, T6, T8, T10, and T12 as seen on prior same-day CT study and further detailed above. Mild to moderate patchy STIR hyperintense signal abnormality and associated enhancement are seen   at these levels, particularly at T5, T8, T10, and T12, suggesting subacute processes. Given patient's reported history of multiple myeloma, there is a high concern for pathologic fractures.  2.  Abnormal asymmetric enhancement along the posterior aspect of the T10 vertebral body, which appears to extend into the ventral epidural space with circumferential involvement at  the T10-T11 level, concerning for soft tissue component of T10 vertebral   body neoplasm. In combination with bony retropulsion of the T10 vertebral body, there is moderate spinal canal stenosis. Abnormal enhancement also extends towards the bilateral T10-T11 neural foramina. Advise close attention on future follow-up imaging.  3.  No cord signal abnormality or pathologic cord enhancement.  4.  Multilevel thoracic spondylosis without associated high-grade spinal canal or neural foraminal stenosis.    LUMBAR SPINE MRI:  1.  Transitional lumbosacral anatomy with sacralized L5 segment.  2.  Findings concerning for extensive myelomatous involvement throughout the lumbar spine and sacrum/bony pelvis as detailed above.  3.  Suspected pathologic fractures involving the L2 and L3 vertebral bodies with mild patchy STIR hyperintense signal abnormality and enhancement throughout these vertebral bodies, likely representing a subacute process.  4.  Multilevel lumbar spondylosis.  5.  No high-grade spinal canal or neural foraminal stenosis.  6.  No pathologic contrast enhancement involving the distal spinal cord or epidural spaces of the lumbar spine.        Lab Results:  Personally Reviewed.   Last Comprehensive Metabolic Panel:  Sodium   Date Value Ref Range Status   11/03/2024 137 135 - 145 mmol/L Final     Potassium   Date Value Ref Range Status   11/03/2024 4.2 3.4 - 5.3 mmol/L Final     Chloride   Date Value Ref Range Status   11/03/2024 102 98 - 107 mmol/L Final     Carbon Dioxide (CO2)   Date Value Ref Range Status   11/03/2024 22 22 - 29 mmol/L Final     Anion Gap   Date Value Ref Range Status   11/03/2024 13 7 - 15 mmol/L Final     Glucose   Date Value Ref Range Status   11/03/2024 94 70 - 99 mg/dL Final     Urea Nitrogen   Date Value Ref Range Status   11/03/2024 14.5 8.0 - 23.0 mg/dL Final     Creatinine   Date Value Ref Range Status   11/03/2024 0.84 0.51 - 0.95 mg/dL Final     GFR Estimate   Date Value Ref Range  "Status   11/03/2024 76 >60 mL/min/1.73m2 Final     Comment:     eGFR calculated using 2021 CKD-EPI equation.     Calcium   Date Value Ref Range Status   11/03/2024 9.7 8.8 - 10.4 mg/dL Final     Comment:     Reference intervals for this test were updated on 7/16/2024 to reflect our healthy population more accurately. There may be differences in the flagging of prior results with similar values performed with this method. Those prior results can be interpreted in the context of the updated reference intervals.        UA: No results found for: \"UAMP\", \"UBARB\", \"BENZODIAZEUR\", \"UCANN\", \"UCOC\", \"OPIT\", \"UPCP\"           Please see A&P for additional details of medical decision making.    Leonard Menchaca PA-C  Acute Care Pain Management  Team  Hours of pain coverage Mon-Fri 8-1600, afterhours please call the house officer    [facility] ([place], [place], [place], [place])   Page via [place] web console -Click for [facility]            "

## 2024-11-04 NOTE — CONSULTS
RADIATION THERAPY INPATIENT CONSULTATION  DATE OF CONSULTATION: 2024  PATIENT NAME: Scarlet Chapa  : 1955   HEALTHCARE PROVIDERS: Eliu Garcia MD, Chase Eller NP  CHIEF COMPLAINT: Multiple myeloma, several compression fractures in the thoracic and lumbar spine without cord compression, uncontrollable thoracic and lumbar pain.  HISTORY OF PRESENT ILLNESS: I have personally reviewed the following tests, imaging, and medical records.  Scarlet Chapa is a 69 y.o. female with multiple comorbidities including, but not limited to hypertension, hyperlipidemia, osteoporosis, CKD Stage 3A, and obesity.  2024: She presented to Urgent Care because of low back pain bilaterally. She was referred for PT with thought that she had SI inflammation with some improvement of the pain   24: Presented to ED with severe back pain after bending over to touch her toes and was found to have multiple age indeterminate thoracic compression fractures on X-rays. She was treated with opioids and referred to [facility].   24: Orthopedics Consult [facility]: Referred for PT and MRI.   10/05/24: MR Thoracic Spine 80% compression fracture of T8 with retropulsed bone. Diffuse marrow decreased T1 signal with malignancy in differential. Known spondylolithiesis involving T1-4. Chronic superior compression fracture L2. .   10/29/24: Hematology consultation: Markedly elevated lambda FLC concerning for plasma cell malignancy / myeloma, PET was requested and pain medication was prescribed taking oxycodone 10 mg every 4 hours and 4 g of Tylenol daily.  10/31/2024: Bone marrow biopsy showed plasma cell myeloma with 35-45% bone marrow involvement and 65% of bone marrow cellularity, M spike with increased lambda light chains  2024: Presented to the ED via EMS because of back pain rating 10/10. Denies any neurologic deficits.  2024: THORACIC SPINE MRI:  1. Unchanged height loss associated with  vertebral body fractures at T5, T6, T8, T10, and T12 as seen on prior same-day CT study and further detailed above. Mild to moderate patchy STIR hyperintense signal abnormality and associated enhancement are seen near syncope at these levels, particularly at T5, T8, T10, and T12, suggesting subacute processes. Given patient's reported history of multiple myeloma, there is a high concern for pathologic fractures.  2. Abnormal asymmetric enhancement along the posterior aspect of the T10 vertebral body, which appears to extend into the ventral epidural space with circumferential involvement at the T10-T11 level, concerning for soft tissue component of T10 vertebral body neoplasm. In combination with bony retropulsion of the T10 vertebral body, there is moderate spinal canal stenosis. Abnormal enhancement also extends towards the bilateral T10-T11 neural foramina. Advise close attention on future follow-up imaging.  3. No cord signal abnormality or pathologic cord enhancement.  4. Multilevel thoracic spondylosis without associated high-grade spinal canal or neural foraminal stenosis.  11/2/2024: LUMBAR SPINE MRI:  1. Transitional lumbosacral anatomy with sacralized L5 segment.  2. Findings concerning for extensive myelomatous involvement throughout the lumbar spine and sacrum/bony pelvis as detailed above.  3. Suspected pathologic fractures involving the L2 and L3 vertebral bodies with mild patchy STIR hyperintense signal abnormality and enhancement throughout these vertebral bodies, likely representing a subacute process.  4. Multilevel lumbar spondylosis.  5. No high-grade spinal canal or neural foraminal stenosis.  6. No pathologic contrast enhancement involving the distal spinal cord or epidural spaces of the lumbar spine.  11/3/2024: Neurosurgical consultation who mentions concerns for pathologic fractures T5-T6 T8 T10 and T12 L2 and L3. No plan for neurosurgical intervention for pathologic fractures however  recommended evaluation for radiation oncology treatment.  Today, the patient continues to have significant pain mainly in the upper back and lumbar region, denies any neurologic deficits.  We were asked to see the patient for consideration of palliative radiation therapy. She has a medical oncology appointment with Dr. Eliu Garcia on 2024. Plans are to discharge from [facility] in 2 to 4 days.  CANCER RELATED PAIN AND PLAN: The patient rates her thoracic and lumbar pain 4-10/10. Pain management is under the care of medical oncology.  ECOG PERFORMANCE: 3  PAST MEDICAL HISTORY: Cancer (recent dx of multiple myeloma) , CKD, stage 3a, Depression, Has NOT received flu vaccine for 5260-1844 year, Hyperlipidemia, Hypertension, Insomnia, Nephrolithiasis, Nuclear senile cataract of both eyes, Osteoarthritis, Osteoporosis, Perforated ear drum, Significant claustrophobia.  PAST SURGICAL HISTORY: BL Hip arthroplasty ( and ), colonoscopy on 2017, laser lithrotripsy and ureter stent placement.   FAMILY HISTORY: Father is alive - skin cancer removal. Paternal Aunt is  at age 80 - breast. Paternal Aunt is  at age 80 - breast.  MEDICATIONS: Acetaminophen 1 Tablet (of 975 mg) Tablet Oral q 6 hours   Amlodipine Besylate 1 Tablet (of 5 mg) Tablet Oral q.d.   Hydromorphone HCl 1 Dose(s) (of 0.5 mg/mL) Injection q 2 hours PRN   Ketorolac Tromethamine 1 Dose(s) (of 15 mg/mL) Injection q 6 hours PRN   Lidocaine 1 Dose(s) Cream Topical q 1 hours PRN   Lovenox 40 mg Injection q.d.   MiraLax Mix-In [place] 1 Packet Pack Oral q.d.   Ondansetron HCl 1 Tablet (of 4 mg) Tablet Oral q 6 hours PRN   Oxycodone HCl 1 Tablet (of 20 mg) Tablet Oral q 12 hours   Protonix 1 Tablet (of 40 mg) Pack Oral q.d.   Senna S 1 Tablet Tablet Oral q.d.  ALLERGIES: Gluten, Casein, Dairy  LABS: Pertinent labs reviewed.   IMPLANTED DEVICE: See past medical history.  PREGNANCY STATUS: Menopause age of 57   PREVIOUS RADIATION  THERAPY: See past medical history.  SOCIAL HISTORY: Last screened on 11/4/2024 - Never smoked. Last screened on 11/4/2024 - Drinks occasionally. Patient indicated access to the following support systems: Lives alone, 2 brothers and a sister, No children, and Retired. ETOH rare.  REVIEW OF SYSTEMS:   Constitutional Complains of lack of appetite, fatigue, fever, night sweats and change in weight lost 25 lbs. Denies lethargy, malaise and rigors / chills.   Allergic/Immunologic Denies allergies and adverse reactions.   Head Denies alopecia.   Eyes Denies blurred vision, double vision, lacrimation, night blindness, visual difficulties and photophobia.   ENMT Denies dysphagia, ear pain, epistaxis, esophagitis, problems with hearing, mouth dryness, oral bleeding, otitis, sinusitis, sputum production, stomatitis, altered taste and tinnitus.   Neck Denies neck masses, muscle weakness, neck pain, decreased range of motion and swelling of the neck.   Integumentary Denies alopecia, blistering, bruising, dry skin, facial burning, nail changes, photosensitivity, pruritus, rash and urticaria.   Breasts Denies breast masses, nipple discharge, nipple inversion and pain.   Cardiovascular Denies arrhythmias, chest pain, dyspnea, edema, implantable cardiac device and palpitations.   Respiratory Complains of dyspnea and wheezing. Denies cough, hemoptysis, hiccoughs and pleuritic chest pain.   Gastrointestinal Complains of constipation. Denies abdominal pain, change in bowel habits, diarrhea, heartburn / dyspepsia, hematemesis, hematochezia, hemorrhoids, melena / GI bleeding, nausea, pain / cramping, satiety and vomiting.   Genitourinary (F) Denies dysuria, frequency, genital masses, hematuria, incontinence, nocturia, renal stone disease, problems with sexual function, urgency, urine color change, vaginal discharge / bleeding and vaginal spotting.   Musculoskeletal Complains of arthritis, bone pain, joint pain, muscle weakness and  decreased range of motion.   Neurologic Denies disorientation, dizziness, abnormal gait, headaches, insomnia, memory loss, motor weakness, sensory problems, paralysis, seizure and stroke.   Psychiatric Complains of depression. Denies delusions, hallucinations, mood swings and euphoria.   Endocrine Denies diabetes, hot flashes, menstrual irregularities and thyroid disease.   Hematologic/Lymphatic Denies anemia and tender or enlarged lymph nodes.   PHYSICAL EXAMINATION: Performed on 11/4/2024 10:01 AM  BMI - 34.33 (HIGH) - Height - 64 in - Weight - 200 lbs - Temperature - 97.5 F - Pulse - 112 / min (HIGH) -   Pain - 5 - Fall Risk - yes - BP - 161/ 96 mm (hg) (HIGH/) - ;  The patient is in a hospital wheelchair, she is alert, oriented recall is intact, speech is fluent, in no acute distress. HEENT: pupils symmetrical, symmetrical facial movements no scleral icterus. Neck, supraclavicular regions and axillae: without palpable lymph node enlargement or masses. Chest: heart, regular rate and rhythm. Lungs: clear to auscultation bilaterally. Back: There is point tenderness at the level of T12 and in the lumbar region. Abdomen: soft, nontender, no palpable masses or liver enlargement. Groins: no palpable lymph node enlargements. Extremities: no clubbing or edemas. Neurologic: cranial nerves II through XII are grossly intact. Muscle strength, tone are normal.  IMPRESSION AND PLAN: Imaging and pathology studies have been discussed with the patient.  69-year-old female with recently diagnosed multiple myeloma, presented to the ED with uncontrollable back pain. Thoracic and lumbar MRI performed on 11/2/2024 showed no significant difference compared to the previous MRIs at [facility], there are compression fractures of multiple vertebral bodies, mostly concerning for T5, T6, T8, T10 and T12, L2 and L3.  I had a long discussion with the patient regarding the nature of her multiple myeloma and treatment options. I explained to her  that even though the back bone of the treatment for her multiple myeloma is systemic therapy, frequently radiation therapy is given for palliative purposes and I agree with neurosurgery with the fact that she should consider palliative treatment with radiation therapy. I told her the goal of the treatment in her case will be pain control and prevent further destruction of the bone by tumor. I reviewed in detail the logistics, short-term side effects and the potential long-term complications of radiation therapy to the thoracic and lumbar spine, that includes, but not limited to, skin irritation, fatigue, difficulty swallowing, loose stools. At the end of our discussion today, the patient stated having a good understanding of all issues involved and expressed her wishes to proceed with treatment.    PLAN: The patient was simulated in order to star treatment today. The plan is palliative radiation therapy to the vertebral bodies from T8- L3. 2000 cGy in 5 fractions.   I discussed her case with her medical oncologist Dr. Eliu Garcia who agrees with the plan.  Thank you very much for asking me to see Scarlet Chapa in radiation oncology consultation. Please do not hesitate to call if you have any questions or concerns regarding this patient.  I spent 45 minutes face-to-face with the patient. 25 minutes were spent reviewing imaging test results and other medical records.  (Portions of this document may have been recorded using electronic voice recognition technology.)  Sincerely,    Davin Wade MD, Radiation Oncologist 11/4/2024 3:43:48 PM   Electronically Signed and Approved  Signature Derived from Controlled Access Password

## 2024-11-05 ENCOUNTER — APPOINTMENT (OUTPATIENT)
Dept: GENERAL RADIOLOGY | Facility: CLINIC | Age: 69
DRG: 542 | End: 2024-11-05
Attending: STUDENT IN AN ORGANIZED HEALTH CARE EDUCATION/TRAINING PROGRAM
Payer: COMMERCIAL

## 2024-11-05 ENCOUNTER — APPOINTMENT (OUTPATIENT)
Dept: PHYSICAL THERAPY | Facility: CLINIC | Age: 69
DRG: 542 | End: 2024-11-05
Payer: COMMERCIAL

## 2024-11-05 LAB
ANION GAP SERPL CALCULATED.3IONS-SCNC: 13 MMOL/L (ref 7–15)
ATRIAL RATE - MUSE: 106 BPM
BASOPHILS # BLD AUTO: 0 10E3/UL (ref 0–0.2)
BASOPHILS NFR BLD AUTO: 0 %
BUN SERPL-MCNC: 18.5 MG/DL (ref 8–23)
CALCIUM SERPL-MCNC: 10.3 MG/DL (ref 8.8–10.4)
CHLORIDE SERPL-SCNC: 97 MMOL/L (ref 98–107)
CREAT SERPL-MCNC: 0.83 MG/DL (ref 0.51–0.95)
CREAT SERPL-MCNC: 1.02 MG/DL (ref 0.51–0.95)
DIASTOLIC BLOOD PRESSURE - MUSE: NORMAL MMHG
EGFRCR SERPLBLD CKD-EPI 2021: 60 ML/MIN/1.73M2
EGFRCR SERPLBLD CKD-EPI 2021: 77 ML/MIN/1.73M2
EOSINOPHIL # BLD AUTO: 0.2 10E3/UL (ref 0–0.7)
EOSINOPHIL NFR BLD AUTO: 4 %
ERYTHROCYTE [DISTWIDTH] IN BLOOD BY AUTOMATED COUNT: 15 % (ref 10–15)
GLUCOSE BLDC GLUCOMTR-MCNC: 102 MG/DL (ref 70–99)
GLUCOSE SERPL-MCNC: 95 MG/DL (ref 70–99)
HCO3 SERPL-SCNC: 23 MMOL/L (ref 22–29)
HCT VFR BLD AUTO: 31.2 % (ref 35–47)
HGB BLD-MCNC: 10.7 G/DL (ref 11.7–15.7)
IMM GRANULOCYTES # BLD: 0.1 10E3/UL
IMM GRANULOCYTES NFR BLD: 1 %
INTERPRETATION ECG - MUSE: NORMAL
LYMPHOCYTES # BLD AUTO: 0.6 10E3/UL (ref 0.8–5.3)
LYMPHOCYTES NFR BLD AUTO: 14 %
MCH RBC QN AUTO: 31.3 PG (ref 26.5–33)
MCHC RBC AUTO-ENTMCNC: 34.3 G/DL (ref 31.5–36.5)
MCV RBC AUTO: 91 FL (ref 78–100)
MONOCYTES # BLD AUTO: 0.5 10E3/UL (ref 0–1.3)
MONOCYTES NFR BLD AUTO: 11 %
NEUTROPHILS # BLD AUTO: 2.9 10E3/UL (ref 1.6–8.3)
NEUTROPHILS NFR BLD AUTO: 69 %
NRBC # BLD AUTO: 0 10E3/UL
NRBC BLD AUTO-RTO: 0 /100
NT-PROBNP SERPL-MCNC: 52 PG/ML (ref 0–900)
P AXIS - MUSE: 58 DEGREES
PLATELET # BLD AUTO: 218 10E3/UL (ref 150–450)
POTASSIUM SERPL-SCNC: 3.9 MMOL/L (ref 3.4–5.3)
PR INTERVAL - MUSE: 152 MS
QRS DURATION - MUSE: 92 MS
QT - MUSE: 342 MS
QTC - MUSE: 454 MS
R AXIS - MUSE: 20 DEGREES
RBC # BLD AUTO: 3.42 10E6/UL (ref 3.8–5.2)
SODIUM SERPL-SCNC: 133 MMOL/L (ref 135–145)
SYSTOLIC BLOOD PRESSURE - MUSE: NORMAL MMHG
T AXIS - MUSE: 2 DEGREES
TROPONIN T SERPL HS-MCNC: 13 NG/L
TROPONIN T SERPL HS-MCNC: 14 NG/L
VENTRICULAR RATE- MUSE: 106 BPM
WBC # BLD AUTO: 4.1 10E3/UL (ref 4–11)

## 2024-11-05 PROCEDURE — 250N000011 HC RX IP 250 OP 636: Performed by: STUDENT IN AN ORGANIZED HEALTH CARE EDUCATION/TRAINING PROGRAM

## 2024-11-05 PROCEDURE — 250N000013 HC RX MED GY IP 250 OP 250 PS 637: Performed by: STUDENT IN AN ORGANIZED HEALTH CARE EDUCATION/TRAINING PROGRAM

## 2024-11-05 PROCEDURE — 83880 ASSAY OF NATRIURETIC PEPTIDE: CPT | Performed by: STUDENT IN AN ORGANIZED HEALTH CARE EDUCATION/TRAINING PROGRAM

## 2024-11-05 PROCEDURE — 36415 COLL VENOUS BLD VENIPUNCTURE: CPT | Performed by: STUDENT IN AN ORGANIZED HEALTH CARE EDUCATION/TRAINING PROGRAM

## 2024-11-05 PROCEDURE — 85004 AUTOMATED DIFF WBC COUNT: CPT | Performed by: STUDENT IN AN ORGANIZED HEALTH CARE EDUCATION/TRAINING PROGRAM

## 2024-11-05 PROCEDURE — 97116 GAIT TRAINING THERAPY: CPT | Mod: GP

## 2024-11-05 PROCEDURE — 250N000011 HC RX IP 250 OP 636: Performed by: INTERNAL MEDICINE

## 2024-11-05 PROCEDURE — 80048 BASIC METABOLIC PNL TOTAL CA: CPT | Performed by: STUDENT IN AN ORGANIZED HEALTH CARE EDUCATION/TRAINING PROGRAM

## 2024-11-05 PROCEDURE — 250N000013 HC RX MED GY IP 250 OP 250 PS 637: Performed by: INTERNAL MEDICINE

## 2024-11-05 PROCEDURE — 97530 THERAPEUTIC ACTIVITIES: CPT | Mod: GP

## 2024-11-05 PROCEDURE — 250N000013 HC RX MED GY IP 250 OP 250 PS 637: Performed by: PHYSICIAN ASSISTANT

## 2024-11-05 PROCEDURE — 99232 SBSQ HOSP IP/OBS MODERATE 35: CPT | Performed by: PHYSICIAN ASSISTANT

## 2024-11-05 PROCEDURE — 71045 X-RAY EXAM CHEST 1 VIEW: CPT

## 2024-11-05 PROCEDURE — 84484 ASSAY OF TROPONIN QUANT: CPT | Performed by: STUDENT IN AN ORGANIZED HEALTH CARE EDUCATION/TRAINING PROGRAM

## 2024-11-05 PROCEDURE — 99233 SBSQ HOSP IP/OBS HIGH 50: CPT | Performed by: STUDENT IN AN ORGANIZED HEALTH CARE EDUCATION/TRAINING PROGRAM

## 2024-11-05 PROCEDURE — 120N000001 HC R&B MED SURG/OB

## 2024-11-05 RX ORDER — HYDROXYZINE HYDROCHLORIDE 50 MG/1
50 TABLET, FILM COATED ORAL EVERY 8 HOURS PRN
Status: DISCONTINUED | OUTPATIENT
Start: 2024-11-05 | End: 2024-11-10 | Stop reason: HOSPADM

## 2024-11-05 RX ORDER — HYDROXYZINE HYDROCHLORIDE 25 MG/1
25 TABLET, FILM COATED ORAL EVERY 8 HOURS PRN
Status: DISCONTINUED | OUTPATIENT
Start: 2024-11-05 | End: 2024-11-10 | Stop reason: HOSPADM

## 2024-11-05 RX ORDER — FUROSEMIDE 10 MG/ML
20 INJECTION INTRAMUSCULAR; INTRAVENOUS ONCE
Status: COMPLETED | OUTPATIENT
Start: 2024-11-05 | End: 2024-11-05

## 2024-11-05 RX ADMIN — FUROSEMIDE 20 MG: 10 INJECTION, SOLUTION INTRAMUSCULAR; INTRAVENOUS at 19:31

## 2024-11-05 RX ADMIN — ENOXAPARIN SODIUM 40 MG: 40 INJECTION SUBCUTANEOUS at 19:30

## 2024-11-05 RX ADMIN — ACETAMINOPHEN 975 MG: 325 TABLET, FILM COATED ORAL at 08:44

## 2024-11-05 RX ADMIN — OXYCODONE HYDROCHLORIDE 15 MG: 10 TABLET ORAL at 08:45

## 2024-11-05 RX ADMIN — PANTOPRAZOLE SODIUM 40 MG: 40 TABLET, DELAYED RELEASE ORAL at 05:50

## 2024-11-05 RX ADMIN — OXYCODONE HYDROCHLORIDE 20 MG: 10 TABLET, FILM COATED, EXTENDED RELEASE ORAL at 18:30

## 2024-11-05 RX ADMIN — POLYETHYLENE GLYCOL 3350 17 G: 17 POWDER, FOR SOLUTION ORAL at 19:30

## 2024-11-05 RX ADMIN — SENNOSIDES 1 TABLET: 8.6 TABLET, FILM COATED ORAL at 19:30

## 2024-11-05 RX ADMIN — HYDRALAZINE HYDROCHLORIDE 25 MG: 25 TABLET ORAL at 13:49

## 2024-11-05 RX ADMIN — OXYCODONE HYDROCHLORIDE 15 MG: 10 TABLET ORAL at 00:06

## 2024-11-05 RX ADMIN — OXYCODONE HYDROCHLORIDE 20 MG: 10 TABLET, FILM COATED, EXTENDED RELEASE ORAL at 05:50

## 2024-11-05 RX ADMIN — ACETAMINOPHEN 975 MG: 325 TABLET, FILM COATED ORAL at 02:39

## 2024-11-05 RX ADMIN — POLYETHYLENE GLYCOL 3350 17 G: 17 POWDER, FOR SOLUTION ORAL at 08:46

## 2024-11-05 RX ADMIN — LOSARTAN POTASSIUM 100 MG: 100 TABLET, FILM COATED ORAL at 08:44

## 2024-11-05 RX ADMIN — OXYCODONE HYDROCHLORIDE 15 MG: 10 TABLET ORAL at 13:49

## 2024-11-05 RX ADMIN — AMLODIPINE BESYLATE 5 MG: 5 TABLET ORAL at 08:44

## 2024-11-05 NOTE — PLAN OF CARE
Goal Outcome Evaluation:      Plan of Care Reviewed With: patient      Improving    Outcome Evaluation: PRN oxycodone given. More pain with transfers but once she is standing she has tolerable pain. Plan to DC home TBD      Problem: Adult Inpatient Plan of Care  Goal: Plan of Care Review  Description: The Plan of Care Review/Shift note should be completed every shift.  The Outcome Evaluation is a brief statement about your assessment that the patient is improving, declining, or no change.  This information will be displayed automatically on your shift  note.  Outcome: Progressing  Flowsheets (Taken 11/5/2024 0402)  Outcome Evaluation: PRN oxycodone given. More pain with transfers but once she is standing she has tolerable pain. Plan to DC home TBD  Plan of Care Reviewed With: patient  Goal: Absence of Hospital-Acquired Illness or Injury  Intervention: Identify and Manage Fall Risk  Recent Flowsheet Documentation  Taken 11/4/2024 2057 by Genny Camp RN  Safety Promotion/Fall Prevention:   activity supervised   assistive device/personal items within reach   clutter free environment maintained   mobility aid in reach   nonskid shoes/slippers when out of bed   patient and family education   safety round/check completed  Intervention: Prevent Infection  Recent Flowsheet Documentation  Taken 11/4/2024 2040 by Genny Camp RN  Infection Prevention:   rest/sleep promoted   single patient room provided     Problem: Fall Injury Risk  Goal: Absence of Fall and Fall-Related Injury  Intervention: Identify and Manage Contributors  Recent Flowsheet Documentation  Taken 11/4/2024 2057 by Genny Camp RN  Medication Review/Management: medications reviewed  Intervention: Promote Injury-Free Environment  Recent Flowsheet Documentation  Taken 11/4/2024 2057 by Genny Camp RN  Safety Promotion/Fall Prevention:   activity supervised   assistive device/personal items within reach   clutter free environment maintained    mobility aid in reach   nonskid shoes/slippers when out of bed   patient and family education   safety round/check completed     Problem: Pain Acute  Goal: Optimal Pain Control and Function  Intervention: Prevent or Manage Pain  Recent Flowsheet Documentation  Taken 11/4/2024 2057 by Genny Camp, RN  Medication Review/Management: medications reviewed

## 2024-11-05 NOTE — CONSULTS
"CLINICAL NUTRITION SERVICES  -  ASSESSMENT NOTE      Recommendations:   - Diet as currently ordered (gluten free and casein allergy interface).  Navya knows about the option of small/frequent meals and food from home if able.  Encouraged consistent meals and protein as able.  - We also discussed prn Tanya [employer] (oral supplement that is dairy free).  She plans to look at the ingredients online and try this supplement for another source of nutrition while admitted, if desired.  Wrote name of Tanya Griggs on white board in room + discussed how to order via room service.      MALNUTRITION:  % Weight Loss:  > 7.5% in 3 months (severe malnutrition) --> if admit wt accurate, did confirm trends with Navya  % Intake:  <75% for > 7 days (moderate malnutrition)  Subcutaneous Fat Loss:  None observed overtly, limited exam today  Muscle Loss: Difficult to determine with BMI and limited exam today  Fluid Retention: None documented    Malnutrition Diagnosis: Moderate malnutrition  In Context of:  Acute illness or injury  Chronic illness or disease          REASON FOR ASSESSMENT  Scarlet Chapa is a 69 year old female seen by Registered Dietitian for Malnutrition Screening Tool (MST).     PMH of: MM, back pain, HTN, gluten and dairy sensitivity.      Admit 2/2: Back pain with pathological fractures.    NUTRITION HISTORY  - Information obtained from patient and chart.   - Diet at home: Gluten free and limited dairy (casein) d/t sensitivities.  Not celiac or anaphylactic reaction, but self-selects what she tolerates.  Also describes combination renal and low oxalate diet + avoiding sugar to a degree verbalized.     - Barriers to PO intakes: Low appetite for period of weeks per report.  Consistently eating less than usual during this time and reports wt loss.  States pain meds are \"killing my appetite\".  Notes she is set to meet with an RD vs nutritionist as outpatient to discuss her ongoing struggles with eating, combination diets, and " "emphasizes importance on food first and limited processed ingredients philosophy.  Denies that future consult is via oncology RD.    - Use of oral supplements: None currently.    - Chewing/swallowing issues: Also notes she was told by a provider to self-select soft solids w/ possibility for dysphagia.    - Allergies: Gluten and casein.  Confirmed with Navya she wants these allergies left in place as opposed to self-selecting.       CURRENT NUTRITION ORDERS  Diet Order:     Gluten free (milk/casein allergy combination interfaced)    Current Intake/Tolerance:  Limited timeframe of admit.      Navya reports she's feeling restricted and overwhelmed by all of her diet recs.  She feels she's able to self-select and we also discussed the option of food from home as able + [employer] (oral supplement that is dairy free).  She plans to look at the ingredients online and try this supplement for another source of nutrition while admitted, if desired.  Wrote of [employer] on white board in room.        ANTHROPOMETRICS  Height: 5' 4\"  Weight: 200 lbs 0 oz  Body mass index is 34.33 kg/m .  Weight Status:  Obesity Grade I BMI 30-34.9  Weight History:  Wt Readings from Last 10 Encounters:   11/02/24 90.7 kg (200 lb)   09/13/24 101.5 kg (223 lb 12.3 oz)   10/06/23 96.7 kg (213 lb 1.6 oz)   09/26/23 100.2 kg (221 lb)   09/23/23 98.2 kg (216 lb 8 oz)   08/04/23 99.3 kg (219 lb)   11/01/17 83.9 kg (185 lb)     - No current documentation of edema.   - Method of admit wt not recorded.  Discussed wt trends w/ Navya who does not have a scale at home but gets weighed at appointments.  She noted her weight was as low as 202# most recently at an appointment and she has noticed a change in fit of her clothing.    - Navya reports her usual body weight is closer to 220's and she feels she last weighed that a few months ago.  - Above indicates 10% wt loss in <2 months, if admit wt accurate.    LABS: Reviewed.    MEDICATIONS: Reviewed.    GI: Stooling " "patterns noted w/ recorded BMs 11/02 and 11/03.      SKIN: No current documentation of PI.       ASSESSED NUTRITION NEEDS PER APPROVED PRACTICE GUIDELINES:    Dosing Weight 91 kg   Estimated Energy Needs: >/=20 Kcal/Kg  Justification: minimum maintenance  Estimated Protein Needs: >/=1 g pro/Kg  Justification: preservation of lean body mass, metastatic disease with consideration of baseline CKD  Estimated Fluid Needs: per MD      NUTRITION DIAGNOSIS:  Inadequate oral intake related to low appetite as evidenced by meeting <75% of nutrition needs for period of weeks to months PTA w/ severe wt loss, malnutrition met.     NUTRITION INTERVENTIONS  Recommendations / Nutrition Prescription  See above.    Implementation  Nutrition education: Provided education on above.    Medical Food Supplement: As above.     Nutrition goals:  PO intakes of at least 50-75% of meals, snacks, or supplements TID while admitted.      MONITORING AND EVALUATION:  Progress towards goals will be monitored and evaluated per protocol and Practice Guidelines          Lawanda Ca RDN, , LD  Clinical Dietitian  [facility] Message Group: \"Dietitian [Kishan]\"  Office: [phone]  Pagers:  3rd floor/ICU: [phone]  All other floors: [phone]  Weekend/holiday: [phone]    "

## 2024-11-05 NOTE — PROGRESS NOTES
[facility] ACUTE INPATIENT PAIN SERVICE    [facility], [facility], [facility], [facility], [place]   PAIN PROGRESS      Assessment/Plan:  Scarlet Chapa is a 69 year old female who was admitted on 11/2/2024.  I was asked by Dr. Luis Carpenter to see the patient for management of her acute on chronic mid back pain in the setting of opioid tolerance. Admitted for an uncontrolled episode of mid back pain without inciting event. History of hypertension, stage 3a CKD, multiple myeloma.     Lumbar and Thoracic MRIs reviewed from 11/02/24:  THORACIC SPINE MRI:  1.  Unchanged height loss associated with vertebral body fractures at T5, T6, T8, T10, and T12 as seen on prior same-day CT study and further detailed above. Mild to moderate patchy STIR hyperintense signal abnormality and associated enhancement are seen   at these levels, particularly at T5, T8, T10, and T12, suggesting subacute processes. Given patient's reported history of multiple myeloma, there is a high concern for pathologic fractures.  2.  Abnormal asymmetric enhancement along the posterior aspect of the T10 vertebral body, which appears to extend into the ventral epidural space with circumferential involvement at the T10-T11 level, concerning for soft tissue component of T10 vertebral   body neoplasm. In combination with bony retropulsion of the T10 vertebral body, there is moderate spinal canal stenosis. Abnormal enhancement also extends towards the bilateral T10-T11 neural foramina. Advise close attention on future follow-up imaging.  3.  No cord signal abnormality or pathologic cord enhancement.  4.  Multilevel thoracic spondylosis without associated high-grade spinal canal or neural foraminal stenosis.     LUMBAR SPINE MRI:  1.  Transitional lumbosacral anatomy with sacralized L5 segment.  2.  Findings concerning for extensive myelomatous involvement throughout the lumbar spine and sacrum/bony pelvis as detailed above.  3.  Suspected pathologic  fractures involving the L2 and L3 vertebral bodies with mild patchy STIR hyperintense signal abnormality and enhancement throughout these vertebral bodies, likely representing a subacute process.  4.  Multilevel lumbar spondylosis.  5.  No high-grade spinal canal or neural foraminal stenosis.  6.  No pathologic contrast enhancement involving the distal spinal cord or epidural spaces of the lumbar spine.     [facility] reviewed:  *Oxycodone 5mg #60 tablets, filled 10/29  *Oxycodone 5mg #12 tablets, filled 10/24    Opioids received in the past 24h:  *(2) OxyContin 20mg tablets  *(2) Oxycoodne 15mg tablets  *(2) Oxycodone 10mg tablets  TOTAL MME = 105     PLAN:  Acute on chronic mid and low back pain in the setting of multiple myeloma. Opioid tolerant.  Multimodal Medication Therapy:   Adjuvants:   - APAP 975mg q6h  - Ketorolac IV 15mg q6h prn  - Topical Lidocaine  - Lorazepam 0.2mg IV q2h prn  - Added Hydroxyzine 25-50mg q8h prn  Opioids:   - OxyContin 20mg bid  - Oxycodone 10-15mg q4h prn  Non-medication interventions- Ice  Follow up /Discharge Recommendations - We recommend prescribing the following at the time of discharge:  Bridge Rx of OxyContin and Oxycodone appropriate              Subjective:  Navya is seen today in her bed alert and oriented in no acute distress. Reports her pain is currently a 4/10. IV hydromorphone successfully discontinued yesterday. She states her oral pain medications last longer versus IV and has overall better pain control. Complains of feeling flushed or hot in her skin with the pain medications. Agreeable to add Hydroxyzine to her regimen today. Has been participating in PT with benefit.      Denies nausea. Endorses constipation.      Reviewed continuing with current plan with medication adjustments mentioned above, all questions answered.            History   Drug Use No         Tobacco Use      Smoking status: Never      Smokeless tobacco: Never        Objective:  Vital signs in last 24  "hours:  B/P: 152/63, T: 97.5, P: 103, R: 18   Blood pressure (!) 152/63, pulse 103, temperature 97.5  F (36.4  C), temperature source Temporal, resp. rate 18, height 1.626 m (5' 4\"), weight 90.7 kg (200 lb), SpO2 95%.        Review of Systems:   As per subjective, all others negative.    Physical Exam    General: in no apparent distress and non-toxic   HEENT: Head normocephalic atraumatic, oral mucosa moist. Sclerae anicteric  CV: Regular rhythm, normal rate, no murmurs  Resp: No wheezes, no rales or rhonchi, no focal consolidations  GI: Normoactive bowel sounds  Skin: No rashes or lesions  Extremities: No peripheral edema  Psych: Normal affect, mood euthymic  Neuro: Grossly normal          Imaging:  Personally Reviewed.    Results for orders placed or performed during the hospital encounter of 11/02/24   CT Thoracic Spine w/o Contrast    Impression    IMPRESSION:    1.  Multilevel compression fractures at T5, T6, T8, T10 and T12 which are age indeterminate, however the T6, T8, T10 and T12 fractures appear acute or subacute. This could be confirmed with a thoracic spine MRI.   2.  No other evidence of acute fracture or subluxation of the thoracic spine by CT imaging.  3.  Thoracic kyphosis.   MR Thoracic Spine w/o & w Contrast    Impression    IMPRESSION:    THORACIC SPINE MRI:  1.  Unchanged height loss associated with vertebral body fractures at T5, T6, T8, T10, and T12 as seen on prior same-day CT study and further detailed above. Mild to moderate patchy STIR hyperintense signal abnormality and associated enhancement are seen   at these levels, particularly at T5, T8, T10, and T12, suggesting subacute processes. Given patient's reported history of multiple myeloma, there is a high concern for pathologic fractures.  2.  Abnormal asymmetric enhancement along the posterior aspect of the T10 vertebral body, which appears to extend into the ventral epidural space with circumferential involvement at the T10-T11 level, " concerning for soft tissue component of T10 vertebral   body neoplasm. In combination with bony retropulsion of the T10 vertebral body, there is moderate spinal canal stenosis. Abnormal enhancement also extends towards the bilateral T10-T11 neural foramina. Advise close attention on future follow-up imaging.  3.  No cord signal abnormality or pathologic cord enhancement.  4.  Multilevel thoracic spondylosis without associated high-grade spinal canal or neural foraminal stenosis.    LUMBAR SPINE MRI:  1.  Transitional lumbosacral anatomy with sacralized L5 segment.  2.  Findings concerning for extensive myelomatous involvement throughout the lumbar spine and sacrum/bony pelvis as detailed above.  3.  Suspected pathologic fractures involving the L2 and L3 vertebral bodies with mild patchy STIR hyperintense signal abnormality and enhancement throughout these vertebral bodies, likely representing a subacute process.  4.  Multilevel lumbar spondylosis.  5.  No high-grade spinal canal or neural foraminal stenosis.  6.  No pathologic contrast enhancement involving the distal spinal cord or epidural spaces of the lumbar spine.   MR Lumbar Spine w/o & w Contrast    Impression    IMPRESSION:    THORACIC SPINE MRI:  1.  Unchanged height loss associated with vertebral body fractures at T5, T6, T8, T10, and T12 as seen on prior same-day CT study and further detailed above. Mild to moderate patchy STIR hyperintense signal abnormality and associated enhancement are seen   at these levels, particularly at T5, T8, T10, and T12, suggesting subacute processes. Given patient's reported history of multiple myeloma, there is a high concern for pathologic fractures.  2.  Abnormal asymmetric enhancement along the posterior aspect of the T10 vertebral body, which appears to extend into the ventral epidural space with circumferential involvement at the T10-T11 level, concerning for soft tissue component of T10 vertebral   body neoplasm. In  combination with bony retropulsion of the T10 vertebral body, there is moderate spinal canal stenosis. Abnormal enhancement also extends towards the bilateral T10-T11 neural foramina. Advise close attention on future follow-up imaging.  3.  No cord signal abnormality or pathologic cord enhancement.  4.  Multilevel thoracic spondylosis without associated high-grade spinal canal or neural foraminal stenosis.    LUMBAR SPINE MRI:  1.  Transitional lumbosacral anatomy with sacralized L5 segment.  2.  Findings concerning for extensive myelomatous involvement throughout the lumbar spine and sacrum/bony pelvis as detailed above.  3.  Suspected pathologic fractures involving the L2 and L3 vertebral bodies with mild patchy STIR hyperintense signal abnormality and enhancement throughout these vertebral bodies, likely representing a subacute process.  4.  Multilevel lumbar spondylosis.  5.  No high-grade spinal canal or neural foraminal stenosis.  6.  No pathologic contrast enhancement involving the distal spinal cord or epidural spaces of the lumbar spine.        Lab Results:  Personally Reviewed.   Last Comprehensive Metabolic Panel:  Sodium   Date Value Ref Range Status   11/03/2024 137 135 - 145 mmol/L Final     Potassium   Date Value Ref Range Status   11/03/2024 4.2 3.4 - 5.3 mmol/L Final     Chloride   Date Value Ref Range Status   11/03/2024 102 98 - 107 mmol/L Final     Carbon Dioxide (CO2)   Date Value Ref Range Status   11/03/2024 22 22 - 29 mmol/L Final     Anion Gap   Date Value Ref Range Status   11/03/2024 13 7 - 15 mmol/L Final     Glucose   Date Value Ref Range Status   11/03/2024 94 70 - 99 mg/dL Final     Urea Nitrogen   Date Value Ref Range Status   11/03/2024 14.5 8.0 - 23.0 mg/dL Final     Creatinine   Date Value Ref Range Status   11/04/2024 1.02 (H) 0.51 - 0.95 mg/dL Final     GFR Estimate   Date Value Ref Range Status   11/04/2024 60 (L) >60 mL/min/1.73m2 Final     Comment:     eGFR calculated using  "2021 CKD-EPI equation.     Calcium   Date Value Ref Range Status   11/03/2024 9.7 8.8 - 10.4 mg/dL Final     Comment:     Reference intervals for this test were updated on 7/16/2024 to reflect our healthy population more accurately. There may be differences in the flagging of prior results with similar values performed with this method. Those prior results can be interpreted in the context of the updated reference intervals.        UA: No results found for: \"UAMP\", \"UBARB\", \"BENZODIAZEUR\", \"UCANN\", \"UCOC\", \"OPIT\", \"UPCP\"           Please see A&P for additional details of medical decision making.  Leonard Menchaca PA-C  Acute Care Pain Management  Team  Hours of pain coverage Mon-Fri 8-1600, afterhours please call the house officer    [place] ([place], [place], [place], [place])   Page via [employer] web console -Click for Vocera           "

## 2024-11-05 NOTE — PLAN OF CARE
"Goal Outcome Evaluation:      Plan of Care Reviewed With: patient    Overall Patient Progress: improvingOverall Patient Progress: improving    Outcome Evaluation: Patient alert and oriented. BP high - PRN given, see MAR. Scheduled and PRN oral pain medications given. Patient voiding. Up SBA with walker and gait belt. Had first session of radiation today. One episode of nausea afterwards managed with zofran. Plans pending.        Problem: Adult Inpatient Plan of Care  Goal: Plan of Care Review  Description: The Plan of Care Review/Shift note should be completed every shift.  The Outcome Evaluation is a brief statement about your assessment that the patient is improving, declining, or no change.  This information will be displayed automatically on your shift  note.  Outcome: Progressing  Flowsheets (Taken 11/4/2024 1845)  Outcome Evaluation: Patient alert and oriented. BP high - PRN given, see MAR.  Plan of Care Reviewed With: patient  Overall Patient Progress: improving  Goal: Patient-Specific Goal (Individualized)  Description: You can add care plan individualizations to a care plan. Examples of Individualization might be:  \"Parent requests to be called daily at 9am for status\", \"I have a hard time hearing out of my right ear\", or \"Do not touch me to wake me up as it startles  me\".  Outcome: Progressing  Goal: Absence of Hospital-Acquired Illness or Injury  Outcome: Progressing  Intervention: Identify and Manage Fall Risk  Recent Flowsheet Documentation  Taken 11/4/2024 1116 by Nemo Conner, RN  Safety Promotion/Fall Prevention:   activity supervised   assistive device/personal items within reach   clutter free environment maintained   mobility aid in reach   nonskid shoes/slippers when out of bed   patient and family education   safety round/check completed  Intervention: Prevent Skin Injury  Recent Flowsheet Documentation  Taken 11/4/2024 1116 by Nemo Conner, RN  Body Position: position changed " independently  Skin Protection: adhesive use limited  Intervention: Prevent and Manage VTE (Venous Thromboembolism) Risk  Recent Flowsheet Documentation  Taken 11/4/2024 1116 by Nemo Conner RN  VTE Prevention/Management: (ambulation promoted) SCDs off (sequential compression devices)  Intervention: Prevent Infection  Recent Flowsheet Documentation  Taken 11/4/2024 1116 by Nemo Conner RN  Infection Prevention:   rest/sleep promoted   single patient room provided  Goal: Optimal Comfort and Wellbeing  Outcome: Progressing  Intervention: Monitor Pain and Promote Comfort  Recent Flowsheet Documentation  Taken 11/4/2024 1522 by Nemo Conner RN  Pain Management Interventions: medication (see MAR)  Taken 11/4/2024 1116 by Nemo Conner RN  Pain Management Interventions: medication (see MAR)  Goal: Readiness for Transition of Care  Outcome: Progressing     Problem: Fall Injury Risk  Goal: Absence of Fall and Fall-Related Injury  Outcome: Progressing  Intervention: Identify and Manage Contributors  Recent Flowsheet Documentation  Taken 11/4/2024 1116 by Nemo Conner RN  Medication Review/Management: medications reviewed  Intervention: Promote Injury-Free Environment  Recent Flowsheet Documentation  Taken 11/4/2024 1116 by Nemo Conner RN  Safety Promotion/Fall Prevention:   activity supervised   assistive device/personal items within reach   clutter free environment maintained   mobility aid in reach   nonskid shoes/slippers when out of bed   patient and family education   safety round/check completed     Problem: Pain Acute  Goal: Optimal Pain Control and Function  Outcome: Progressing  Intervention: Develop Pain Management Plan  Recent Flowsheet Documentation  Taken 11/4/2024 1522 by Nemo Conner RN  Pain Management Interventions: medication (see MAR)  Taken 11/4/2024 1116 by Nemo Conner RN  Pain Management Interventions: medication (see MAR)  Intervention: Prevent or Manage Pain  Recent  Flowsheet Documentation  Taken 11/4/2024 1116 by Nemo Conner, RN  Medication Review/Management: medications reviewed

## 2024-11-05 NOTE — PROGRESS NOTES
[facility]    Medicine Progress Note - Hospitalist Service    Date of Admission:  11/2/2024    Assessment & Plan   69-year-old female with recently diagnosed multiple myeloma on bone marrow biopsy but has not been started on treatment yet.  She comes in with severe back pain with multiple pathological fractures T5, 8, 10 and 12 seen on MRI and CT with no cord abnormality.      Severe back pain due to pathological fractures and T5, 8, 10 and 12 and L2/3 vertebral bodies.  No neurosurgical intervention needed as per neurosurgery.  Recommended to consider bracing for comfort, which the patient did not appear to tolerate due to claustrophobia.  T  Started radiation therapy to the spine T8-L3 on 11/4, plan for total of 5 days.  Pain medication as per pain management team.  Started on scheduled Tylenol 975 mg 4 times daily, scheduled OxyContin 20 mg twice daily.  As needed medications include oxycodone 10-15 mg, hydroxyzine and IV ketorolac.  Monitor renal function while on ketorolac.  Plan to DC home once done with radiation therapy.    Multiple myeloma.  Recent diagnosis but has not been started on treatment yet.  Patient had a meeting scheduled with her oncology team on 11/5 which will be rescheduled    Essential hypertension.  Resumed amlodipine and losartan.  Blood pressure still high, due to pain.  As needed hydralazine ordered.    Anemia and mild leukopenia. Likely secondary to multiple myeloma.  Mild hypoxia and diaphoresis on 11/5.  Unclear if diaphoresis is due to type B symptoms.  Patient was normoglycemic.  She denied any chest pain or subjective shortness of breath.  Will check EKG and troponin.  Chest x-ray showed some suspicion of pulmonary vascular congestion and will give Lasix 20 mg IV x 1.  Will also check echocardiogram.  No leukocytosis, fever or focal consolidation to suggest pneumonia.          Diet: Gluten Free Diet  Snacks/Supplements Adult: [place] Standard Oral  "Supplement; With Meals    DVT Prophylaxis: Enoxaparin (Lovenox) SQ  Vargas Catheter: Not present  Lines: None     Cardiac Monitoring: None  Code Status: Full Code      Clinically Significant Risk Factors                   # Hypertension: Noted on problem list           # Obesity: Estimated body mass index is 34.33 kg/m  as calculated from the following:    Height as of this encounter: 1.626 m (5' 4\").    Weight as of this encounter: 90.7 kg (200 lb)., PRESENT ON ADMISSION  # Moderate Malnutrition: based on nutrition assessment, PRESENT ON ADMISSION          Disposition Plan     Medically Ready for Discharge: Anticipated in 2-4 Days             Luis Carpenter MD  Hospitalist Service  [facility]  Securely message with [employer] (more info)  Text page via [employer] Paging/Directory   ______________________________________________________________________    Interval History   Continues to have severe back pain and moving.    Physical Exam   Vital Signs: Temp: 97.5  F (36.4  C) Temp src: Temporal BP: (!) 152/63 Pulse: 103   Resp: 18 SpO2: 92 % O2 Device: Nasal cannula Oxygen Delivery: 1 LPM  Weight: 200 lbs 0 oz    General Appearance: Alert awake and oriented x 3.  Lying in the bed and does not move much due to fear of pain.  Respiratory: Clear to auscultation  Cardiovascular: S1-S2 normal  GI: Soft and nontender  Skin: No rash  Other: No edema      Medical Decision Making       MANAGEMENT DISCUSSED with the following over the past 24 hours: Patient and RN       Data     I have personally reviewed the following data over the past 24 hrs:    4.1  \   10.7 (L)   / 218     N/A N/A N/A /  102 (H)   N/A N/A 1.02 (H) \       Imaging results reviewed over the past 24 hrs:   Recent Results (from the past 24 hours)   XR Chest Port 1 View    Narrative    XR CHEST PORT 1 VIEW   11/5/2024 9:13 AM     HISTORY: Shortness of breath.    COMPARISON: None.      Impression    IMPRESSION: Hypoinflated lungs and mild vascular " congestion. Mild left  lung base atelectasis versus early focal airspace disease. Normal  cardiac silhouette.

## 2024-11-06 ENCOUNTER — APPOINTMENT (OUTPATIENT)
Dept: PHYSICAL THERAPY | Facility: CLINIC | Age: 69
DRG: 542 | End: 2024-11-06
Payer: COMMERCIAL

## 2024-11-06 ENCOUNTER — APPOINTMENT (OUTPATIENT)
Dept: CARDIOLOGY | Facility: CLINIC | Age: 69
DRG: 542 | End: 2024-11-06
Attending: STUDENT IN AN ORGANIZED HEALTH CARE EDUCATION/TRAINING PROGRAM
Payer: COMMERCIAL

## 2024-11-06 LAB — LVEF ECHO: NORMAL

## 2024-11-06 PROCEDURE — 250N000011 HC RX IP 250 OP 636: Performed by: INTERNAL MEDICINE

## 2024-11-06 PROCEDURE — 97116 GAIT TRAINING THERAPY: CPT | Mod: GP

## 2024-11-06 PROCEDURE — 120N000001 HC R&B MED SURG/OB

## 2024-11-06 PROCEDURE — 250N000013 HC RX MED GY IP 250 OP 250 PS 637: Performed by: PHYSICIAN ASSISTANT

## 2024-11-06 PROCEDURE — 250N000013 HC RX MED GY IP 250 OP 250 PS 637: Performed by: NURSE PRACTITIONER

## 2024-11-06 PROCEDURE — 97530 THERAPEUTIC ACTIVITIES: CPT | Mod: GP

## 2024-11-06 PROCEDURE — 99232 SBSQ HOSP IP/OBS MODERATE 35: CPT | Performed by: NURSE PRACTITIONER

## 2024-11-06 PROCEDURE — 97110 THERAPEUTIC EXERCISES: CPT | Mod: GP

## 2024-11-06 PROCEDURE — 250N000013 HC RX MED GY IP 250 OP 250 PS 637: Performed by: INTERNAL MEDICINE

## 2024-11-06 PROCEDURE — 99232 SBSQ HOSP IP/OBS MODERATE 35: CPT | Performed by: STUDENT IN AN ORGANIZED HEALTH CARE EDUCATION/TRAINING PROGRAM

## 2024-11-06 PROCEDURE — 93306 TTE W/DOPPLER COMPLETE: CPT | Mod: 26 | Performed by: INTERNAL MEDICINE

## 2024-11-06 PROCEDURE — 250N000013 HC RX MED GY IP 250 OP 250 PS 637: Performed by: STUDENT IN AN ORGANIZED HEALTH CARE EDUCATION/TRAINING PROGRAM

## 2024-11-06 PROCEDURE — 93306 TTE W/DOPPLER COMPLETE: CPT

## 2024-11-06 RX ORDER — CALCITONIN SALMON 200 [IU]/.09ML
1 SPRAY, METERED NASAL DAILY
Status: DISCONTINUED | OUTPATIENT
Start: 2024-11-06 | End: 2024-11-10 | Stop reason: HOSPADM

## 2024-11-06 RX ORDER — MAGNESIUM OXIDE 400 MG/1
400 TABLET ORAL 2 TIMES DAILY
Status: DISCONTINUED | OUTPATIENT
Start: 2024-11-06 | End: 2024-11-10 | Stop reason: HOSPADM

## 2024-11-06 RX ADMIN — HYDROXYZINE HYDROCHLORIDE 25 MG: 25 TABLET, FILM COATED ORAL at 11:36

## 2024-11-06 RX ADMIN — Medication 400 MG: at 23:05

## 2024-11-06 RX ADMIN — OXYCODONE HYDROCHLORIDE 10 MG: 10 TABLET ORAL at 08:44

## 2024-11-06 RX ADMIN — OXYCODONE HYDROCHLORIDE 20 MG: 10 TABLET, FILM COATED, EXTENDED RELEASE ORAL at 17:18

## 2024-11-06 RX ADMIN — POLYETHYLENE GLYCOL 3350 17 G: 17 POWDER, FOR SOLUTION ORAL at 08:45

## 2024-11-06 RX ADMIN — ONDANSETRON 4 MG: 4 TABLET, ORALLY DISINTEGRATING ORAL at 09:32

## 2024-11-06 RX ADMIN — Medication 400 MG: at 11:35

## 2024-11-06 RX ADMIN — SENNOSIDES 1 TABLET: 8.6 TABLET, FILM COATED ORAL at 20:57

## 2024-11-06 RX ADMIN — POLYETHYLENE GLYCOL 3350 17 G: 17 POWDER, FOR SOLUTION ORAL at 20:57

## 2024-11-06 RX ADMIN — CALCITONIN SALMON 1 SPRAY: 200 SPRAY, METERED NASAL at 11:37

## 2024-11-06 RX ADMIN — OXYCODONE HYDROCHLORIDE 20 MG: 10 TABLET, FILM COATED, EXTENDED RELEASE ORAL at 05:43

## 2024-11-06 RX ADMIN — OXYCODONE HYDROCHLORIDE 10 MG: 10 TABLET ORAL at 01:41

## 2024-11-06 RX ADMIN — LOSARTAN POTASSIUM 100 MG: 100 TABLET, FILM COATED ORAL at 08:45

## 2024-11-06 RX ADMIN — AMLODIPINE BESYLATE 5 MG: 5 TABLET ORAL at 08:44

## 2024-11-06 RX ADMIN — PANTOPRAZOLE SODIUM 40 MG: 40 TABLET, DELAYED RELEASE ORAL at 05:43

## 2024-11-06 RX ADMIN — HYDROXYZINE HYDROCHLORIDE 25 MG: 25 TABLET, FILM COATED ORAL at 21:09

## 2024-11-06 RX ADMIN — ENOXAPARIN SODIUM 40 MG: 40 INJECTION SUBCUTANEOUS at 20:57

## 2024-11-06 NOTE — PLAN OF CARE
Goal Outcome Evaluation:      Plan of Care Reviewed With: patient    Overall Patient Progress: no changeOverall Patient Progress: no change    Outcome Evaluation: SBA with walker and gait belt. PRN oxycodone given. 1L O2 via NC. DC home after radiation therapy.      Problem: Adult Inpatient Plan of Care  Goal: Plan of Care Review  Description: The Plan of Care Review/Shift note should be completed every shift.  The Outcome Evaluation is a brief statement about your assessment that the patient is improving, declining, or no change.  This information will be displayed automatically on your shift  note.  Outcome: Progressing  Flowsheets (Taken 11/6/2024 0317)  Outcome Evaluation: SBA with walker and gait belt. PRN oxycodone given. 1L O2 via NC. DC home after radiation therapy.  Plan of Care Reviewed With: patient  Overall Patient Progress: no change  Goal: Absence of Hospital-Acquired Illness or Injury  Intervention: Identify and Manage Fall Risk  Recent Flowsheet Documentation  Taken 11/5/2024 1953 by Genny Camp RN  Safety Promotion/Fall Prevention:   activity supervised   assistive device/personal items within reach   clutter free environment maintained   mobility aid in reach   nonskid shoes/slippers when out of bed   patient and family education   safety round/check completed  Intervention: Prevent Infection  Recent Flowsheet Documentation  Taken 11/5/2024 1953 by Genny Camp RN  Infection Prevention:   rest/sleep promoted   single patient room provided     Problem: Fall Injury Risk  Goal: Absence of Fall and Fall-Related Injury  Intervention: Identify and Manage Contributors  Recent Flowsheet Documentation  Taken 11/5/2024 1953 by Genny Camp, RN  Medication Review/Management: medications reviewed  Intervention: Promote Injury-Free Environment  Recent Flowsheet Documentation  Taken 11/5/2024 1953 by Genny Camp, RN  Safety Promotion/Fall Prevention:   activity supervised   assistive  device/personal items within reach   clutter free environment maintained   mobility aid in reach   nonskid shoes/slippers when out of bed   patient and family education   safety round/check completed     Problem: Pain Acute  Goal: Optimal Pain Control and Function  Intervention: Prevent or Manage Pain  Recent Flowsheet Documentation  Taken 11/5/2024 1953 by Genny Camp, RN  Medication Review/Management: medications reviewed

## 2024-11-06 NOTE — PLAN OF CARE
"Goal Outcome Evaluation:           Overall Patient Progress: no changeOverall Patient Progress: no change    Outcome Evaluation: Patient alert and oriented. Hypertensive. On 1L O2. Diaphoretic today, improved. Pale today, improved. MD aware, see results. Pain controlled with oral medications. Voiding. Up SBA. Awaiting ultrasound IV placement. Radiation M-F. Plans pending,        Problem: Adult Inpatient Plan of Care  Goal: Plan of Care Review  Description: The Plan of Care Review/Shift note should be completed every shift.  The Outcome Evaluation is a brief statement about your assessment that the patient is improving, declining, or no change.  This information will be displayed automatically on your shift  note.  Outcome: Progressing  Flowsheets (Taken 11/5/2024 0248)  Outcome Evaluation: Patient alert and oriented. Hypertensive. On 1L O2.  Overall Patient Progress: no change  Goal: Patient-Specific Goal (Individualized)  Description: You can add care plan individualizations to a care plan. Examples of Individualization might be:  \"Parent requests to be called daily at 9am for status\", \"I have a hard time hearing out of my right ear\", or \"Do not touch me to wake me up as it startles  me\".  Outcome: Progressing  Goal: Absence of Hospital-Acquired Illness or Injury  Outcome: Progressing  Intervention: Identify and Manage Fall Risk  Recent Flowsheet Documentation  Taken 11/5/2024 0845 by Nemo Conner, RN  Safety Promotion/Fall Prevention:   activity supervised   assistive device/personal items within reach   clutter free environment maintained   mobility aid in reach   nonskid shoes/slippers when out of bed   patient and family education   safety round/check completed  Intervention: Prevent Skin Injury  Recent Flowsheet Documentation  Taken 11/5/2024 0845 by Nemo Conner, RN  Body Position: position changed independently  Skin Protection: adhesive use limited  Intervention: Prevent and Manage VTE (Venous " Thromboembolism) Risk  Recent Flowsheet Documentation  Taken 11/5/2024 0845 by Nemo Conner RN  VTE Prevention/Management: (ambulation promoted) SCDs off (sequential compression devices)  Intervention: Prevent Infection  Recent Flowsheet Documentation  Taken 11/5/2024 0845 by Nemo Conner RN  Infection Prevention:   rest/sleep promoted   single patient room provided  Goal: Optimal Comfort and Wellbeing  Outcome: Progressing  Intervention: Monitor Pain and Promote Comfort  Recent Flowsheet Documentation  Taken 11/5/2024 0844 by Nemo Conner RN  Pain Management Interventions: medication (see MAR)  Goal: Readiness for Transition of Care  Outcome: Progressing     Problem: Fall Injury Risk  Goal: Absence of Fall and Fall-Related Injury  Outcome: Progressing  Intervention: Identify and Manage Contributors  Recent Flowsheet Documentation  Taken 11/5/2024 0845 by Nemo Conner RN  Medication Review/Management: medications reviewed  Intervention: Promote Injury-Free Environment  Recent Flowsheet Documentation  Taken 11/5/2024 0845 by Nemo Conner RN  Safety Promotion/Fall Prevention:   activity supervised   assistive device/personal items within reach   clutter free environment maintained   mobility aid in reach   nonskid shoes/slippers when out of bed   patient and family education   safety round/check completed     Problem: Pain Acute  Goal: Optimal Pain Control and Function  Outcome: Progressing  Intervention: Develop Pain Management Plan  Recent Flowsheet Documentation  Taken 11/5/2024 0844 by Nemo Conner RN  Pain Management Interventions: medication (see MAR)  Intervention: Prevent or Manage Pain  Recent Flowsheet Documentation  Taken 11/5/2024 0845 by Nemo Conner RN  Medication Review/Management: medications reviewed

## 2024-11-06 NOTE — PLAN OF CARE
"Goal Outcome Evaluation:    VSS with elevated BP of 141/ 67. Pain is controlled with 10 mg oxycodone and atarax. 1 soft BM this morning. 1L of 02 NC- SpO2 drops to mid 80's when 02 is removed. Echo & radiation done this AM. SBA with walker & GB. Plan to discharge home after radiation therapy.       Plan of Care Reviewed With: patient    Overall Patient Progress: improvingOverall Patient Progress: improving         Problem: Adult Inpatient Plan of Care  Goal: Plan of Care Review  Description: The Plan of Care Review/Shift note should be completed every shift.  The Outcome Evaluation is a brief statement about your assessment that the patient is improving, declining, or no change.  This information will be displayed automatically on your shift  note.  Outcome: Progressing  Flowsheets (Taken 11/6/2024 1340)  Plan of Care Reviewed With: patient  Overall Patient Progress: improving  Goal: Patient-Specific Goal (Individualized)  Description: You can add care plan individualizations to a care plan. Examples of Individualization might be:  \"Parent requests to be called daily at 9am for status\", \"I have a hard time hearing out of my right ear\", or \"Do not touch me to wake me up as it startles  me\".  Outcome: Progressing  Goal: Absence of Hospital-Acquired Illness or Injury  Outcome: Progressing  Intervention: Identify and Manage Fall Risk  Recent Flowsheet Documentation  Taken 11/6/2024 0844 by Penny Nelson, RN  Safety Promotion/Fall Prevention:   activity supervised   assistive device/personal items within reach   safety round/check completed  Intervention: Prevent Skin Injury  Recent Flowsheet Documentation  Taken 11/6/2024 0844 by Penny Nelson, RN  Body Position: position changed independently  Intervention: Prevent Infection  Recent Flowsheet Documentation  Taken 11/6/2024 0844 by Penny Nelson, RN  Infection Prevention: rest/sleep promoted  Goal: Optimal Comfort and Wellbeing  Outcome: " Progressing  Intervention: Monitor Pain and Promote Comfort  Recent Flowsheet Documentation  Taken 11/6/2024 0926 by Penyn Nelson RN  Pain Management Interventions: declines  Taken 11/6/2024 0844 by Penny Nelson RN  Pain Management Interventions: medication (see MAR)  Goal: Readiness for Transition of Care  Outcome: Progressing     Problem: Fall Injury Risk  Goal: Absence of Fall and Fall-Related Injury  Outcome: Progressing  Intervention: Identify and Manage Contributors  Recent Flowsheet Documentation  Taken 11/6/2024 0844 by Penny Nelson RN  Medication Review/Management: medications reviewed  Intervention: Promote Injury-Free Environment  Recent Flowsheet Documentation  Taken 11/6/2024 0844 by Penny Nelson, RN  Safety Promotion/Fall Prevention:   activity supervised   assistive device/personal items within reach   safety round/check completed     Problem: Pain Acute  Goal: Optimal Pain Control and Function  Outcome: Progressing  Intervention: Develop Pain Management Plan  Recent Flowsheet Documentation  Taken 11/6/2024 0926 by Penny Nelson RN  Pain Management Interventions: declines  Taken 11/6/2024 0844 by Penny Nelson RN  Pain Management Interventions: medication (see MAR)  Intervention: Prevent or Manage Pain  Recent Flowsheet Documentation  Taken 11/6/2024 0844 by Penny Nelson, RN  Medication Review/Management: medications reviewed

## 2024-11-06 NOTE — PROGRESS NOTES
[facility]    Medicine Progress Note - Hospitalist Service    Date of Admission:  11/2/2024    Assessment & Plan   69-year-old female with recently diagnosed multiple myeloma on bone marrow biopsy but has not been started on treatment yet.  She comes in with severe back pain with multiple pathological fractures T5, 8, 10 and 12 seen on MRI and CT with no cord abnormality.      Severe back pain due to pathological fractures and T5, 8, 10 and 12 and L2/3 vertebral bodies.  No neurosurgical intervention needed as per neurosurgery.  Recommended to consider bracing for comfort, patient was seen by orthotics but did not tolerate brace.      Started radiation therapy to the spine T8-L3 on 11/4, plan for total of 5 days.  Pain medication as per pain management team.  Started on scheduled Tylenol 975 mg 4 times daily, scheduled OxyContin 20 mg twice daily.  As needed medications include oxycodone 10-15 mg, hydroxyzine and IV ketorolac.  Monitor renal function while on ketorolac.  Plan to DC home once done with radiation therapy (last day Friday 5/8).    Multiple myeloma.  Recent diagnosis but has not been started on treatment yet.  Patient had a meeting scheduled with her oncology team on 11/5 which will be rescheduled    Essential hypertension.  Resumed amlodipine and losartan.  Blood pressure still high, due to pain.  As needed hydralazine ordered.    Anemia and mild leukopenia. Likely secondary to multiple myeloma.  Mild hypoxia and diaphoresis on 11/5.  Diaphoresis is likely type B symptoms.  Did not have any chest pain or shortness of breath and EKG sinus rhythm with negative troponin x 2.  Chest x-ray showed atelectasis with possible vascular congestion and was given Lasix x 1 but echo showed normal EF and had normal BNP levels.  Suspect she is not taking deep breaths due to pain and encouraged incentive spirometry.          Diet: Gluten Free Diet  Snacks/Supplements Adult: [employer] Standard  "Oral Supplement; With Meals    DVT Prophylaxis: Enoxaparin (Lovenox) SQ  Vargas Catheter: Not present  Lines: None     Cardiac Monitoring: None  Code Status: Full Code      Clinically Significant Risk Factors         # Hyponatremia: Lowest Na = 133 mmol/L in last 2 days, will monitor as appropriate  # Hypochloremia: Lowest Cl = 97 mmol/L in last 2 days, will monitor as appropriate          # Hypertension: Noted on problem list           # Obesity: Estimated body mass index is 34.33 kg/m  as calculated from the following:    Height as of this encounter: 1.626 m (5' 4\").    Weight as of this encounter: 90.7 kg (200 lb)., PRESENT ON ADMISSION  # Moderate Malnutrition: based on nutrition assessment, PRESENT ON ADMISSION          Disposition Plan     Medically Ready for Discharge: Anticipated in 2-4 Days             Luis Carpenter MD  Hospitalist Service  [facility]  Securely message with [employer] (more info)  Text page via [employer] Paging/Directory   ______________________________________________________________________    Interval History   Continues to have severe back pain and moving.    Physical Exam   Vital Signs: Temp: 97.9  F (36.6  C) Temp src: Temporal BP: (!) 141/67 Pulse: 112   Resp: 20 SpO2: 93 % O2 Device: Nasal cannula Oxygen Delivery: 1 LPM  Weight: 200 lbs 0 oz    General Appearance: Alert awake and oriented x 3.  Lying in the bed and does not move much due to fear of pain.  Respiratory: Clear to auscultation  Cardiovascular: S1-S2 normal  GI: Soft and nontender  Skin: No rash  Other: No edema      Medical Decision Making       MANAGEMENT DISCUSSED with the following over the past 24 hours: Patient and RN       Data     I have personally reviewed the following data over the past 24 hrs:    Trop: 13 BNP: N/A       Imaging results reviewed over the past 24 hrs:   Recent Results (from the past 24 hours)   Echocardiogram Complete   Result Value    LVEF  >70%    Narrative    " [id number]  [id number]  [id number]  [id number]^ORLANDO^YONNY     [facility]  Echocardiography Laboratory  [address] [place] [address]     Name: RODRI GARCIA  MRN: [id number]  : 1955  Study Date: 2024 08:20 AM  Age: 69 yrs  Gender: Female  Patient Location: [facility]  Reason For Study: SOB  Ordering Physician: YONNY PERRY  Performed By: MARIBEL Bansal     BSA: 2.0 m2  Height: 64 in  Weight: 200 lb  HR: 102  BP: 141/67 mmHg  ______________________________________________________________________________  Procedure  Complete Portable Echo Adult.  ______________________________________________________________________________  Interpretation Summary     Hyperdynamic left ventricular function.  The visual ejection fraction is >70%.  No regional wall motion abnormalities.  Normal right ventricular size and systolic function.  No significant valve disease.     There is no comparison study available.  ______________________________________________________________________________  Left Ventricle  The left ventricle is normal in size. There is normal left ventricular wall  thickness. Hyperdynamic left ventricular function. The visual ejection  fraction is >70%. Grade I or early diastolic dysfunction. No regional wall  motion abnormalities noted.     Right Ventricle  The right ventricle is normal in size and function.     Atria  Normal left atrial size. Right atrial size is normal. There is no atrial shunt  seen.     Mitral Valve  The mitral valve leaflets appear normal. There is no evidence of stenosis,  fluttering, or prolapse. No systolic anterior motion of the mitral valve.  There is trace mitral regurgitation. There is no mitral valve stenosis.     Tricuspid Valve  Normal tricuspid valve. There is trace tricuspid regurgitation. Right  ventricular systolic pressure could not be approximated due to inadequate  tricuspid regurgitation.     Aortic Valve  The aortic valve is trileaflet.  No aortic regurgitation is present. No aortic  stenosis is present.     Pulmonic Valve  There is trace pulmonic valvular regurgitation.     Vessels  The aortic root is normal size. Normal size ascending aorta. The inferior vena  cava is normal.     Pericardium  There is no pericardial effusion.     Rhythm  The rhythm was sinus tachycardia.  ______________________________________________________________________________  MMode/2D Measurements & Calculations     IVSd: 1.1 cm  LVIDd: 3.7 cm  LVIDs: 2.6 cm  LVPWd: 1.0 cm  FS: 28.4 %  LV mass(C)d: 120.4 grams  LV mass(C)dI: 61.5 grams/m2  Ao root diam: 2.9 cm  LVOT diam: 2.0 cm  LVOT area: 3.1 cm2  Ao root diam index Ht(cm/m): 1.8  Ao root diam index BSA (cm/m2): 1.5  LA Volume (BP): 57.8 ml  LA Volume Index (BP): 29.5 ml/m2     RWT: 0.56  TAPSE: 2.7 cm     Doppler Measurements & Calculations  MV E max nasim: 104.0 cm/sec  MV A max nasim: 129.2 cm/sec  MV E/A: 0.81  MV dec slope: 687.3 cm/sec2  MV dec time: 0.15 sec  PA V2 max: 128.1 cm/sec  PA max P.6 mmHg  PA acc time: 0.08 sec  E/E' av.1  Lateral E/e': 13.9  Medial E/e': 14.3  RV S Nasim: 15.0 cm/sec     ______________________________________________________________________________  Report approved by: Dr Capri Dos Santos 2024 10:06 AM

## 2024-11-06 NOTE — PROGRESS NOTES
Care Management Follow Up    Length of Stay (days): 4    Expected Discharge Date: 11/08/2024     Concerns to be Addressed: adjustment to diagnosis/illness, basic needs, decision making, developmental, medication     Patient plan of care discussed at interdisciplinary rounds: Yes    Anticipated Discharge Disposition:  home with assist    Patient/family educated on Medicare website which has current facility and service quality ratings:  n/a  Education Provided on the Discharge Plan:  yes  Patient/Family in Agreement with the Plan:  tbd    Referrals Placed by CM/SW:  none  Private pay costs discussed: Not applicable    Additional Information:  Received message to call pt's sister Tanya, she is wanting to talk to care management regarding discharge plan. Met with patient at bedside and patient gave permission to speak to sister regarding discharge. Called and spoke with Tanya, she states she [place] of [place] but she is a retired oncology SW and helps coordinate care for patient. Tanya is wanting to know if patient should discharge to TCU or home with home care. Writer explained that currently, PT is recommending patient to discharge to home with assist from family/friends for heavier IADLs. Explained that in order to get TCU auth from [facility] we would need PT and OT to be recommending this. Tanya wondered about home care and writer explained that we can certainly set up home care for patient at discharge if patient is in agreement and if patient is homebound. Tanya plans to chat with pt's bedside nurse and with patient to discuss discharge planning and follow up with care management for any further help with discharge needs.       Next Steps: Follow up with patient and sister Tanya closer to discharge. Monitor for discharge needs.         Josefa Ruby RN  Care Coordinator  [facility]  [phone]

## 2024-11-06 NOTE — PROGRESS NOTES
[facility] ACUTE PAIN SERVICE    [facility], [facility], [facility], [facility], [place]   PAIN Progress Note    Assessment/Plan:  Scarlet Chapa is a 69 year old female who was admitted on 11/2/2024.  I was asked by Dr. Luis Carpenter to see the patient for management of her acute on chronic mid back pain in the setting of opioid tolerance. Admitted for an uncontrolled episode of mid back pain without inciting event. History of hypertension, stage 3a CKD, multiple myeloma, osteoporosis.  MRI supports vertebral fractures multilevel throughout the thoracic spine T5,6,8,10 with enhancement and circumferential involvement at T10-11, acute fracture L2-3 and myelomatous involvement throughout the lumbar spine, pelvic and sacrum.  She has had discussion with her PCP about osteoporosis management and follows regularly with her oncologist.     Opioid risk for unintended respiratory suppression: moderate due to age, CKD,naive opioid status   Cr Cl 71.7 mL/min    Opioid use prior to admission reports recent use of 2 tablet(s) oxycodone 6 x per day prior use 1-2 per day opioid use this past 24 hrs 10 mg Oxycodone (2), OxyContin 20 mg (2)= 90 mg MME. Compared to use yesterday of 115 mg MME  Adjuvants: hydroxyzine 25 mg (1)    MNPMP: reviewed by me 11/06/24    Usage indicates mild to moderate tolerance    PLAN:  Acute on chronic mid and low back pain in the setting of multiple myeloma. Opioid tolerant.  Multimodal Medication Therapy:   Adjuvants:   - APAP 975mg q6h, add magnesium 400 mg bid, calcitonin nasal spray every day x 14 days  - Ketorolac IV 15mg q6h prn  - Topical Lidocaine  - Lorazepam 0.2mg IV q2h prn  - other palliative intent radiation  - trial of soft bracing abd binder (un able to tolerate or manage TLSO)  Opioids:   - OxyContin 20mg bid will cross over to Morphine ER or fentanyl patch as OxyContin not covered under her insurance   - Oxycodone  10-15mg q4h prn  - Discontinue IV hydromorphone 0.3-0.5mg q2h  "prn stop today  Non-medication interventions- Ice  Follow up /Discharge Recommendations - We recommend prescribing the following at the time of discharge:   Long acting agent vs liberalizing short acting opioids TBD  Calcitonin nasal spray x 14 days total  Magnesium 400 mg bid  Intranasal naloxone is recommended for MME >50 mg  Follow up PCP Osteoporosis management Daly Bedoya MD   Follow up with oncologist Malissa Matthews NP        Subjective:  Describes pain as 5/10 and is focal  increasing with activity.  Unable to take deep breath without pain.  Feels pain is under better control , sitting in bed with head up and family present. The patient denies nausea, vomiting, constipation, diarrhea, chest pain, shortness of breath, dizziness, fever, and no BM yet. The patient reports overall improvement .        Principal Problem:  <principal problem not specified>     Patient Active Problem List   Diagnosis    S/P total left hip arthroplasty    Hyperlipidemia    HTN (hypertension)    Stage 3a chronic kidney disease (H)    Kidney stone    Nephrolithiasis    Hydronephrosis with urinary obstruction due to ureteral calculus    Class 2 severe obesity due to excess calories with serious comorbidity in adult (H)    Intractable back pain        Objective:    History   Drug Use No          Tobacco Use      Smoking status: Never      Smokeless tobacco: Never      Vital signs in last 24 hours:  BP (!) 141/67 (BP Location: Left arm)   Pulse 112   Temp 97.9  F (36.6  C) (Temporal)   Resp 20   Ht 1.626 m (5' 4\")   Wt 90.7 kg (200 lb)   SpO2 93%   BMI 34.33 kg/m      Weight:     Vitals:    11/02/24 1325   Weight: 90.7 kg (200 lb)      Weight change:   Body mass index is 34.33 kg/m .    Intake/Output last 3 shifts:  No intake/output data recorded.  Intake/Output this shift:  No intake/output data recorded.    Review of Systems:   As per subjective, all others negative.    Physical Exam:  General Appearance:  Alert, cooperative, no " distress, appears stated age   Head:  Normocephalic, without obvious abnormality, atraumatic   Eyes:  PERRL,normal size, conjunctiva/corneas clear, EOM's intact   Nose: Nares normal, septum midline   Throat: Lips, mucosa, and tongue normal; teeth and gums normal   Neck: Supple, symmetrical, trachea midline   Back:   Symmetric, no curvature, ROM normal   Lungs:   Clear to auscultation bilaterally, respirations unlabored   Chest Wall:  No tenderness or deformity   Heart:  Regular rate and rhythm, S1, S2 normal    Abdomen:   Soft, non-tender, bowel sounds active all four quadrants,  no masses, no organomegaly    Extremities: Extremities normal, atraumatic, no cyanosis or edema   Skin: Skin color, texture, turgor normal, no rashes or lesions   Neurologic: Alert and oriented X 3, Moves all 4 extremities   Psych: Affect is euthymic  aberrant Pain behaviors No     Labs Imaging and Notes Reviewed : Reviewed I have personally reviewed pertinent notes, labs, tests, and radiologic  report and imaging in patient's chart. Yes     Total time spent 35 minutes with greater than 50% in consultation, education and coordination of care.   Also discussed with RN, Hospital Medicine Service, Family, and discharge pharmacist for medication coverage and pain pharmacist .   I discussed and educated the pain plan with the patient regarding:  multimodal pain approach, medications as listed above, intranasal naloxone, advised keeping track of doses, watch for constipation and stool softeners, proper medication disposal, and risk of concurrent use of benzodiazepines and opioids including unintentional overdose and death from overdose.  Treatment plan includes: multimodal pain approach, Hospital Medicine Service for medical management, radiation oncology, orthosis, nutrition.   Elements of Medical Decision Making as described above. High level of decision making required due to 1 or more chronic illness with severe exacerbation, progression, and  side effects of treatment. Acute or chronic illness or injury or surgery. Illness that poses a threat to life or bodily function. High risk therapy including opioids, high risk drug therapy including oral and/or parenteral controlled substances.    Please see A&P for additional details of medical decision making.    Patient is understanding of the plan. All questions and concerns addressed to patient's satisfaction.     Lawanda Cain, APRN,CNP, ACHPN, PGMT-BC  Acute Care Pain Management Program  [facility]   Monday-Friday 8a-4p   Page via Epic or [employer]

## 2024-11-07 ENCOUNTER — TELEPHONE (OUTPATIENT)
Dept: PALLIATIVE MEDICINE | Facility: CLINIC | Age: 69
End: 2024-11-07
Payer: COMMERCIAL

## 2024-11-07 LAB
ANION GAP SERPL CALCULATED.3IONS-SCNC: 16 MMOL/L (ref 7–15)
BASOPHILS # BLD AUTO: 0 10E3/UL (ref 0–0.2)
BASOPHILS NFR BLD AUTO: 0 %
BUN SERPL-MCNC: 22 MG/DL (ref 8–23)
CALCIUM SERPL-MCNC: 10.4 MG/DL (ref 8.8–10.4)
CHLORIDE SERPL-SCNC: 97 MMOL/L (ref 98–107)
CREAT SERPL-MCNC: 0.88 MG/DL (ref 0.51–0.95)
EGFRCR SERPLBLD CKD-EPI 2021: 72 ML/MIN/1.73M2
EOSINOPHIL # BLD AUTO: 0.2 10E3/UL (ref 0–0.7)
EOSINOPHIL NFR BLD AUTO: 6 %
ERYTHROCYTE [DISTWIDTH] IN BLOOD BY AUTOMATED COUNT: 15.2 % (ref 10–15)
GLUCOSE BLDC GLUCOMTR-MCNC: 91 MG/DL (ref 70–99)
GLUCOSE SERPL-MCNC: 90 MG/DL (ref 70–99)
HCO3 SERPL-SCNC: 21 MMOL/L (ref 22–29)
HCT VFR BLD AUTO: 30.8 % (ref 35–47)
HGB BLD-MCNC: 10.6 G/DL (ref 11.7–15.7)
IMM GRANULOCYTES # BLD: 0.1 10E3/UL
IMM GRANULOCYTES NFR BLD: 2 %
LYMPHOCYTES # BLD AUTO: 0.5 10E3/UL (ref 0.8–5.3)
LYMPHOCYTES NFR BLD AUTO: 18 %
MCH RBC QN AUTO: 31.3 PG (ref 26.5–33)
MCHC RBC AUTO-ENTMCNC: 34.4 G/DL (ref 31.5–36.5)
MCV RBC AUTO: 91 FL (ref 78–100)
MONOCYTES # BLD AUTO: 0.4 10E3/UL (ref 0–1.3)
MONOCYTES NFR BLD AUTO: 14 %
NEUTROPHILS # BLD AUTO: 1.7 10E3/UL (ref 1.6–8.3)
NEUTROPHILS NFR BLD AUTO: 60 %
NRBC # BLD AUTO: 0 10E3/UL
NRBC BLD AUTO-RTO: 0 /100
PLATELET # BLD AUTO: 200 10E3/UL (ref 150–450)
PLATELET # BLD AUTO: 202 10E3/UL (ref 150–450)
POTASSIUM SERPL-SCNC: 3.9 MMOL/L (ref 3.4–5.3)
RBC # BLD AUTO: 3.39 10E6/UL (ref 3.8–5.2)
SODIUM SERPL-SCNC: 134 MMOL/L (ref 135–145)
WBC # BLD AUTO: 2.9 10E3/UL (ref 4–11)

## 2024-11-07 PROCEDURE — 85025 COMPLETE CBC W/AUTO DIFF WBC: CPT | Performed by: STUDENT IN AN ORGANIZED HEALTH CARE EDUCATION/TRAINING PROGRAM

## 2024-11-07 PROCEDURE — 36415 COLL VENOUS BLD VENIPUNCTURE: CPT | Performed by: INTERNAL MEDICINE

## 2024-11-07 PROCEDURE — 82565 ASSAY OF CREATININE: CPT | Performed by: INTERNAL MEDICINE

## 2024-11-07 PROCEDURE — 250N000011 HC RX IP 250 OP 636: Performed by: INTERNAL MEDICINE

## 2024-11-07 PROCEDURE — 85049 AUTOMATED PLATELET COUNT: CPT | Performed by: INTERNAL MEDICINE

## 2024-11-07 PROCEDURE — 120N000001 HC R&B MED SURG/OB

## 2024-11-07 PROCEDURE — 250N000013 HC RX MED GY IP 250 OP 250 PS 637: Performed by: NURSE PRACTITIONER

## 2024-11-07 PROCEDURE — 250N000013 HC RX MED GY IP 250 OP 250 PS 637: Performed by: INTERNAL MEDICINE

## 2024-11-07 PROCEDURE — 250N000013 HC RX MED GY IP 250 OP 250 PS 637: Performed by: PHYSICIAN ASSISTANT

## 2024-11-07 PROCEDURE — 80048 BASIC METABOLIC PNL TOTAL CA: CPT | Performed by: STUDENT IN AN ORGANIZED HEALTH CARE EDUCATION/TRAINING PROGRAM

## 2024-11-07 PROCEDURE — 36415 COLL VENOUS BLD VENIPUNCTURE: CPT | Performed by: STUDENT IN AN ORGANIZED HEALTH CARE EDUCATION/TRAINING PROGRAM

## 2024-11-07 PROCEDURE — 250N000013 HC RX MED GY IP 250 OP 250 PS 637: Performed by: STUDENT IN AN ORGANIZED HEALTH CARE EDUCATION/TRAINING PROGRAM

## 2024-11-07 PROCEDURE — 99232 SBSQ HOSP IP/OBS MODERATE 35: CPT | Performed by: NURSE PRACTITIONER

## 2024-11-07 RX ORDER — FENTANYL 12.5 UG/1
12 PATCH TRANSDERMAL
Status: DISCONTINUED | OUTPATIENT
Start: 2024-11-07 | End: 2024-11-10 | Stop reason: HOSPADM

## 2024-11-07 RX ORDER — LIDOCAINE 4 G/G
2 PATCH TOPICAL
Status: DISCONTINUED | OUTPATIENT
Start: 2024-11-07 | End: 2024-11-10 | Stop reason: HOSPADM

## 2024-11-07 RX ADMIN — SENNOSIDES 1 TABLET: 8.6 TABLET, FILM COATED ORAL at 20:02

## 2024-11-07 RX ADMIN — ENOXAPARIN SODIUM 40 MG: 40 INJECTION SUBCUTANEOUS at 20:02

## 2024-11-07 RX ADMIN — OXYCODONE HYDROCHLORIDE 20 MG: 10 TABLET, FILM COATED, EXTENDED RELEASE ORAL at 05:37

## 2024-11-07 RX ADMIN — OXYCODONE HYDROCHLORIDE 10 MG: 10 TABLET ORAL at 13:11

## 2024-11-07 RX ADMIN — FENTANYL 1 PATCH: 12 PATCH TRANSDERMAL at 12:11

## 2024-11-07 RX ADMIN — LOSARTAN POTASSIUM 100 MG: 100 TABLET, FILM COATED ORAL at 09:37

## 2024-11-07 RX ADMIN — Medication 400 MG: at 22:43

## 2024-11-07 RX ADMIN — PANTOPRAZOLE SODIUM 40 MG: 40 TABLET, DELAYED RELEASE ORAL at 05:37

## 2024-11-07 RX ADMIN — ONDANSETRON 4 MG: 4 TABLET, ORALLY DISINTEGRATING ORAL at 08:44

## 2024-11-07 RX ADMIN — POLYETHYLENE GLYCOL 3350 17 G: 17 POWDER, FOR SOLUTION ORAL at 09:38

## 2024-11-07 RX ADMIN — AMLODIPINE BESYLATE 5 MG: 5 TABLET ORAL at 09:35

## 2024-11-07 RX ADMIN — Medication 400 MG: at 20:02

## 2024-11-07 RX ADMIN — LIDOCAINE 2 PATCH: 4 PATCH TOPICAL at 20:02

## 2024-11-07 RX ADMIN — OXYCODONE HYDROCHLORIDE 10 MG: 10 TABLET ORAL at 07:25

## 2024-11-07 RX ADMIN — CALCITONIN SALMON 1 SPRAY: 200 SPRAY, METERED NASAL at 09:41

## 2024-11-07 NOTE — CARE PLAN
Patient has been assessed for Home Oxygen needs. Oxygen readings:    *Pulse oximetry (SpO2) = 94% on room air at rest while awake.    *SpO2 = 80% on room air during activity/with exercise.    *SpO2 improved to 88% on 4liters/minute during activity/with exercise.

## 2024-11-07 NOTE — CONSULTS
"SPIRITUAL HEALTH SERVICES - Consult Note  [facility] Ortho/Spine [facility]  Referral Source/Reason for Visit: [facility] consult.    Summary and Recommendations -  Pt Navya reported that she was diagnosed with multiple myeloma last week.  She named her siblings, a friends, and her Quaker as being part of her support network.  Navya is Mormonism and affiliated with [employer] [facility] in [place].  She declined my offer to contact her Quaker and pleasantly declined prayer in the moment.      Plan: Informed pt how she can request further  support.  This author and other chaplains remain available per pt/family request.     Parag Vaz M.Div., [facility]  Staff     SHS available 24/7 for emergent requests/referrals, either by paging the on-call  or by entering an ASAP/STAT consult in [place], which will also page the on-call .    Assessment    Saw pt Scarlet Chapa per [facility] consult to assess pt's emotional/spiritual resources and needs per her length of stay. .    Patient/Family Understanding of Illness and Goals of Care - Navya reported that she came to the hospital for help with pain control.    Distress and Loss - She became tearful when she reported that she was diagnosed with multiple myeloma.  Navya stated that she has \"accepted\" the diagnosis and that her treatment plan is advancing.    Strengths, Coping, and Resources - She named her two brothers who live locally, her sister who lives out of state, a friend, and her Quaker as being part of her support network.  Her sister, a healthcare SW, text and calls her regularly.    Meaning, Beliefs, and Spirituality -   Navya reported that [facility] is affiliated with [facility] in [place].  She declined prayer in the moment.  Navya reflected that God is present and \"has a plan\" for her.   "

## 2024-11-07 NOTE — DISCHARGE INSTRUCTIONS
[employer] [phone] [email or web address].org    Transit Link [phone] [email or web address].[email or web address]/transit-link    Senior Linkage Line [phone] [email or web address]/[email or web address]-line/    [facility] resources contact information: [facility]- Phone: [phone]; [email or web address]://[email or web address]/[email or web address], https://www.[email or web address]/Transportation/GettingAround/Pages/default.aspx    Some additional resources:      Web: https://CareSimply.org/   Phone: 113.395.9402   Help At Your Door is a nonprofit serving seniors and individuals with disabilities across [place]. Our grocery assistance, home support and transportation services provide help with in-home tasks and chores.    _________________________________________________________________________________________    Meals on Wheels    [facility] call Phone: [phone]             Who is eligible to receive Meals on Wheels?  To anyone - both on a long-term basis and temporarily if you are recovering from surgery or illness.  How much do meals cost? Is financial assistance available? We ask for a modest contribution toward your meals, but the price is based on need. Meals may be authorized as part of [place]'s home- and community-based services Medicaid waiver program, and other subsidy programs can also help cover the cost of meal delivery service. If you have questions about funding options, call us.    Are diabetic and other diet-specific meal options available?  Meals on Wheels happily offers low-sugar, low-sodium meal options, as well as vegetarian options. Simply specify your dietary needs when you sign up for meals.  Are culturally specific meals available?  Kosher, Swiss/Halal meals and other culturally specific meal options are available in many areas. You can specify cultural meal preferences when you sign up for meals online or when you call  "us.    _________________________________________________________________________________________  [facility] Community Services   Web: [email or web address] [email or web address] [email or web address]/services/  Phone: (174) [phone]          Other resources:   call to reach someone who can connect you with more resources!   _________________________________________________________________________________________                _________________________________________________________________________________________    _________________________________________________________________________________________    If your family would like extra support, here is one great resource:   \"Caregiver Assurance\" call [phone](0693)  Customer service hours: Monday - Saturday, 9 a.m. - 7 p.m.              HOMECARE NOTE:   Your doctor has ordered home care to help you after your hospital stay.  The staff will contact you to schedule your first visit.  This service will be provided by [facility].  If you have any question, or have not received a call within 48 hours of discharge, please call them at [phone] *please see homecare quality ratings for all homecares in your area at www.medicare.gov/care-compare  "

## 2024-11-07 NOTE — PLAN OF CARE
Goal Outcome Evaluation:      Plan of Care Reviewed With: patient    Overall Patient Progress: no changeOverall Patient Progress: no change    Outcome Evaluation: Pt tachycardic. Continued 1L O2 via nasal cannula. SBA with WGB. No prns given. Continued radiation therapy. Possible DC home once radiation therapy completed.      Problem: Adult Inpatient Plan of Care  Goal: Plan of Care Review  Description: The Plan of Care Review/Shift note should be completed every shift.  The Outcome Evaluation is a brief statement about your assessment that the patient is improving, declining, or no change.  This information will be displayed automatically on your shift  note.  Outcome: Progressing  Flowsheets (Taken 11/7/2024 0351)  Outcome Evaluation: Pt tachycardic. Continued 1L O2 via nasal cannula. SBA with WGB. No prns given. Continued radiation therapy. Possible DC home once radiation therapy completed.  Plan of Care Reviewed With: patient  Overall Patient Progress: no change  Goal: Absence of Hospital-Acquired Illness or Injury  Intervention: Identify and Manage Fall Risk  Recent Flowsheet Documentation  Taken 11/6/2024 2316 by Genny Camp RN  Safety Promotion/Fall Prevention:   activity supervised   assistive device/personal items within reach   clutter free environment maintained   increased rounding and observation   nonskid shoes/slippers when out of bed   safety round/check completed  Intervention: Prevent Infection  Recent Flowsheet Documentation  Taken 11/6/2024 2316 by Genny Camp RN  Infection Prevention: single patient room provided     Problem: Fall Injury Risk  Goal: Absence of Fall and Fall-Related Injury  Intervention: Identify and Manage Contributors  Recent Flowsheet Documentation  Taken 11/6/2024 2316 by Genny Camp RN  Medication Review/Management: medications reviewed  Intervention: Promote Injury-Free Environment  Recent Flowsheet Documentation  Taken 11/6/2024 2316 by Genny Camp  RN  Safety Promotion/Fall Prevention:   activity supervised   assistive device/personal items within reach   clutter free environment maintained   increased rounding and observation   nonskid shoes/slippers when out of bed   safety round/check completed     Problem: Pain Acute  Goal: Optimal Pain Control and Function  Intervention: Prevent or Manage Pain  Recent Flowsheet Documentation  Taken 11/6/2024 2316 by Genny Camp RN  Medication Review/Management: medications reviewed

## 2024-11-07 NOTE — PROVIDER NOTIFICATION
Provider notified of new onset diaphoresis following walk in halls without associated symptoms. VS and last glucose reported.

## 2024-11-07 NOTE — PLAN OF CARE
"Goal Outcome Evaluation:      Plan of Care Reviewed With: patient    Overall Patient Progress: no changeOverall Patient Progress: no change    Outcome Evaluation: Radiation continued. Requiring 1L via NC. Pain mgmt.    /49 (BP Location: Left arm)   Pulse 105   Temp 97.6  F (36.4  C) (Oral)   Resp 24   Ht 1.626 m (5' 4\")   Wt 90.7 kg (200 lb)   SpO2 93%   BMI 34.33 kg/m      On 1L via NC. Required up to 4L with ambulation.     Pertinent Assessments: A/ox4 but forgetful. 5/5 strength throughout. LS dim in bases. CHOU, shallow breathing noted. Tachycardic. Int nausea. 0-8/10 back pain endorsed. Up SBA with gb/w. Voiding adequately via BR. Diaphoretic 30 minutes after walk without other associated symptoms.   Major Shift Events: Long acting pain regimen switch to Fentanyl patch. PRN oxycodone given twice.   Treatment Plan: Pain team following. Radiation daily. CM for discharge planning with probable HHC. Wean oxygen as able and encourage IS.                                 Problem: Adult Inpatient Plan of Care  Goal: Plan of Care Review  Description: The Plan of Care Review/Shift note should be completed every shift.  The Outcome Evaluation is a brief statement about your assessment that the patient is improving, declining, or no change.  This information will be displayed automatically on your shift  note.  Outcome: Not Progressing  Flowsheets (Taken 11/7/2024 1223)  Outcome Evaluation: Radiation continued. Requiring 1L via NC. Pain mgmt.  Plan of Care Reviewed With: patient  Overall Patient Progress: no change  Goal: Patient-Specific Goal (Individualized)  Description: You can add care plan individualizations to a care plan. Examples of Individualization might be:  \"Parent requests to be called daily at 9am for status\", \"I have a hard time hearing out of my right ear\", or \"Do not touch me to wake me up as it startles  me\".  Outcome: Not Progressing  Goal: Absence of Hospital-Acquired Illness or " Injury  Outcome: Not Progressing  Intervention: Identify and Manage Fall Risk  Recent Flowsheet Documentation  Taken 11/7/2024 0840 by Kajal Giron RN  Safety Promotion/Fall Prevention:   activity supervised   clutter free environment maintained   nonskid shoes/slippers when out of bed   patient and family education   safety round/check completed  Intervention: Prevent Skin Injury  Recent Flowsheet Documentation  Taken 11/7/2024 0840 by Kajal Giron RN  Body Position: position changed independently  Intervention: Prevent Infection  Recent Flowsheet Documentation  Taken 11/7/2024 0840 by Kajal Giron RN  Infection Prevention:   single patient room provided   rest/sleep promoted  Goal: Optimal Comfort and Wellbeing  Outcome: Not Progressing  Goal: Readiness for Transition of Care  Outcome: Not Progressing     Problem: Fall Injury Risk  Goal: Absence of Fall and Fall-Related Injury  Outcome: Not Progressing  Intervention: Identify and Manage Contributors  Recent Flowsheet Documentation  Taken 11/7/2024 0840 by Kajal Giron RN  Medication Review/Management: medications reviewed  Intervention: Promote Injury-Free Environment  Recent Flowsheet Documentation  Taken 11/7/2024 0840 by Kajal Giron RN  Safety Promotion/Fall Prevention:   activity supervised   clutter free environment maintained   nonskid shoes/slippers when out of bed   patient and family education   safety round/check completed     Problem: Pain Acute  Goal: Optimal Pain Control and Function  Outcome: Not Progressing  Intervention: Prevent or Manage Pain  Recent Flowsheet Documentation  Taken 11/7/2024 0840 by Kajal Giron RN  Medication Review/Management: medications reviewed     Problem: Comorbidity Management  Goal: Blood Pressure in Desired Range  Outcome: Not Progressing  Intervention: Maintain Blood Pressure Management  Recent Flowsheet Documentation  Taken 11/7/2024 0840 by Kajal Giron  RN  Medication Review/Management: medications reviewed  Goal: Maintenance of Osteoarthritis Symptom Control  Outcome: Not Progressing  Intervention: Maintain Osteoarthritis Symptom Control  Recent Flowsheet Documentation  Taken 11/7/2024 1137 by Kajal Giron, RN  Assistive Device Utilized:   walker   gait belt  Activity Management: ambulated to bathroom  Taken 11/7/2024 0840 by Kajal Giron, RN  Activity Management: activity adjusted per tolerance  Medication Review/Management: medications reviewed

## 2024-11-07 NOTE — PROGRESS NOTES
[facility] ACUTE PAIN SERVICE    [facility], [facility], [facility], [facility], [place]   PAIN Progress Note    Assessment/Plan:  Scarlet Chapa is a 69 year old female who was admitted on 11/2/2024.  I was asked by Dr. Luis Carpenter to see the patient for management of her acute on chronic mid back pain in the setting of opioid tolerance. Admitted for an uncontrolled episode of mid back pain without inciting event. History of hypertension, stage 3a CKD, multiple myeloma, osteoporosis.  MRI supports vertebral fractures multilevel throughout the thoracic spine T5,6,8,10 with enhancement and circumferential involvement at T10-11, acute fracture L2-3 and myelomatous involvement throughout the lumbar spine, pelvic and sacrum.  She has had discussion with her PCP about osteoporosis management and follows regularly with her oncologist.     Opioid Induced Respiratory Depression Risk Assessment: moderate risk with age CKD naive opioid status ?  CrCl  67.6 mg/mL  The patient's home prior to admission reports recent use of 2 tablet(s) oxycodone 6 x per day prior use 1-2 per day opioid use this past 24 hrs 10 mg Oxycodone (1), OxyContin 20 mg (2)=75 mg MME. Compared to use yesterday of 90 mg MME  Adjuvants: hydroxyzine 25 mg (2) mg daily. In the last 24 hours  MNPMP: reviewed by me 11/06/24    Usage indicates mild to moderate tolerance    PLAN:   1) Pain is consistent with  musculoskeletal mid and low back pain in setting of vertebral fractures multilevel  in the setting of myeloma. Consider the following differentials role of osteoporosis and possible disease progression of myeloma as evidence suggested by MRI lumbar w/w/o contrast.    Multimodal Medication Therapy  Adjuvants:   - Magnesium 400 mg bid, calcitonin nasal spray every day x 14 days consider adding gabapentin  - Ketorolac IV 15mg q6h prn  - Topical Lidocaine patch at hs  - other palliative intent radiation  - trial of soft bracing abd binder (unable to  "tolerate or manage TLSO)  Opioids:   - stop OxyContin 20mg bid transition to fentanyl 12 mcg patch pharmacy liaison for insurance coverage of Fentanyl  - Oxycodone  10-15mg q4h prn  - IV not indicated  Non-medication interventions- Ice  Follow up /Discharge Recommendations - We recommend prescribing the following at the time of discharge:   Fentanyl patch dose TBD  Oxycodone dose TBD  Calcitonin nasal spray x 14 days total  Magnesium 400 mg bid  Intranasal naloxone is recommended for MME >50 mg  Follow up PCP Osteoporosis management Daly Bedoya MD   Follow up with oncologist Malissa Matthews NP  -Opioid prescriber has been Malissa Matthews NP  -Acute pain service will continue to follow   Disposition: home  Prescribing at discharge: hospital medicine     Subjective:  Describes pain as 6/10 and located across the lower back no other radicular features. Feels pain is worse today (did take less opioids and palliative radiation day 3). The patient denies vomiting, constipation, diarrhea, chest pain, dizziness, fever, chills, not dysuria and changes to bowel or bladder habits. The patient reports nausea, shortness of breath (\"air trapped\"), bloating, poor appetite,fatigue, and BM today  .    She reports worry about going home, lives alone and if can make meals care for self etc. Has a [employer] home that may be able to help with check in and meals     Principal Problem:  <principal problem not specified>     Patient Active Problem List   Diagnosis    S/P total left hip arthroplasty    Hyperlipidemia    HTN (hypertension)    Stage 3a chronic kidney disease (H)    Kidney stone    Nephrolithiasis    Hydronephrosis with urinary obstruction due to ureteral calculus    Class 2 severe obesity due to excess calories with serious comorbidity in adult (H)    Intractable back pain        Objective:    History   Drug Use No          Tobacco Use      Smoking status: Never      Smokeless tobacco: Never      Vital signs in last 24 hours:  BP " "(!) 146/55   Pulse 107   Temp 97.3  F (36.3  C) (Temporal)   Resp 18   Ht 1.626 m (5' 4\")   Wt 90.7 kg (200 lb)   SpO2 97%   BMI 34.33 kg/m      Weight:     Vitals:    11/02/24 1325   Weight: 90.7 kg (200 lb)      Weight change:   Body mass index is 34.33 kg/m .    Intake/Output last 3 shifts:  I/O last 3 completed shifts:  In: 240 [P.O.:240]  Out: -   Intake/Output this shift:  No intake/output data recorded.    Review of Systems:   As per subjective, all others negative.    Physical Exam:  General Appearance:  Alert, cooperative, no distress, appears stated age   Head:  Normocephalic, without obvious abnormality, atraumatic   Eyes:  PERRL,normal size, conjunctiva/corneas clear, EOM's intact   Nose: Nares normal, septum midline   Throat: Lips, mucosa, and tongue normal; teeth and gums normal   Neck: Supple, symmetrical, trachea midline   Back:   Symmetric, no curvature, ROM normal   Lungs:   Clear to auscultation bilaterally, respirations shallow effort, pursed lips.    Chest Wall:  No tenderness or deformity   Heart:  Regular rate and rhythm, S1, S2 normal    Abdomen:   Soft, non-tender, bowel sounds active all four quadrants,  no masses, no organomegaly    Extremities: Extremities normal, atraumatic, no cyanosis or edema   Skin: Skin color, texture, turgor normal, no rashes or lesions   Neurologic: Alert and oriented X 3, Moves all 4 extremities   Psych: Affect is anxious aberrant Pain behaviors No     Labs Imaging and Notes Reviewed : Reviewed I have personally reviewed pertinent notes, labs, tests, and radiologic  report and imaging in patient's chart. yes    Total time spent 35 minutes with greater than 50% in consultation, education and coordination of care.   Also discussed with RN, Hospital Medicine Service, discharge pharmacy liaison for medication coverage and care coordination .   I discussed and educated the pain plan with the patient regarding:  multimodal pain approach, medications as listed " above, reviewed anxiety and depression and how this can affect experience of pain, intranasal naloxone, and watch for constipation and stool softeners.  Treatment plan includes: multimodal pain approach, Hospital Medicine Service for medical management, palliative radiation, care coordination.   Elements of Medical Decision Making as described above. High level of decision making required due to 1 or more chronic illness with severe exacerbation, progression, and side effects of treatment. Acute or chronic illness or injury or surgery. Illness that poses a threat to life or bodily function. High risk therapy including opioids, high risk drug therapy including oral and/or parenteral controlled substances.    Please see A&P for additional details of medical decision making.    Patient is understanding of the plan. All questions and concerns addressed to patient's satisfaction.     Lawanda Cain, APRN,CNP, ACHPN, PGMT-BC  Acute Care Pain Management Program  [facility]   Monday-Friday 8a-4p   Page via Epic or Effektif

## 2024-11-07 NOTE — CONSULTS
Patient has Medicare Advantage through [facility].    Oxycontin: Non-formulary.  Patient would have to have to fail or have contraindication to all covered alternatives (morphine ER, [place], fentanyl patch, tramadol ER) for coverage.    Fentanyl patch: Prior auth required and obtained.  --Patient will pay 100% of the first $350 in drugs costs (fills will be $145 remains until this is met)  --Subsequent fills will be $30/mo.    --When total drug costs exceed $5,030, price will increase to a 25% coinsurance, equivalent to $36/mo.    Jaky Dallas  Pharmacy Technician/Liaison, Discharge Pharmacy   [phone] (voice or text)  [email or web address]  Available on [facility] and Teams

## 2024-11-07 NOTE — PROGRESS NOTES
Care Management Follow Up    Length of Stay (days): 5    Expected Discharge Date: 11/08/2024     Concerns to be Addressed: adjustment to diagnosis/illness, basic needs, decision making, developmental, medication     Patient plan of care discussed at interdisciplinary rounds: Yes    Anticipated Discharge Disposition:  Home Care  Anticipated Discharge Services:  Home Care   Anticipated Discharge DME:      Patient/family educated on Medicare website which has current facility and service quality ratings:  Yes  Education Provided on the Discharge Plan:  Yes  Patient/Family in Agreement with the Plan:  Yes    Referrals Placed by CM/SW:    Private pay costs discussed:  private duty care      Handoff Completed: No, handoff not indicated or clinically appropriate    Additional Information:  CM met at bedside with patient who expressed interest in home care services for discharge, has no agency preference. CM sent referral for HH PT/OT/RN/HHA/SW.     Patient also expressed concern that she has limited support at home. CM provided patient with private duty home care list, senior linkage line info, and added resources to AVS as well.     Addendum 1539: [facility] accepted for home care services, contact info added to AVS.    Next Steps: Follow up on home care referral    Maira Mccord RN, BSN  Inpatient Care Coordination  [facility]  [phone]

## 2024-11-07 NOTE — PLAN OF CARE
"Goal Outcome Evaluation:      Plan of Care Reviewed With: patient    Overall Patient Progress: no change     Outcome Evaluation: Hypertensive. Tachycardic.    Cognitively, pt is alert and orientated x4. Afebrile. Oxygen saturation intermittently dips down to 87-89% on 1L. Pt was encouraged to take deep breaths and O2 increased to 97%. IS use also was encouraged. Has diminished breath sounds. Lungs clear to auscultation without wheezes, crackles. Pain in lower back managed with Atarax, schedule OxyContin.    Pt ambulatory with SBA, GB, walker in room. Voiding without difficulty. Discharge plans pending completion of Radiation therapy to spine T8-L3.      Problem: Adult Inpatient Plan of Care  Goal: Plan of Care Review  Description: The Plan of Care Review/Shift note should be completed every shift.  The Outcome Evaluation is a brief statement about your assessment that the patient is improving, declining, or no change.  This information will be displayed automatically on your shift  note.  Outcome: Progressing  Flowsheets (Taken 11/6/2024 5459)  Outcome Evaluation: Hypertensive. Tachycardic.  Plan of Care Reviewed With: patient  Overall Patient Progress: no change  Goal: Patient-Specific Goal (Individualized)  Description: You can add care plan individualizations to a care plan. Examples of Individualization might be:  \"Parent requests to be called daily at 9am for status\", \"I have a hard time hearing out of my right ear\", or \"Do not touch me to wake me up as it startles  me\".  Outcome: Not Progressing  Goal: Absence of Hospital-Acquired Illness or Injury  Outcome: Progressing  Intervention: Identify and Manage Fall Risk  Recent Flowsheet Documentation  Taken 11/6/2024 3268 by Megan Vance, RN  Safety Promotion/Fall Prevention:   activity supervised   assistive device/personal items within reach   safety round/check completed   clutter free environment maintained   increased rounding and observation   increase " visualization of patient   nonskid shoes/slippers when out of bed  Intervention: Prevent Skin Injury  Recent Flowsheet Documentation  Taken 11/6/2024 1558 by Megan Vance RN  Body Position: position changed independently  Skin Protection: adhesive use limited  Intervention: Prevent and Manage VTE (Venous Thromboembolism) Risk  Recent Flowsheet Documentation  Taken 11/6/2024 1558 by Megan Vance RN  VTE Prevention/Management: (ambulation promoted) SCDs off (sequential compression devices)  Intervention: Prevent Infection  Recent Flowsheet Documentation  Taken 11/6/2024 1558 by Megan Vance RN  Infection Prevention: rest/sleep promoted  Goal: Optimal Comfort and Wellbeing  Outcome: Progressing  Intervention: Monitor Pain and Promote Comfort  Recent Flowsheet Documentation  Taken 11/6/2024 1541 by Megan Vance RN  Pain Management Interventions: declines  Goal: Readiness for Transition of Care  Outcome: Progressing     Problem: Fall Injury Risk  Goal: Absence of Fall and Fall-Related Injury  Outcome: Progressing  Intervention: Identify and Manage Contributors  Recent Flowsheet Documentation  Taken 11/6/2024 1558 by Megan Vance RN  Medication Review/Management: medications reviewed  Intervention: Promote Injury-Free Environment  Recent Flowsheet Documentation  Taken 11/6/2024 1558 by Megan Vance RN  Safety Promotion/Fall Prevention:   activity supervised   assistive device/personal items within reach   safety round/check completed   clutter free environment maintained   increased rounding and observation   increase visualization of patient   nonskid shoes/slippers when out of bed     Problem: Pain Acute  Goal: Optimal Pain Control and Function  Outcome: Progressing  Intervention: Develop Pain Management Plan  Recent Flowsheet Documentation  Taken 11/6/2024 1541 by Megan Vance RN  Pain Management Interventions: declines  Intervention: Prevent or Manage Pain  Recent Flowsheet Documentation  Taken  11/6/2024 1558 by Megan Vance, RN  Complementary Therapy: aromatherapy utilized  Medication Review/Management: medications reviewed

## 2024-11-08 ENCOUNTER — APPOINTMENT (OUTPATIENT)
Dept: GENERAL RADIOLOGY | Facility: CLINIC | Age: 69
DRG: 542 | End: 2024-11-08
Attending: STUDENT IN AN ORGANIZED HEALTH CARE EDUCATION/TRAINING PROGRAM
Payer: COMMERCIAL

## 2024-11-08 ENCOUNTER — TRANSFERRED RECORDS (OUTPATIENT)
Dept: HEALTH INFORMATION MANAGEMENT | Facility: CLINIC | Age: 69
End: 2024-11-08

## 2024-11-08 ENCOUNTER — APPOINTMENT (OUTPATIENT)
Dept: PHYSICAL THERAPY | Facility: CLINIC | Age: 69
DRG: 542 | End: 2024-11-08
Payer: COMMERCIAL

## 2024-11-08 LAB
CREAT SERPL-MCNC: 1 MG/DL (ref 0.51–0.95)
EGFRCR SERPLBLD CKD-EPI 2021: 61 ML/MIN/1.73M2

## 2024-11-08 PROCEDURE — 250N000013 HC RX MED GY IP 250 OP 250 PS 637: Performed by: INTERNAL MEDICINE

## 2024-11-08 PROCEDURE — 250N000011 HC RX IP 250 OP 636: Performed by: INTERNAL MEDICINE

## 2024-11-08 PROCEDURE — 99232 SBSQ HOSP IP/OBS MODERATE 35: CPT | Performed by: NURSE PRACTITIONER

## 2024-11-08 PROCEDURE — 99232 SBSQ HOSP IP/OBS MODERATE 35: CPT | Performed by: STUDENT IN AN ORGANIZED HEALTH CARE EDUCATION/TRAINING PROGRAM

## 2024-11-08 PROCEDURE — 71046 X-RAY EXAM CHEST 2 VIEWS: CPT

## 2024-11-08 PROCEDURE — 250N000013 HC RX MED GY IP 250 OP 250 PS 637: Performed by: NURSE PRACTITIONER

## 2024-11-08 PROCEDURE — 250N000013 HC RX MED GY IP 250 OP 250 PS 637: Performed by: STUDENT IN AN ORGANIZED HEALTH CARE EDUCATION/TRAINING PROGRAM

## 2024-11-08 PROCEDURE — 120N000001 HC R&B MED SURG/OB

## 2024-11-08 PROCEDURE — 250N000013 HC RX MED GY IP 250 OP 250 PS 637: Performed by: PHYSICIAN ASSISTANT

## 2024-11-08 PROCEDURE — 97530 THERAPEUTIC ACTIVITIES: CPT | Mod: GP

## 2024-11-08 RX ADMIN — CALCITONIN SALMON 1 SPRAY: 200 SPRAY, METERED NASAL at 08:36

## 2024-11-08 RX ADMIN — OXYCODONE HYDROCHLORIDE 10 MG: 10 TABLET ORAL at 17:07

## 2024-11-08 RX ADMIN — LIDOCAINE 2 PATCH: 4 PATCH TOPICAL at 19:17

## 2024-11-08 RX ADMIN — LOSARTAN POTASSIUM 100 MG: 100 TABLET, FILM COATED ORAL at 08:33

## 2024-11-08 RX ADMIN — Medication 400 MG: at 19:16

## 2024-11-08 RX ADMIN — ENOXAPARIN SODIUM 40 MG: 40 INJECTION SUBCUTANEOUS at 19:14

## 2024-11-08 RX ADMIN — ONDANSETRON 4 MG: 4 TABLET, ORALLY DISINTEGRATING ORAL at 08:33

## 2024-11-08 RX ADMIN — PANTOPRAZOLE SODIUM 40 MG: 40 TABLET, DELAYED RELEASE ORAL at 06:25

## 2024-11-08 RX ADMIN — OXYCODONE HYDROCHLORIDE 10 MG: 10 TABLET ORAL at 06:22

## 2024-11-08 RX ADMIN — AMLODIPINE BESYLATE 5 MG: 5 TABLET ORAL at 08:33

## 2024-11-08 RX ADMIN — SENNOSIDES 1 TABLET: 8.6 TABLET, FILM COATED ORAL at 19:16

## 2024-11-08 NOTE — PLAN OF CARE
"AOX4, Denies pain, Fentanyl patch in place. radiation therapy some time this morning. SBA. 1L o2 NC.  Plan of discharge TBD    Goal Outcome Evaluation:    Plan of Care Reviewed With: patient    Overall Patient Progress: no changeOverall Patient Progress: no change    Outcome Evaluation: Resting in bed. Denies pain. Fentanyl patch in place      Problem: Adult Inpatient Plan of Care  Goal: Plan of Care Review  Description: The Plan of Care Review/Shift note should be completed every shift.  The Outcome Evaluation is a brief statement about your assessment that the patient is improving, declining, or no change.  This information will be displayed automatically on your shift  note.  Outcome: Progressing  Flowsheets (Taken 11/8/2024 0508)  Outcome Evaluation: Resting in bed. Denies pain. Fentanyl patch in place  Plan of Care Reviewed With: patient  Overall Patient Progress: no change  Goal: Patient-Specific Goal (Individualized)  Description: You can add care plan individualizations to a care plan. Examples of Individualization might be:  \"Parent requests to be called daily at 9am for status\", \"I have a hard time hearing out of my right ear\", or \"Do not touch me to wake me up as it startles  me\".  Outcome: Progressing  Goal: Absence of Hospital-Acquired Illness or Injury  Outcome: Progressing  Intervention: Identify and Manage Fall Risk  Recent Flowsheet Documentation  Taken 11/8/2024 0000 by Jenn Gipson, RN  Safety Promotion/Fall Prevention:   activity supervised   assistive device/personal items within reach   clutter free environment maintained   nonskid shoes/slippers when out of bed   safety round/check completed  Intervention: Prevent Infection  Recent Flowsheet Documentation  Taken 11/8/2024 0000 by Jenn Gipson RN  Infection Prevention:   single patient room provided   rest/sleep promoted  Goal: Optimal Comfort and Wellbeing  Outcome: Progressing  Goal: Readiness for Transition of Care  Outcome: " Progressing     Problem: Fall Injury Risk  Goal: Absence of Fall and Fall-Related Injury  Outcome: Progressing  Intervention: Identify and Manage Contributors  Recent Flowsheet Documentation  Taken 11/8/2024 0000 by Jenn Gipson RN  Medication Review/Management: medications reviewed  Intervention: Promote Injury-Free Environment  Recent Flowsheet Documentation  Taken 11/8/2024 0000 by Jenn Gipson RN  Safety Promotion/Fall Prevention:   activity supervised   assistive device/personal items within reach   clutter free environment maintained   nonskid shoes/slippers when out of bed   safety round/check completed     Problem: Pain Acute  Goal: Optimal Pain Control and Function  Outcome: Progressing  Intervention: Prevent or Manage Pain  Recent Flowsheet Documentation  Taken 11/8/2024 0000 by Jenn Gipson RN  Medication Review/Management: medications reviewed     Problem: Comorbidity Management  Goal: Blood Pressure in Desired Range  Outcome: Progressing  Intervention: Maintain Blood Pressure Management  Recent Flowsheet Documentation  Taken 11/8/2024 0000 by Jenn Gipson RN  Medication Review/Management: medications reviewed  Goal: Maintenance of Osteoarthritis Symptom Control  Outcome: Progressing  Intervention: Maintain Osteoarthritis Symptom Control  Recent Flowsheet Documentation  Taken 11/8/2024 0000 by Jenn Gipson RN  Medication Review/Management: medications reviewed

## 2024-11-08 NOTE — PLAN OF CARE
Goal Outcome Evaluation:      Plan of Care Reviewed With: patient    Overall Patient Progress: no changeOverall Patient Progress: no change    Outcome Evaluation: 1L o2 via NC. No prns given. Pt concerned about the possibility of leaving on 11/8, she is not sure how she well she will be able to manage pain at home. Radiation therapy to continue 11/8      Problem: Adult Inpatient Plan of Care  Goal: Plan of Care Review  Description: The Plan of Care Review/Shift note should be completed every shift.  The Outcome Evaluation is a brief statement about your assessment that the patient is improving, declining, or no change.  This information will be displayed automatically on your shift  note.  Outcome: Progressing  Flowsheets (Taken 11/7/2024 2155)  Outcome Evaluation: 1L o2 via NC. No prns given. Radiation therapy to continue 11/8  Plan of Care Reviewed With: patient  Overall Patient Progress: no change  Goal: Absence of Hospital-Acquired Illness or Injury  Intervention: Identify and Manage Fall Risk  Recent Flowsheet Documentation  Taken 11/7/2024 2017 by Genny Camp RN  Safety Promotion/Fall Prevention:   activity supervised   assistive device/personal items within reach   clutter free environment maintained   nonskid shoes/slippers when out of bed   safety round/check completed  Intervention: Prevent Infection  Recent Flowsheet Documentation  Taken 11/7/2024 2017 by Genny Camp RN  Infection Prevention:   single patient room provided   rest/sleep promoted     Problem: Fall Injury Risk  Goal: Absence of Fall and Fall-Related Injury  Intervention: Identify and Manage Contributors  Recent Flowsheet Documentation  Taken 11/7/2024 2017 by Genny Camp, RN  Medication Review/Management: medications reviewed  Intervention: Promote Injury-Free Environment  Recent Flowsheet Documentation  Taken 11/7/2024 2017 by Genny Camp, RN  Safety Promotion/Fall Prevention:   activity supervised   assistive  device/personal items within reach   clutter free environment maintained   nonskid shoes/slippers when out of bed   safety round/check completed     Problem: Pain Acute  Goal: Optimal Pain Control and Function  Intervention: Prevent or Manage Pain  Recent Flowsheet Documentation  Taken 11/7/2024 2017 by Genny Camp RN  Medication Review/Management: medications reviewed     Problem: Comorbidity Management  Goal: Blood Pressure in Desired Range  Intervention: Maintain Blood Pressure Management  Recent Flowsheet Documentation  Taken 11/7/2024 2017 by Genny Camp RN  Medication Review/Management: medications reviewed  Goal: Maintenance of Osteoarthritis Symptom Control  Intervention: Maintain Osteoarthritis Symptom Control  Recent Flowsheet Documentation  Taken 11/7/2024 2017 by Genny Camp RN  Medication Review/Management: medications reviewed

## 2024-11-08 NOTE — PROGRESS NOTES
[facility] ACUTE PAIN SERVICE    [facility], [facility], [facility], [facility], [place]   PAIN Progress Note    Assessment/Plan:  Scarlet Chapa is a 69 year old female who was admitted on 11/2/2024.  I was asked by Dr. Luis Carpenter to see the patient for management of her acute on chronic mid back pain in the setting of opioid tolerance. Admitted for an uncontrolled episode of mid back pain without inciting event. History of hypertension, stage 3a CKD, multiple myeloma, osteoporosis.  MRI supports vertebral fractures multilevel throughout the thoracic spine T5,6,8,10 with enhancement and circumferential involvement at T10-11, acute fracture L2-3 and myelomatous involvement throughout the lumbar spine, pelvic and sacrum.  She has had discussion with her PCP about osteoporosis management and follows regularly with her oncologist.      Opioid Induced Respiratory Depression Risk Assessment: moderate risk with age CKD naive opioid status ?  CrCl  67.6 mg/mL  The patient's home prior to admission reports recent use of 2 tablet(s) oxycodone 6 x per day prior use 1-2 per day opioid use this past 24 hrs 10 mg Oxycodone (2), OxyContin 20 mg (0), Fentanyl 12 mcg day 1 = 45 mg MME. Compared to use yesterday of 75 mg MME  Adjuvants: hydroxyzine 25 mg (2) mg daily. In the last 24 hours  MNPMP: reviewed by me 11/06/24    Usage indicates mild to moderate tolerance     PLAN:   1) Pain is consistent with  musculoskeletal mid and low back pain in setting of vertebral fractures multilevel  in the setting of myeloma. Consider the following differentials role of osteoporosis and possible disease progression of myeloma as evidence suggested by MRI lumbar w/w/o contrast.    Multimodal Medication Therapy  Adjuvants:   - Magnesium 400 mg bid, calcitonin nasal spray every day x 14 days consider adding gabapentin  - Ketorolac IV 15mg q6h prn  - Topical Lidocaine patch at hs  - other palliative intent radiation day 5 then complete  -  trial of soft bracing abd binder (unable to tolerate or manage TLSO)  Opioids:   - fentanyl 12 mcg patch pharmacy liaison reports high co pay (Fentanyl$145.00, then $30.00 once $350.00 deductible met) - Oxycodone  10-15mg q4h prn   Ok to increase Fentanyl patch as early as 11/01 or 11/11 if needed  - IV not indicated  Non-medication interventions- Ice  Follow up /Discharge Recommendations   - We recommend prescribing the following at the time of discharge:   Fentanyl patch dose TBD  Oxycodone dose TBD  Calcitonin nasal spray x 14 days total  Magnesium 400 mg bid  Intranasal naloxone is recommended for MME >50 mg  Follow up PCP Osteoporosis management Daly Bedoya MD   Follow up with oncologist Malissa Matthews NP  -Opioid prescriber has been Malissa Matthews NP  -Acute pain service will continue to follow   Disposition: home  Prescribing at discharge: hospital medicine     Subjective:  Describes pain as 5/10 and constant located across the low back and upper T spine  The patient denies vomiting, constipation, diarrhea, chest pain, dizziness, fever, chills, saddle anesthesia, and changes to bowel or bladder habits. The patient reports nausea, shortness of breath, no BM yet, and cough.  Good appetite, BM 11/6. Less pursed lip breathing.        Principal Problem:  <principal problem not specified>     Patient Active Problem List   Diagnosis    S/P total left hip arthroplasty    Hyperlipidemia    HTN (hypertension)    Stage 3a chronic kidney disease (H)    Kidney stone    Nephrolithiasis    Hydronephrosis with urinary obstruction due to ureteral calculus    Class 2 severe obesity due to excess calories with serious comorbidity in adult (H)    Intractable back pain        Objective:    History   Drug Use No          Tobacco Use      Smoking status: Never      Smokeless tobacco: Never      Vital signs in last 24 hours:  BP (!) 149/68 (BP Location: Left arm)   Pulse 105   Temp 97.8  F (36.6  C) (Temporal)   Resp 16   Ht  "1.626 m (5' 4\")   Wt 92.1 kg (203 lb 0.7 oz)   SpO2 96%   BMI 34.85 kg/m      Weight:     Vitals:    11/02/24 1325 11/08/24 0700   Weight: 90.7 kg (200 lb) 92.1 kg (203 lb 0.7 oz)      Weight change:   Body mass index is 34.85 kg/m .    Intake/Output last 3 shifts:  I/O last 3 completed shifts:  In: 110 [P.O.:100; I.V.:10]  Out: -   Intake/Output this shift:  No intake/output data recorded.    Review of Systems:   As per subjective, all others negative.    Physical Exam:  General Appearance:  Alert, cooperative, no distress, appears stated age   Head:  Normocephalic, without obvious abnormality, atraumatic   Eyes:  PERRL,normal size, conjunctiva/corneas clear, EOM's intact   Nose: Nares normal, septum midline   Throat: Lips, mucosa, and tongue normal; teeth and gums normal   Neck: Supple, symmetrical, trachea midline   Back:   Symmetric, no curvature, ROM normal   Lungs:   Clear to auscultation bilaterally, respirations unlabored short inspiratory and expiratory effort   Chest Wall:  No tenderness or deformity   Heart:  Regular rate and rhythm, S1, S2 normal    Abdomen:   Soft, non-tender, bowel sounds active all four quadrants,  no masses, no organomegaly    Extremities: Extremities normal, atraumatic, no cyanosis or edema   Skin: Skin color, texture, turgor normal, no rashes or lesions   Neurologic: Alert and oriented X 3, Moves all 4 extremities   Psych: Affect is anxious (mild) aberrant Pain behaviors No     Labs Imaging and Notes Reviewed : Reviewed I have personally reviewed pertinent notes, labs, tests, and radiologic  report and imaging in patient's chart. yes    Total time spent 35 minutes with greater than 50% in consultation, education and coordination of care.   Also discussed with RN, Hospital Medicine Service, and [facility].   I discussed and educated the pain plan with the patient regarding:  multimodal pain approach, medications as listed above, and watch for constipation and stool " softeners.  Treatment plan includes: multimodal pain approach, Hospital Medicine Service for medical management.   Elements of Medical Decision Making as described above. High level of decision making required due to 1 or more chronic illness with severe exacerbation, progression, and side effects of treatment. Acute or chronic illness or injury or surgery. Illness that poses a threat to life or bodily function. High risk therapy including opioids, high risk drug therapy including oral and/or parenteral controlled substances.    Please see A&P for additional details of medical decision making.    Patient is understanding of the plan. All questions and concerns addressed to patient's satisfaction.     Lawanda Cain, APRN,CNP, ACHPN, PGMT-BC  Acute Care Pain Management Program  [facility]   Monday-Friday 8a-4p   Page via Epic or [employer]

## 2024-11-08 NOTE — PLAN OF CARE
"Goal Outcome Evaluation:    VSS, on 0.5 L O2 NC. Pt drops to mid 80's when weaned off oxygen. CMS intact, A&Ox4. SBA with walker/ GB. Pain is controlled with fentanyl patch and oxycodone. Pt reports anxiety about going home, but did well with PT today and is ok do discharge from a PT standpoint. Discharge plan tbd as pt still needs oxygen.       Plan of Care Reviewed With: patient    Overall Patient Progress: improvingOverall Patient Progress: improving       Problem: Adult Inpatient Plan of Care  Goal: Plan of Care Review  Description: The Plan of Care Review/Shift note should be completed every shift.  The Outcome Evaluation is a brief statement about your assessment that the patient is improving, declining, or no change.  This information will be displayed automatically on your shift  note.  Outcome: Progressing  Flowsheets (Taken 11/8/2024 0773)  Plan of Care Reviewed With: patient  Overall Patient Progress: improving  Goal: Patient-Specific Goal (Individualized)  Description: You can add care plan individualizations to a care plan. Examples of Individualization might be:  \"Parent requests to be called daily at 9am for status\", \"I have a hard time hearing out of my right ear\", or \"Do not touch me to wake me up as it startles  me\".  Outcome: Progressing  Goal: Absence of Hospital-Acquired Illness or Injury  Outcome: Progressing  Intervention: Identify and Manage Fall Risk  Recent Flowsheet Documentation  Taken 11/8/2024 0704 by Penny Nelson, RN  Safety Promotion/Fall Prevention:   assistive device/personal items within reach   activity supervised   safety round/check completed  Intervention: Prevent Skin Injury  Recent Flowsheet Documentation  Taken 11/8/2024 0704 by Penny Nelson, RN  Body Position: position changed independently  Intervention: Prevent Infection  Recent Flowsheet Documentation  Taken 11/8/2024 0704 by Penny Nelson, RN  Infection Prevention: rest/sleep promoted  Goal: Optimal Comfort " and Wellbeing  Outcome: Progressing  Intervention: Monitor Pain and Promote Comfort  Recent Flowsheet Documentation  Taken 11/8/2024 0704 by Penny Nelson RN  Pain Management Interventions: medication (see MAR)  Goal: Readiness for Transition of Care  Outcome: Progressing     Problem: Fall Injury Risk  Goal: Absence of Fall and Fall-Related Injury  Outcome: Progressing  Intervention: Identify and Manage Contributors  Recent Flowsheet Documentation  Taken 11/8/2024 0704 by Penny Nelson RN  Medication Review/Management: medications reviewed  Intervention: Promote Injury-Free Environment  Recent Flowsheet Documentation  Taken 11/8/2024 0704 by Penny Nelson RN  Safety Promotion/Fall Prevention:   assistive device/personal items within reach   activity supervised   safety round/check completed     Problem: Pain Acute  Goal: Optimal Pain Control and Function  Outcome: Progressing  Intervention: Develop Pain Management Plan  Recent Flowsheet Documentation  Taken 11/8/2024 0704 by Penny Nelson RN  Pain Management Interventions: medication (see MAR)  Intervention: Prevent or Manage Pain  Recent Flowsheet Documentation  Taken 11/8/2024 0704 by Penny Nelson RN  Bowel Elimination Promotion: adequate fluid intake promoted  Medication Review/Management: medications reviewed     Problem: Comorbidity Management  Goal: Blood Pressure in Desired Range  Outcome: Progressing  Intervention: Maintain Blood Pressure Management  Recent Flowsheet Documentation  Taken 11/8/2024 0704 by Penny Nelson RN  Medication Review/Management: medications reviewed  Goal: Maintenance of Osteoarthritis Symptom Control  Outcome: Progressing  Intervention: Maintain Osteoarthritis Symptom Control  Recent Flowsheet Documentation  Taken 11/8/2024 0950 by Penny Nelson, RN  Assistive Device Utilized:   gait belt   walker  Activity Management: ambulated to bathroom  Taken 11/8/2024 0704 by Penny Nelson RN  Assistive Device  Utilized:   gait belt   walker  Activity Management: activity adjusted per tolerance  Medication Review/Management: medications reviewed

## 2024-11-08 NOTE — TELEPHONE ENCOUNTER
Prior Authorization Approval    Medication: FENTANYL 12 MCG/HR TD PT72  Authorization Effective Date: 8/9/2024  Authorization Expiration Date: 11/7/2025  Approved Dose/Quantity: 10 for 30  Reference #: [id number]   Insurance Company: [employer] [place] - Phone [phone] Fax [phone]  Expected CoPay: $ 145  CoPay Card Available:      Financial Assistance Needed:   Which Pharmacy is filling the prescription:    Pharmacy Notified:   Patient Notified:

## 2024-11-08 NOTE — PROGRESS NOTES
[facility]    Medicine Progress Note - Hospitalist Service    Date of Admission:  11/2/2024    Assessment & Plan   69-year-old female with recently diagnosed multiple myeloma on bone marrow biopsy but has not been started on treatment yet.  She comes in with severe back pain with multiple pathological fractures T5, 8, 10 and 12 seen on MRI and CT with no cord abnormality.    Severe back pain due to pathological fractures and T5, 8, 10 and 12 and L2/3 vertebral bodies.  - No neurosurgical intervention needed as per neurosurgery.  Recommended to consider bracing for comfort, patient was seen by orthotics but did not tolerate brace.      - Started radiation therapy to the spine T8-L3 on 11/4, plan for total of 5 days.  - Pain medication as per pain management team.  Started on scheduled Tylenol 975 mg 4 times daily, scheduled OxyContin 20 mg twice daily.  As needed medications include oxycodone 10-15 mg, hydroxyzine and IV ketorolac.  Monitor renal function while on ketorolac.  - Initial plan is to discharge home after completing radiation 11/8, however respiratory issues continue    Multiple myeloma  Recent diagnosis but has not been started on treatment yet.  Patient had a meeting scheduled with her oncology team on 11/5 which will be rescheduled    Essential hypertension  Resumed amlodipine and losartan.  Blood pressure still high, due to pain.  As needed hydralazine ordered.    Anemia and mild leukopenia  Likely secondary to multiple myeloma.  Mild hypoxia and diaphoresis on 11/5.  Diaphoresis is likely type B symptoms.  Did not have any chest pain or shortness of breath and EKG sinus rhythm with negative troponin x 2.  Chest x-ray showed atelectasis with possible vascular congestion and was given Lasix x 1 but echo showed normal EF and had normal BNP levels.  Suspect she is not taking deep breaths due to pain and encouraged incentive spirometry.    Hypoxic respiratory failure  Continues to  "require 1-2 L of supplemental oxygen to maintain O2 sats.  Cause somewhat unclear at this time.  Patient states that she feels like she cannot take a deep breath, does not do particularly well with incentive spirometry.  Despite this lung sounds are pretty clear, does report she has maybe had a little more of a cough the last day or 2.  Repeat chest x-ray in the morning, however really no signs or symptoms of developing respiratory faction at this point.  - CXR in a.m.            Diet: Gluten Free Diet  Snacks/Supplements Adult: Tanya Griggs Standard Oral Supplement; With Meals    DVT Prophylaxis: Enoxaparin (Lovenox) SQ  Vargas Catheter: Not present  Lines: None     Cardiac Monitoring: None  Code Status: Full Code      Clinically Significant Risk Factors         # Hyponatremia: Lowest Na = 134 mmol/L in last 2 days, will monitor as appropriate  # Hypochloremia: Lowest Cl = 97 mmol/L in last 2 days, will monitor as appropriate          # Hypertension: Noted on problem list           # Obesity: Estimated body mass index is 34.85 kg/m  as calculated from the following:    Height as of this encounter: 1.626 m (5' 4\").    Weight as of this encounter: 92.1 kg (203 lb 0.7 oz).   # Moderate Malnutrition: based on nutrition assessment           Disposition Plan     Medically Ready for Discharge: Anticipated in 2-4 Days             Jerman Tolbert MD  Hospitalist Service  [facility]  Securely message with [employer] (more info)  Text page via [employer] Paging/Directory   ______________________________________________________________________    Interval History   No acute events overnight.  Back pain ongoing, although a little better controlled when at rest.  Continues to have difficulty taking deep breaths, continues to desat with movement.  Encouraging ongoing incentive spirometry use.    Physical Exam   Vital Signs: Temp: 97.8  F (36.6  C) Temp src: Temporal BP: (!) 149/68 Pulse: 105   Resp: 16 SpO2: 96 % O2 " Device: Nasal cannula Oxygen Delivery: 1 LPM  Weight: 203 lbs .7 oz    General Appearance: Alert awake and oriented x 3.  Lying in the bed and does not move much due to fear of pain.  Respiratory: Clear to auscultation, unable to take very deep breath but normal work of breathing otherwise  Cardiovascular: S1-S2 normal  GI: Soft and nontender  Skin: No rash  Other: No edema      Medical Decision Making       MANAGEMENT DISCUSSED with the following over the past 24 hours: Patient and RN       Data     I have personally reviewed the following data over the past 24 hrs:    N/A  \   N/A   / 200     N/A N/A N/A /  91   N/A N/A 1.00 (H) \       Imaging results reviewed over the past 24 hrs:   No results found for this or any previous visit (from the past 24 hours).

## 2024-11-09 ENCOUNTER — APPOINTMENT (OUTPATIENT)
Dept: PHYSICAL THERAPY | Facility: CLINIC | Age: 69
DRG: 542 | End: 2024-11-09
Payer: COMMERCIAL

## 2024-11-09 LAB
ALBUMIN SERPL BCG-MCNC: 4.1 G/DL (ref 3.5–5.2)
ALP SERPL-CCNC: 168 U/L (ref 40–150)
ALT SERPL W P-5'-P-CCNC: 61 U/L (ref 0–50)
ANION GAP SERPL CALCULATED.3IONS-SCNC: 14 MMOL/L (ref 7–15)
AST SERPL W P-5'-P-CCNC: 37 U/L (ref 0–45)
BILIRUB SERPL-MCNC: 0.5 MG/DL
BUN SERPL-MCNC: 19.6 MG/DL (ref 8–23)
CALCIUM SERPL-MCNC: 10.5 MG/DL (ref 8.8–10.4)
CHLORIDE SERPL-SCNC: 100 MMOL/L (ref 98–107)
CREAT SERPL-MCNC: 0.91 MG/DL (ref 0.51–0.95)
EGFRCR SERPLBLD CKD-EPI 2021: 69 ML/MIN/1.73M2
ERYTHROCYTE [DISTWIDTH] IN BLOOD BY AUTOMATED COUNT: 14.9 % (ref 10–15)
GLUCOSE SERPL-MCNC: 99 MG/DL (ref 70–99)
HCO3 SERPL-SCNC: 24 MMOL/L (ref 22–29)
HCT VFR BLD AUTO: 29.9 % (ref 35–47)
HGB BLD-MCNC: 10.1 G/DL (ref 11.7–15.7)
MCH RBC QN AUTO: 30.9 PG (ref 26.5–33)
MCHC RBC AUTO-ENTMCNC: 33.8 G/DL (ref 31.5–36.5)
MCV RBC AUTO: 91 FL (ref 78–100)
PLATELET # BLD AUTO: 197 10E3/UL (ref 150–450)
POTASSIUM SERPL-SCNC: 4.8 MMOL/L (ref 3.4–5.3)
PROT SERPL-MCNC: 6.7 G/DL (ref 6.4–8.3)
RBC # BLD AUTO: 3.27 10E6/UL (ref 3.8–5.2)
SODIUM SERPL-SCNC: 138 MMOL/L (ref 135–145)
WBC # BLD AUTO: 2.2 10E3/UL (ref 4–11)

## 2024-11-09 PROCEDURE — 120N000001 HC R&B MED SURG/OB

## 2024-11-09 PROCEDURE — 250N000013 HC RX MED GY IP 250 OP 250 PS 637: Performed by: INTERNAL MEDICINE

## 2024-11-09 PROCEDURE — 85018 HEMOGLOBIN: CPT | Performed by: STUDENT IN AN ORGANIZED HEALTH CARE EDUCATION/TRAINING PROGRAM

## 2024-11-09 PROCEDURE — 97530 THERAPEUTIC ACTIVITIES: CPT | Mod: GP

## 2024-11-09 PROCEDURE — 99232 SBSQ HOSP IP/OBS MODERATE 35: CPT | Performed by: STUDENT IN AN ORGANIZED HEALTH CARE EDUCATION/TRAINING PROGRAM

## 2024-11-09 PROCEDURE — 250N000013 HC RX MED GY IP 250 OP 250 PS 637: Performed by: STUDENT IN AN ORGANIZED HEALTH CARE EDUCATION/TRAINING PROGRAM

## 2024-11-09 PROCEDURE — 82947 ASSAY GLUCOSE BLOOD QUANT: CPT | Performed by: STUDENT IN AN ORGANIZED HEALTH CARE EDUCATION/TRAINING PROGRAM

## 2024-11-09 PROCEDURE — 84155 ASSAY OF PROTEIN SERUM: CPT | Performed by: STUDENT IN AN ORGANIZED HEALTH CARE EDUCATION/TRAINING PROGRAM

## 2024-11-09 PROCEDURE — 250N000013 HC RX MED GY IP 250 OP 250 PS 637: Performed by: NURSE PRACTITIONER

## 2024-11-09 PROCEDURE — 97116 GAIT TRAINING THERAPY: CPT | Mod: GP

## 2024-11-09 PROCEDURE — 250N000013 HC RX MED GY IP 250 OP 250 PS 637: Performed by: PHYSICIAN ASSISTANT

## 2024-11-09 PROCEDURE — 250N000011 HC RX IP 250 OP 636: Performed by: INTERNAL MEDICINE

## 2024-11-09 PROCEDURE — 36415 COLL VENOUS BLD VENIPUNCTURE: CPT | Performed by: STUDENT IN AN ORGANIZED HEALTH CARE EDUCATION/TRAINING PROGRAM

## 2024-11-09 RX ORDER — FENTANYL 12.5 UG/1
1 PATCH TRANSDERMAL
Qty: 5 PATCH | Refills: 0 | Status: SHIPPED | OUTPATIENT
Start: 2024-11-10

## 2024-11-09 RX ORDER — LIDOCAINE 4 G/G
2 PATCH TOPICAL EVERY 24 HOURS
Qty: 10 PATCH | Refills: 0 | Status: SHIPPED | OUTPATIENT
Start: 2024-11-09

## 2024-11-09 RX ORDER — ONDANSETRON 4 MG/1
4 TABLET, ORALLY DISINTEGRATING ORAL EVERY 6 HOURS PRN
Qty: 20 TABLET | Refills: 0 | Status: SHIPPED | OUTPATIENT
Start: 2024-11-09

## 2024-11-09 RX ORDER — MAGNESIUM OXIDE 400 MG/1
400 TABLET ORAL 2 TIMES DAILY
Qty: 60 TABLET | Refills: 0 | Status: SHIPPED | OUTPATIENT
Start: 2024-11-09

## 2024-11-09 RX ORDER — HYDROXYZINE HYDROCHLORIDE 25 MG/1
25 TABLET, FILM COATED ORAL EVERY 8 HOURS PRN
Qty: 30 TABLET | Refills: 0 | Status: SHIPPED | OUTPATIENT
Start: 2024-11-09

## 2024-11-09 RX ORDER — OXYCODONE HYDROCHLORIDE 10 MG/1
10 TABLET ORAL EVERY 4 HOURS PRN
Qty: 10 TABLET | Refills: 0 | Status: SHIPPED | OUTPATIENT
Start: 2024-11-09

## 2024-11-09 RX ORDER — CALCITONIN SALMON 200 [IU]/.09ML
1 SPRAY, METERED NASAL DAILY
Qty: 3.7 ML | Refills: 0 | Status: SHIPPED | OUTPATIENT
Start: 2024-11-10

## 2024-11-09 RX ADMIN — OXYCODONE HYDROCHLORIDE 10 MG: 10 TABLET ORAL at 08:35

## 2024-11-09 RX ADMIN — PANTOPRAZOLE SODIUM 40 MG: 40 TABLET, DELAYED RELEASE ORAL at 05:54

## 2024-11-09 RX ADMIN — ENOXAPARIN SODIUM 40 MG: 40 INJECTION SUBCUTANEOUS at 20:32

## 2024-11-09 RX ADMIN — CALCITONIN SALMON 1 SPRAY: 200 SPRAY, METERED NASAL at 08:22

## 2024-11-09 RX ADMIN — AMLODIPINE BESYLATE 5 MG: 5 TABLET ORAL at 08:21

## 2024-11-09 RX ADMIN — SENNOSIDES 1 TABLET: 8.6 TABLET, FILM COATED ORAL at 20:37

## 2024-11-09 RX ADMIN — HYDROXYZINE HYDROCHLORIDE 25 MG: 25 TABLET, FILM COATED ORAL at 08:36

## 2024-11-09 RX ADMIN — HYDROXYZINE HYDROCHLORIDE 50 MG: 50 TABLET, FILM COATED ORAL at 20:31

## 2024-11-09 RX ADMIN — Medication 400 MG: at 00:08

## 2024-11-09 RX ADMIN — LOSARTAN POTASSIUM 100 MG: 100 TABLET, FILM COATED ORAL at 08:21

## 2024-11-09 RX ADMIN — Medication 400 MG: at 20:31

## 2024-11-09 RX ADMIN — OXYCODONE HYDROCHLORIDE 10 MG: 10 TABLET ORAL at 20:31

## 2024-11-09 RX ADMIN — LIDOCAINE 2 PATCH: 4 PATCH TOPICAL at 20:32

## 2024-11-09 RX ADMIN — Medication 400 MG: at 23:46

## 2024-11-09 RX ADMIN — OXYCODONE HYDROCHLORIDE 10 MG: 10 TABLET ORAL at 14:39

## 2024-11-09 NOTE — PROGRESS NOTES
[facility]     Patient is anticipated to discharge 11/10.  Patient agrees to have [facility] provide SN PT OT HHA services. Patient will receive a phone call from [facility] to schedule an initial home care visit post discharge from the hospital. Anticipated start of care date is [within 24-48 hours of discharge].     Please note: SOC date is based on availability of the day referral was received. If patients discharge date changes, please reach out to Clinical Liaison or the HUB to ensure SOC date is still appropriate for a safe discharge plan.     Thank you for the referral.     LIBBY Montemayor, LPN  [facility] Liaison   Cell: [phone]

## 2024-11-09 NOTE — CARE PLAN
Called home O2 answering service for home O2 delivery this evening or tomorrow. Waiting for call back.

## 2024-11-09 NOTE — PLAN OF CARE
"Goal Outcome Evaluation:      Plan of Care Reviewed With: patient    Overall Patient Progress: no changeOverall Patient Progress: no change    Outcome Evaluation: X-ray was done ,readilogy result -negative chest.pt still is on 1 L oxygen.  plan is to dischrge  tomorrow, 11/9/24.    Pt is A&OX4.  On 1 L oxygen, via N/C. No cough. LS CTA.  no c/o N/V. Abdomen round, large in size. BS active X4.  Last BM was today.Refused scheduled Miralax. IV CDI, SL. Tolerating  regular diet. Pain controlled with PRN Oxycodone, Atarax;scheduled  fentanyl and lidocaine patches.Voiding without difficulty.denies N/T. CMS intact.A-1 with walker and gate belt.             Problem: Adult Inpatient Plan of Care  Goal: Plan of Care Review  Description: The Plan of Care Review/Shift note should be completed every shift.  The Outcome Evaluation is a brief statement about your assessment that the patient is improving, declining, or no change.  This information will be displayed automatically on your shift  note.  11/8/2024 2243 by Celine Steele RN  Outcome: Progressing  Flowsheets (Taken 11/8/2024 2243)  Plan of Care Reviewed With: patient  Overall Patient Progress: no change  11/8/2024 2232 by Celine Steele RN  Outcome: Progressing  Flowsheets (Taken 11/8/2024 2228)  Outcome Evaluation: X-ray was done ,readilogy result -negative chest.pt still is on 1 L oxygen.  plan is to dischrge  tomorrow, 11/9/24.  Plan of Care Reviewed With: patient  Overall Patient Progress: no change  Goal: Patient-Specific Goal (Individualized)  Description: You can add care plan individualizations to a care plan. Examples of Individualization might be:  \"Parent requests to be called daily at 9am for status\", \"I have a hard time hearing out of my right ear\", or \"Do not touch me to wake me up as it startles  me\".  11/8/2024 2243 by Celine Steele RN  Outcome: Progressing  11/8/2024 2232 by Celine Steele RN  Outcome: Progressing  Goal: Absence of Hospital-Acquired " Illness or Injury  11/8/2024 2243 by Celine Steele RN  Outcome: Progressing  11/8/2024 2232 by Celine Steele RN  Outcome: Progressing  Intervention: Prevent and Manage VTE (Venous Thromboembolism) Risk  Recent Flowsheet Documentation  Taken 11/8/2024 1700 by Celine Steele RN  VTE Prevention/Management: SCDs off (sequential compression devices)  Intervention: Prevent Infection  Recent Flowsheet Documentation  Taken 11/8/2024 1700 by Celine Steele RN  Infection Prevention: rest/sleep promoted  Goal: Optimal Comfort and Wellbeing  11/8/2024 2243 by Celine Steele RN  Outcome: Progressing  11/8/2024 2232 by Celine Steele RN  Outcome: Progressing  Intervention: Monitor Pain and Promote Comfort  Recent Flowsheet Documentation  Taken 11/8/2024 1707 by Celine Steele RN  Pain Management Interventions: medication (see MAR)  Goal: Readiness for Transition of Care  11/8/2024 2243 by Celine Steele RN  Outcome: Progressing  11/8/2024 2232 by Celine Steele R

## 2024-11-09 NOTE — PLAN OF CARE
"Pt alert and oriented. On 1L nc, pt sob and desats to low 80's with exertion. Tachy at times. Denies pain. PIV saline locked. Voiding adequately, had a BM this shift.     Problem: Adult Inpatient Plan of Care  Goal: Plan of Care Review  Description: The Plan of Care Review/Shift note should be completed every shift.  The Outcome Evaluation is a brief statement about your assessment that the patient is improving, declining, or no change.  This information will be displayed automatically on your shift  note.  Outcome: Progressing  Flowsheets (Taken 11/9/2024 6501)  Outcome Evaluation: Pt alert and oriented. On 1L nc, pt sob and desats to low 80's with exertion. Tachy at times. Denies pain. PIV saline locked. Voiding adequately, had a BM this shift.  Plan of Care Reviewed With: patient  Overall Patient Progress: no change  Goal: Patient-Specific Goal (Individualized)  Description: You can add care plan individualizations to a care plan. Examples of Individualization might be:  \"Parent requests to be called daily at 9am for status\", \"I have a hard time hearing out of my right ear\", or \"Do not touch me to wake me up as it startles  me\".  Outcome: Progressing  Goal: Absence of Hospital-Acquired Illness or Injury  Outcome: Progressing  Intervention: Prevent Skin Injury  Recent Flowsheet Documentation  Taken 11/9/2024 9654 by Nati Monteiro, RN  Body Position: position changed independently  Goal: Optimal Comfort and Wellbeing  Outcome: Progressing  Goal: Readiness for Transition of Care  Outcome: Progressing     Problem: Fall Injury Risk  Goal: Absence of Fall and Fall-Related Injury  Outcome: Progressing     Problem: Pain Acute  Goal: Optimal Pain Control and Function  Outcome: Progressing     Problem: Comorbidity Management  Goal: Blood Pressure in Desired Range  Outcome: Progressing  Goal: Maintenance of Osteoarthritis Symptom Control  Outcome: Progressing  Intervention: Maintain Osteoarthritis Symptom " Control  Recent Flowsheet Documentation  Taken 11/9/2024 0547 by Nati Monteiro, RN  Assistive Device Utilized:   gait belt   walker  Activity Management: ambulated to bathroom     Problem: Infection  Goal: Absence of Infection Signs and Symptoms  Outcome: Progressing   Goal Outcome Evaluation:      Plan of Care Reviewed With: patient    Overall Patient Progress: no changeOverall Patient Progress: no change    Outcome Evaluation: Pt alert and oriented. On 1L nc, pt sob and desats to low 80's with exertion. Tachy at times. Denies pain. PIV saline locked. Voiding adequately, had a BM this shift.

## 2024-11-09 NOTE — PROGRESS NOTES
Patient has been assessed for Home Oxygen needs. Oxygen readings:    *Pulse oximetry (SpO2) = 95% on room air at rest while awake.    *SpO2 improved to 98% on 1liters/minute at rest.    *SpO2 = 86% on room air during activity/with exercise.    *SpO2 improved to 92% on 1liters/minute during activity/with exercise.

## 2024-11-09 NOTE — PLAN OF CARE
"  Problem: Adult Inpatient Plan of Care  Goal: Plan of Care Review  Description: The Plan of Care Review/Shift note should be completed every shift.  The Outcome Evaluation is a brief statement about your assessment that the patient is improving, declining, or no change.  This information will be displayed automatically on your shift  note.  11/8/2024 2243 by Celine Steele RN  Outcome: Progressing  Flowsheets (Taken 11/8/2024 2243)  Plan of Care Reviewed With: patient  Overall Patient Progress: no change  11/8/2024 2232 by Celine Steele RN  Outcome: Progressing  Flowsheets (Taken 11/8/2024 2228)  Outcome Evaluation: X-ray was done ,readilogy result -negative chest.pt still is on 1 L oxygen.  plan is to dischrge  tomorrow, 11/9/24.  Plan of Care Reviewed With: patient  Overall Patient Progress: no change  Goal: Patient-Specific Goal (Individualized)  Description: You can add care plan individualizations to a care plan. Examples of Individualization might be:  \"Parent requests to be called daily at 9am for status\", \"I have a hard time hearing out of my right ear\", or \"Do not touch me to wake me up as it startles  me\".  11/8/2024 2243 by Celine Steele RN  Outcome: Progressing  11/8/2024 2232 by Celine Steele RN  Outcome: Progressing  Goal: Absence of Hospital-Acquired Illness or Injury  11/8/2024 2243 by Celine Steele RN  Outcome: Progressing  11/8/2024 2232 by Celine Steele RN  Outcome: Progressing  Intervention: Prevent and Manage VTE (Venous Thromboembolism) Risk  Recent Flowsheet Documentation  Taken 11/8/2024 1700 by Celine Steele RN  VTE Prevention/Management: SCDs off (sequential compression devices)  Intervention: Prevent Infection  Recent Flowsheet Documentation  Taken 11/8/2024 1700 by Celine Steele RN  Infection Prevention: rest/sleep promoted  Goal: Optimal Comfort and Wellbeing  11/8/2024 2243 by Celine Steele RN  Outcome: Progressing  11/8/2024 2232 by Celine Steele RN  Outcome: " Progressing  Intervention: Monitor Pain and Promote Comfort  Recent Flowsheet Documentation  Taken 11/8/2024 1707 by Celine Steele RN  Pain Management Interventions: medication (see MAR)  Goal: Readiness for Transition of Care  11/8/2024 2243 by Celine Steele RN  Outcome: Progressing  11/8/2024 2232 by Celine Steele RN  Outcome: Progressing   Goal Outcome Evaluation:      Plan of Care Reviewed With: patient    Overall Patient Progress: no changeOverall Patient Progress: no change    Outcome Evaluation: X-ray was done ,readilogy result -negative chest.pt still is on 1 L oxygen.  plan is to dischrge  tomorrow, 11/9/24.

## 2024-11-09 NOTE — PLAN OF CARE
Goal Outcome Evaluation:  Up to bathroom with standby assist, gait belt, and walker. Gave oxycodone and atarax for pain. Appetite good. C/o slight nausea.         Problem: Adult Inpatient Plan of Care  Goal: Absence of Hospital-Acquired Illness or Injury  Intervention: Identify and Manage Fall Risk  Recent Flowsheet Documentation  Taken 11/9/2024 0835 by Bailee Reno, RN  Safety Promotion/Fall Prevention:   activity supervised   clutter free environment maintained   nonskid shoes/slippers when out of bed  Intervention: Prevent Skin Injury  Recent Flowsheet Documentation  Taken 11/9/2024 0835 by Bailee Reno, RN  Body Position: position changed independently  Goal: Optimal Comfort and Wellbeing  Intervention: Monitor Pain and Promote Comfort  Recent Flowsheet Documentation  Taken 11/9/2024 0835 by Bailee Reno, RN  Pain Management Interventions: medication (see MAR)

## 2024-11-09 NOTE — PROGRESS NOTES
[facility]    Medicine Progress Note - Hospitalist Service    Date of Admission:  11/2/2024    Assessment & Plan   69-year-old female with recently diagnosed multiple myeloma on bone marrow biopsy but has not been started on treatment yet.  She comes in with severe back pain with multiple pathological fractures T5, 8, 10 and 12 seen on MRI and CT with no cord abnormality.    Severe back pain due to pathological fractures and T5, 8, 10 and 12 and L2/3 vertebral bodies.  - No neurosurgical intervention needed as per neurosurgery.  Recommended to consider bracing for comfort, patient was seen by orthotics but did not tolerate brace.      - Started radiation therapy to the spine T8-L3 on 11/4, plan for total of 5 days.  - Pain medication as per pain management team.  Started on scheduled Tylenol 975 mg 4 times daily, scheduled OxyContin 20 mg twice daily.  As needed medications include oxycodone 10-15 mg, hydroxyzine and IV ketorolac.  Monitor renal function while on ketorolac.    Multiple myeloma  Recent diagnosis but has not been started on treatment yet.  Patient had a meeting scheduled with her oncology team on 11/5 which will be rescheduled    Essential hypertension  Resumed amlodipine and losartan.  Blood pressure still high, due to pain.  As needed hydralazine ordered.    Anemia and mild leukopenia  Likely secondary to multiple myeloma.  Mild hypoxia and diaphoresis on 11/5.  Diaphoresis is likely type B symptoms.  Did not have any chest pain or shortness of breath and EKG sinus rhythm with negative troponin x 2.  Chest x-ray showed atelectasis with possible vascular congestion and was given Lasix x 1 but echo showed normal EF and had normal BNP levels.  Suspect she is not taking deep breaths due to pain and encouraged incentive spirometry.    Hypoxic respiratory failure  Continues to require 1-2 L of supplemental oxygen to maintain O2 sats.  Cause somewhat unclear at this time.  Patient  "states that she feels like she cannot take a deep breath, does not do particularly well with incentive spirometry.  Despite this lung sounds are pretty clear, does report she has maybe had a little more of a cough the last day or 2.  CXR without any acute abnormality. May be due to severe deconditioning in setting of MM and recent round of radiation. Will likely discharge on home O2 and follow-up as outpatient to determine ongoing need or etiology of hypoxia.   - Continue supplemental O2 as needed            Diet: Gluten Free Diet  Snacks/Supplements Adult: Tanya Griggs Standard Oral Supplement; With Meals    DVT Prophylaxis: Enoxaparin (Lovenox) SQ  Vargas Catheter: Not present  Lines: None     Cardiac Monitoring: None  Code Status: Full Code      Clinically Significant Risk Factors                   # Hypertension: Noted on problem list           # Obesity: Estimated body mass index is 34.85 kg/m  as calculated from the following:    Height as of this encounter: 1.626 m (5' 4\").    Weight as of this encounter: 92.1 kg (203 lb 0.7 oz).   # Moderate Malnutrition: based on nutrition assessment           Disposition Plan     Medically Ready for Discharge: Anticipated Tomorrow             Jerman Tolbert MD  Hospitalist Service  [facility]  Securely message with [employer] (more info)  Text page via [employer] Paging/Directory   ______________________________________________________________________    Interval History   No acute events overnight.  Back pain actually improved a little today. Still having desaturations at times. Not always with activity but usually with activity. Still feeling like she can't take very deep breaths.     Physical Exam   Vital Signs: Temp: 98.2  F (36.8  C) Temp src: Temporal BP: (!) 147/80 Pulse: 103   Resp: 20 SpO2: 97 % O2 Device: Nasal cannula Oxygen Delivery: 1 LPM  Weight: 203 lbs .7 oz    General Appearance: Alert awake and oriented x 3.  Lying in the bed and does not move " much due to fear of pain.  Respiratory: Clear to auscultation, unable to take very deep breath but normal work of breathing otherwise  Cardiovascular: S1-S2 normal  GI: Soft and nontender  Skin: No rash  Other: No edema      Medical Decision Making       MANAGEMENT DISCUSSED with the following over the past 24 hours: Patient and RN       Data     I have personally reviewed the following data over the past 24 hrs:    2.2 (L)  \   10.1 (L)   / 197     138 100 19.6 /  99   4.8 24 0.91 \     ALT: 61 (H) AST: 37 AP: 168 (H) TBILI: 0.5   ALB: 4.1 TOT PROTEIN: 6.7 LIPASE: N/A       Imaging results reviewed over the past 24 hrs:   Recent Results (from the past 24 hours)   XR Chest 2 Views    Narrative    EXAM: XR CHEST 2 VIEWS  LOCATION: [facility]  DATE: 11/8/2024    INDICATION: Hypoxia  COMPARISON: 11/5/2024      Impression    IMPRESSION: Negative chest.

## 2024-11-10 VITALS
HEIGHT: 64 IN | BODY MASS INDEX: 34.66 KG/M2 | TEMPERATURE: 97.8 F | DIASTOLIC BLOOD PRESSURE: 66 MMHG | SYSTOLIC BLOOD PRESSURE: 140 MMHG | HEART RATE: 96 BPM | WEIGHT: 203.04 LBS | OXYGEN SATURATION: 95 % | RESPIRATION RATE: 14 BRPM

## 2024-11-10 PROCEDURE — 250N000013 HC RX MED GY IP 250 OP 250 PS 637: Performed by: STUDENT IN AN ORGANIZED HEALTH CARE EDUCATION/TRAINING PROGRAM

## 2024-11-10 PROCEDURE — 250N000013 HC RX MED GY IP 250 OP 250 PS 637: Performed by: NURSE PRACTITIONER

## 2024-11-10 PROCEDURE — 99239 HOSP IP/OBS DSCHRG MGMT >30: CPT | Performed by: STUDENT IN AN ORGANIZED HEALTH CARE EDUCATION/TRAINING PROGRAM

## 2024-11-10 PROCEDURE — 250N000013 HC RX MED GY IP 250 OP 250 PS 637: Performed by: PHYSICIAN ASSISTANT

## 2024-11-10 PROCEDURE — 250N000013 HC RX MED GY IP 250 OP 250 PS 637: Performed by: INTERNAL MEDICINE

## 2024-11-10 RX ADMIN — AMLODIPINE BESYLATE 5 MG: 5 TABLET ORAL at 07:36

## 2024-11-10 RX ADMIN — PANTOPRAZOLE SODIUM 40 MG: 40 TABLET, DELAYED RELEASE ORAL at 05:49

## 2024-11-10 RX ADMIN — OXYCODONE HYDROCHLORIDE 10 MG: 10 TABLET ORAL at 05:49

## 2024-11-10 RX ADMIN — LOSARTAN POTASSIUM 100 MG: 100 TABLET, FILM COATED ORAL at 07:36

## 2024-11-10 RX ADMIN — FENTANYL 1 PATCH: 12 PATCH TRANSDERMAL at 10:34

## 2024-11-10 RX ADMIN — OXYCODONE HYDROCHLORIDE 10 MG: 10 TABLET ORAL at 11:52

## 2024-11-10 RX ADMIN — CALCITONIN SALMON 1 SPRAY: 200 SPRAY, METERED NASAL at 07:37

## 2024-11-10 ASSESSMENT — ACTIVITIES OF DAILY LIVING (ADL)
ADLS_ACUITY_SCORE: 0

## 2024-11-10 NOTE — PLAN OF CARE
"A&Ox4. On 1L O2. Pain managed with fentanyl patch and oxycodone. Denies nausea. CMS intact. Tolerating gluten free diet. Voiding appropriately, up SBA gb/walker.     Pt discharged home around 1430. Home O2 connected. AVS reviewed, pt has no further questions at this time.      Goal Outcome Evaluation:      Plan of Care Reviewed With: patient    Overall Patient Progress: improvingOverall Patient Progress: improving       Problem: Adult Inpatient Plan of Care  Goal: Plan of Care Review  Description: The Plan of Care Review/Shift note should be completed every shift.  The Outcome Evaluation is a brief statement about your assessment that the patient is improving, declining, or no change.  This information will be displayed automatically on your shift  note.  Outcome: Met  Flowsheets (Taken 11/10/2024 1447)  Plan of Care Reviewed With: patient  Overall Patient Progress: improving  Goal: Patient-Specific Goal (Individualized)  Description: You can add care plan individualizations to a care plan. Examples of Individualization might be:  \"Parent requests to be called daily at 9am for status\", \"I have a hard time hearing out of my right ear\", or \"Do not touch me to wake me up as it startles  me\".  Outcome: Met  Goal: Absence of Hospital-Acquired Illness or Injury  Outcome: Met  Intervention: Identify and Manage Fall Risk  Recent Flowsheet Documentation  Taken 11/10/2024 1038 by Lorenza Gaffney RN  Safety Promotion/Fall Prevention: safety round/check completed  Intervention: Prevent Skin Injury  Recent Flowsheet Documentation  Taken 11/10/2024 1038 by Lorenza Gaffney RN  Skin Protection: adhesive use limited  Intervention: Prevent and Manage VTE (Venous Thromboembolism) Risk  Recent Flowsheet Documentation  Taken 11/10/2024 1038 by Lorenza Gaffney RN  VTE Prevention/Management: SCDs off (sequential compression devices)  Intervention: Prevent Infection  Recent Flowsheet Documentation  Taken 11/10/2024 1038 by Lorenza Gaffney" MAGDALENE RN  Infection Prevention:   single patient room provided   hand hygiene promoted  Goal: Optimal Comfort and Wellbeing  Outcome: Met  Intervention: Monitor Pain and Promote Comfort  Recent Flowsheet Documentation  Taken 11/10/2024 0732 by Lorenza Gaffney RN  Pain Management Interventions: rest  Goal: Readiness for Transition of Care  Outcome: Met     Problem: Fall Injury Risk  Goal: Absence of Fall and Fall-Related Injury  Outcome: Met  Intervention: Identify and Manage Contributors  Recent Flowsheet Documentation  Taken 11/10/2024 1038 by Lorenza Gaffney RN  Medication Review/Management: medications reviewed  Intervention: Promote Injury-Free Environment  Recent Flowsheet Documentation  Taken 11/10/2024 1038 by Lorenza Gaffney RN  Safety Promotion/Fall Prevention: safety round/check completed     Problem: Pain Acute  Goal: Optimal Pain Control and Function  Outcome: Met  Intervention: Develop Pain Management Plan  Recent Flowsheet Documentation  Taken 11/10/2024 0732 by Lorenza Gaffney RN  Pain Management Interventions: rest  Intervention: Prevent or Manage Pain  Recent Flowsheet Documentation  Taken 11/10/2024 1038 by Lorenza Gaffney RN  Bowel Elimination Promotion: adequate fluid intake promoted  Medication Review/Management: medications reviewed     Problem: Comorbidity Management  Goal: Blood Pressure in Desired Range  Outcome: Met  Intervention: Maintain Blood Pressure Management  Recent Flowsheet Documentation  Taken 11/10/2024 1038 by Lorenza Gaffney RN  Medication Review/Management: medications reviewed  Goal: Maintenance of Osteoarthritis Symptom Control  Outcome: Met  Intervention: Maintain Osteoarthritis Symptom Control  Recent Flowsheet Documentation  Taken 11/10/2024 1119 by Lorenza Gaffney RN  Assistive Device Utilized:   gait belt   walker  Activity Management: ambulated to bathroom  Taken 11/10/2024 1038 by Lorenza Gaffney RN  Medication Review/Management: medications reviewed     Problem:  Infection  Goal: Absence of Infection Signs and Symptoms  Outcome: Met

## 2024-11-10 NOTE — PLAN OF CARE
"Goal Outcome Evaluation:      Plan of Care Reviewed With: patient    Overall Patient Progress: improvingOverall Patient Progress: improving    Outcome Evaluation: Pt AOx4. SBA walker/GB. 1L O2 NC. Pain managed with oxycodone, atarax, lidocaine patch and fentanyl. DVT - lovenox. Plan is to discharge home today w/ O2.      Problem: Adult Inpatient Plan of Care  Goal: Plan of Care Review  Description: The Plan of Care Review/Shift note should be completed every shift.  The Outcome Evaluation is a brief statement about your assessment that the patient is improving, declining, or no change.  This information will be displayed automatically on your shift  note.  Outcome: Progressing  Flowsheets (Taken 11/10/2024 0645)  Outcome Evaluation: Pt AOx4. SBA walker/GB. 1L O2 NC. Pain managed with oxycodone, atarax, lidocaine patch and fentanyl. DVT - lovenox. Plan is to discharge home today w/ O2.  Plan of Care Reviewed With: patient  Overall Patient Progress: improving  Goal: Patient-Specific Goal (Individualized)  Description: You can add care plan individualizations to a care plan. Examples of Individualization might be:  \"Parent requests to be called daily at 9am for status\", \"I have a hard time hearing out of my right ear\", or \"Do not touch me to wake me up as it startles  me\".  Outcome: Progressing  Goal: Absence of Hospital-Acquired Illness or Injury  Outcome: Progressing  Intervention: Identify and Manage Fall Risk  Recent Flowsheet Documentation  Taken 11/9/2024 2048 by Shantal Knight RN  Safety Promotion/Fall Prevention:   activity supervised   assistive device/personal items within reach   clutter free environment maintained   mobility aid in reach   nonskid shoes/slippers when out of bed   room near nurse's station   safety round/check completed  Intervention: Prevent and Manage VTE (Venous Thromboembolism) Risk  Recent Flowsheet Documentation  Taken 11/9/2024 2048 by Shantal Knight, RN  VTE " Prevention/Management: SCDs off (sequential compression devices)  Intervention: Prevent Infection  Recent Flowsheet Documentation  Taken 11/9/2024 2048 by Shantal Knight RN  Infection Prevention:   single patient room provided   rest/sleep promoted   hand hygiene promoted  Goal: Optimal Comfort and Wellbeing  Outcome: Progressing  Intervention: Monitor Pain and Promote Comfort  Recent Flowsheet Documentation  Taken 11/9/2024 2031 by Shantal Knight RN  Pain Management Interventions: medication (see MAR)  Goal: Readiness for Transition of Care  Outcome: Progressing     Problem: Fall Injury Risk  Goal: Absence of Fall and Fall-Related Injury  Outcome: Progressing  Intervention: Identify and Manage Contributors  Recent Flowsheet Documentation  Taken 11/9/2024 2048 by Shantal Knight RN  Medication Review/Management: medications reviewed  Intervention: Promote Injury-Free Environment  Recent Flowsheet Documentation  Taken 11/9/2024 2048 by Shantal Knight RN  Safety Promotion/Fall Prevention:   activity supervised   assistive device/personal items within reach   clutter free environment maintained   mobility aid in reach   nonskid shoes/slippers when out of bed   room near nurse's station   safety round/check completed     Problem: Pain Acute  Goal: Optimal Pain Control and Function  Outcome: Progressing  Intervention: Develop Pain Management Plan  Recent Flowsheet Documentation  Taken 11/9/2024 2031 by Shantal Knight RN  Pain Management Interventions: medication (see MAR)  Intervention: Prevent or Manage Pain  Recent Flowsheet Documentation  Taken 11/9/2024 2048 by Shantal Knight RN  Medication Review/Management: medications reviewed     Problem: Comorbidity Management  Goal: Blood Pressure in Desired Range  Outcome: Progressing  Intervention: Maintain Blood Pressure Management  Recent Flowsheet Documentation  Taken 11/9/2024 2048 by Shantal Knight  RN  Medication Review/Management: medications reviewed  Goal: Maintenance of Osteoarthritis Symptom Control  Outcome: Progressing  Intervention: Maintain Osteoarthritis Symptom Control  Recent Flowsheet Documentation  Taken 11/9/2024 2048 by Shantal Knight RN  Medication Review/Management: medications reviewed     Problem: Infection  Goal: Absence of Infection Signs and Symptoms  Outcome: Progressing

## 2024-11-10 NOTE — DISCHARGE SUMMARY
"[facility]  Hospitalist Discharge Summary      Date of Admission:  11/2/2024  Date of Discharge:  11/10/2024  Discharging Provider: Jerman Tolbert MD  Discharge Service: Hospitalist Service    Discharge Diagnoses   Pathologic fractures of T5, 8, 10, 12 and L2-3 vertebral bodies  Multiple myeloma  Essential hypertension  Anemia, leukopenia  Hypoxic respiratory failure    Clinically Significant Risk Factors     # Obesity: Estimated body mass index is 34.85 kg/m  as calculated from the following:    Height as of this encounter: 1.626 m (5' 4\").    Weight as of this encounter: 92.1 kg (203 lb 0.7 oz).  # Moderate Malnutrition: based on nutrition assessment      Follow-ups Needed After Discharge   Follow-up Appointments       Follow-up and recommended labs and tests       You have multiple upcoming scheduled follow-up appointments, starting with your pain clinic appointment on Monday and your scan scheduled for Tuesday.  Please keep all upcoming scheduled appointments.              Unresulted Labs Ordered in the Past 30 Days of this Admission       No orders found from 10/3/2024 to 11/3/2024.          Discharge Disposition   Discharged to home  Condition at discharge: Stable    Hospital Course   69-year-old female with recently diagnosed multiple myeloma on bone marrow biopsy but has not been started on treatment yet.  She comes in with severe back pain with multiple pathological fractures T5, 8, 10 and 12 seen on MRI and CT with no cord abnormality.    Severe back pain due to pathological fractures and T5, 8, 10 and 12 and L2/3 vertebral bodies.  - No neurosurgical intervention needed as per neurosurgery.  Recommended to consider bracing for comfort, patient was seen by orthotics but did not tolerate brace.      - Started radiation therapy to the spine T8-L3 on 11/4, plan for total of 5 days.  - Pain medication as per pain management team.  Started on scheduled Tylenol 975 mg 4 times daily, " scheduled OxyContin 20 mg twice daily.  As needed medications include oxycodone 10 milligrams as needed.  Has follow-up appointment with pain clinic tomorrow 11/11 after discharge, can discuss pain management in the outpatient setting moving forward at that visit.  Will fill current as needed pain medications.    Multiple myeloma  Recent diagnosis but has not been started on treatment yet.  Patient had a meeting scheduled with her oncology team on 11/5 which will be rescheduled.  According to patient has PET scan scheduled for Tuesday, oncology follow-up scheduled as well.    Essential hypertension  Resumed amlodipine and losartan.     Anemia and mild leukopenia  Likely secondary to multiple myeloma.  Will have close outpatient monitoring moving forward.    Hypoxic respiratory failure  Continues to require 1-2 L of supplemental oxygen to maintain O2 sats.  Cause somewhat unclear at this time.  Patient states that she feels like she cannot take a deep breath, does not do particularly well with incentive spirometry.  Despite this lung sounds are pretty clear, does report she has maybe had a little more of a cough the last day or 2.  CXR without any acute abnormality. May be due to severe deconditioning in setting of MM and recent round of radiation. Will likely discharge on home O2 and follow-up as outpatient to determine ongoing need or etiology of hypoxia.   - Discharging on supplemental oxygen, additional workup as needed if O2 requirements do not decrease after discharge      Consultations This Hospital Stay   CARE MANAGEMENT / SOCIAL WORK IP CONSULT  PHYSICAL THERAPY ADULT IP CONSULT  RADIATION ONCOLOGY IP CONSULT  PAIN MANAGEMENT ADULT IP CONSULT  PAIN MANAGEMENT ADULT IP CONSULT  PHARMACY LIAISON FOR MEDICATION COVERAGE CONSULT  SPIRITUAL HEALTH SERVICES IP CONSULT    Code Status   Full Code    Time Spent on this Encounter   Jerman CEBALLOS MD, personally saw the patient today and spent greater than 30 minutes  discharging this patient.       Jerman Tolbert MD  [facility] ORTHO SPINE  [address]  [facility] [address]  Phone: [phone]  Fax: [phone]  ______________________________________________________________________    Physical Exam   Vital Signs: Temp: 97.8  F (36.6  C) Temp src: Temporal BP: (!) 140/66 Pulse: 96   Resp: 14 SpO2: 95 % O2 Device: Nasal cannula Oxygen Delivery: 1 LPM  Weight: 203 lbs .7 oz  General Appearance:  Alert awake and oriented x 3.  Lying in bed, no acute distress  Respiratory: Clear to auscultation, unable to take very deep breath but normal work of breathing otherwise  Cardiovascular: S1-S2 normal  GI: Soft and nontender  Skin: No rash  Other:  No edema       Primary Care Physician   Daly Bedoya    Discharge Orders      Home Care Referral      Reason for your hospital stay    Radiation for your recently diagnosed multiple myeloma.     Follow-up and recommended labs and tests     He has multiple upcoming scheduled follow-up appointments, starting with your pain clinic appointment on Monday and your scan scheduled for Tuesday.  Please keep all upcoming scheduled appointments.     Activity    Your activity upon discharge: activity as tolerated     Home Oxygen Order for DME - ONLY FOR DME     Oxygen Adult/Peds    Oxygen Documentation  I certify that this patient, Scarlet Chapa has been under my care (or a nurse practitioner or physican's assistant working with me). This is the face-to-face encounter for oxygen medical necessity.      At the time of this encounter, I have reviewed the qualifying testing and have determined that supplemental oxygen is reasonable and necessary and is expected to improve the patient's condition in a home setting.         Patient has continued oxygen desaturation due to Chronic Respiratory Failure with Hypoxia J96.11.    If portability is ordered, is the patient mobile within the home? yes    Was this visit performed as a  telehealth visit: No     Diet    Follow this diet upon discharge: Current Diet:Orders Placed This Encounter      Snacks/Supplements Adult: Tanya Griggs Standard Oral Supplement; With Meals      Gluten Free Diet       Significant Results and Procedures   Most Recent 3 CBC's:  Recent Labs   Lab Test 11/09/24  0542 11/07/24  2350 11/07/24  0610 11/05/24  1246   WBC 2.2*  --  2.9* 4.1   HGB 10.1*  --  10.6* 10.7*   MCV 91  --  91 91    200 202 218     Most Recent 3 BMP's:  Recent Labs   Lab Test 11/09/24  0542 11/07/24  2350 11/07/24  1520 11/07/24  0610 11/05/24  1246     --   --  134* 133*   POTASSIUM 4.8  --   --  3.9 3.9   CHLORIDE 100  --   --  97* 97*   CO2 24  --   --  21* 23   BUN 19.6  --   --  22.0 18.5   CR 0.91 1.00*  --  0.88 0.83   ANIONGAP 14  --   --  16* 13   VANGIE 10.5*  --   --  10.4 10.3   GLC 99  --  91 90 95     Most Recent 2 LFT's:  Recent Labs   Lab Test 11/09/24  0542 11/02/24  1423   AST 37 42   ALT 61* 54*   ALKPHOS 168* 163*   BILITOTAL 0.5 0.5   ,   Results for orders placed or performed during the hospital encounter of 11/02/24   CT Thoracic Spine w/o Contrast    Narrative    EXAM: CT THORACIC SPINE W/O CONTRAST  LOCATION: [facility]  DATE: 11/2/2024    INDICATION: hx compression fracture spine T8, worsening pain  COMPARISON: September 22, 2023  TECHNIQUE: Routine CT Thoracic Spine without IV contrast. Multiplanar reformats. Dose reduction techniques were used.     FINDINGS:  Multilevel compression fractures at T5, T6, T8, T10 and T12 which are age indeterminate, however the T6, T8, T10 and T12 fractures appear acute or subacute. This could be confirmed with a thoracic spine MRI. There is associated thoracic kyphosis. No   other evidence of acute fracture or subluxation of the thoracic spine by CT imaging. Multilevel degenerative disc disease of the thoracic spine with disc height loss, most pronounced at T7/T8-T10/T11.    No significant posterior disc bulge,  spinal canal, or neural foraminal narrowing at any level within the thoracic spine.      Impression    IMPRESSION:    1.  Multilevel compression fractures at T5, T6, T8, T10 and T12 which are age indeterminate, however the T6, T8, T10 and T12 fractures appear acute or subacute. This could be confirmed with a thoracic spine MRI.   2.  No other evidence of acute fracture or subluxation of the thoracic spine by CT imaging.  3.  Thoracic kyphosis.   MR Thoracic Spine w/o & w Contrast    Narrative    EXAM: MR THORACIC SPINE W/O and W CONTRAST, MR LUMBAR SPINE W/O and W CONTRAST  LOCATION: [facility]  DATE: 11/2/2024    INDICATION: concern for pathologic fractures, intractable back pain, newly diagnosed multiple myeloma  COMPARISON: Same day thoracic spine CT. Lumbar spine radiographs: 9/13/2024.  CONTRAST: 9 ml Gadavist  TECHNIQUE:   1) Routine Thoracic Spine MRI without and with IV contrast.  2) Routine Lumbar Spine MRI without and with IV contrast.    FINDINGS:    THORACIC SPINE:    Unchanged height loss associated with multiple thoracic vertebral body fractures from same day CT study including the following: T5 (approximately 25-30% height loss), T6 (50% height loss), T8 (75% height loss), T10 (50% height loss), and T12 (40% height   loss). There is a mild to moderate degree of patchy STIR hyperintense signal and enhancement associated with fractures at T5, T8, T10, and T12. Very faint edema signal and enhancement is also seen throughout the T6 vertebral body with areas of   hypointense fracture line suggested. Minor bony retropulsion is seen associated with fractures at all the described levels, greatest and measuring 4 mm at T10 and T12.    Along the posterior aspect of the T10 vertebral body, there is asymmetric enhancing soft tissue, which appears to extend into the ventral epidural space with circumferential involvement at the T10-T11 level (series 10/images 6-11 and series 9/images    56-59). In combination with bony retropulsion of the T10 vertebral body, there is moderate central spinal canal stenosis. The abnormal enhancement extends towards the bilateral neural foramina at T10-T11.    Similar exaggeration of the mid thoracic kyphosis centered at T6. No substantial listhesis. No signal abnormality to suggest acute traumatic ligamentous injury in the thoracic spine.    Modic type II degenerative marrow changes are seen at multiple levels ventrally including T4-T5, T5-T6, T6-T7, and T7-T8.    Multilevel degenerative disc disease with disc height loss greatest and moderate at T8-T9. Multilevel facet arthropathy. Mild posterior disc bulging at T6-T7. No high-grade spinal canal or neural foraminal stenosis related to degenerative disease.    No cord signal abnormality. No pathologic cord enhancement.    Rounded 7 mm fat signal lesion involving the left aspect of the T3 vertebral body, favored to represent a small hemangioma.    No extraspinal abnormality.    LUMBAR SPINE:   Transitional lumbosacral anatomy with sacralized L5 segment. Fracture of the L2 superior endplate with approximately 30% height loss and mild patchy STIR hyperintense signal abnormality and enhancement throughout. There also appears to be a fracture of   the L3 superior endplate with minimal height loss and mild patchy STIR hyperintense signal abnormality and enhancement throughout the vertebral body. Approximately 4 mm bony retropulsion at L2 and L3. Abnormal enhancement is seen throughout the posterior   elements of all lumbar levels, concerning for myelomatous involvement. Minor grade 1 anterolisthesis of L3 on L4. Normal distal spinal cord and cauda equina with conus medullaris at L1. No pathologic contrast enhancement involving the distal spinal cord   or epidural spaces of the lumbar spine. Markedly heterogenous appearance of the sacrum/bony pelvis with patchy areas of irregular nodular enhancement.    T12-L1: Normal  disc height and signal. Small left paracentral disc protrusion with annular fissuring. Mild lateral facet hypertrophy. No substantial spinal canal stenosis. Mild bilateral neural foraminal stenosis.     L1-L2: Expansion of the disc space related to L2 superior endplate fracture. No disc herniation. Mild bilateral facet hypertrophy. Mild spinal canal stenosis related to L2 vertebral body bony retropulsion. No substantial neural foraminal stenosis.    L2-L3: Normal disc height and signal. Minimal posterior disc bulging. Moderate lateral facet hypertrophy. Slight ligament flavum thickening. No substantial spinal canal stenosis. No substantial neural foraminal stenosis.     L3-L4: Normal disc height and signal. Minor anterolisthesis. Moderate to advanced lateral facet hypertrophy. Moderate sized facet joint effusions bilaterally without periarticular edema. Ligamentum flavum thickening. No substantial spinal canal stenosis.   Mild bilateral neural foraminal stenosis.    L4-L5: Normal disc height and signal. Small left foraminal disc protrusion. Moderate bilateral facet hypertrophy. No substantial spinal canal stenosis. Moderate left neural foraminal stenosis. No substantial right neural foraminal stenosis.    L5-S1: Rudimentary disc. No substantial spinal canal or neural foraminal stenosis.      Impression    IMPRESSION:    THORACIC SPINE MRI:  1.  Unchanged height loss associated with vertebral body fractures at T5, T6, T8, T10, and T12 as seen on prior same-day CT study and further detailed above. Mild to moderate patchy STIR hyperintense signal abnormality and associated enhancement are seen   at these levels, particularly at T5, T8, T10, and T12, suggesting subacute processes. Given patient's reported history of multiple myeloma, there is a high concern for pathologic fractures.  2.  Abnormal asymmetric enhancement along the posterior aspect of the T10 vertebral body, which appears to extend into the ventral  epidural space with circumferential involvement at the T10-T11 level, concerning for soft tissue component of T10 vertebral   body neoplasm. In combination with bony retropulsion of the T10 vertebral body, there is moderate spinal canal stenosis. Abnormal enhancement also extends towards the bilateral T10-T11 neural foramina. Advise close attention on future follow-up imaging.  3.  No cord signal abnormality or pathologic cord enhancement.  4.  Multilevel thoracic spondylosis without associated high-grade spinal canal or neural foraminal stenosis.    LUMBAR SPINE MRI:  1.  Transitional lumbosacral anatomy with sacralized L5 segment.  2.  Findings concerning for extensive myelomatous involvement throughout the lumbar spine and sacrum/bony pelvis as detailed above.  3.  Suspected pathologic fractures involving the L2 and L3 vertebral bodies with mild patchy STIR hyperintense signal abnormality and enhancement throughout these vertebral bodies, likely representing a subacute process.  4.  Multilevel lumbar spondylosis.  5.  No high-grade spinal canal or neural foraminal stenosis.  6.  No pathologic contrast enhancement involving the distal spinal cord or epidural spaces of the lumbar spine.   MR Lumbar Spine w/o & w Contrast    Narrative    EXAM: MR THORACIC SPINE W/O and W CONTRAST, MR LUMBAR SPINE W/O and W CONTRAST  LOCATION: [facility]  DATE: 11/2/2024    INDICATION: concern for pathologic fractures, intractable back pain, newly diagnosed multiple myeloma  COMPARISON: Same day thoracic spine CT. Lumbar spine radiographs: 9/13/2024.  CONTRAST: 9 ml Gadavist  TECHNIQUE:   1) Routine Thoracic Spine MRI without and with IV contrast.  2) Routine Lumbar Spine MRI without and with IV contrast.    FINDINGS:    THORACIC SPINE:    Unchanged height loss associated with multiple thoracic vertebral body fractures from same day CT study including the following: T5 (approximately 25-30% height loss), T6  (50% height loss), T8 (75% height loss), T10 (50% height loss), and T12 (40% height   loss). There is a mild to moderate degree of patchy STIR hyperintense signal and enhancement associated with fractures at T5, T8, T10, and T12. Very faint edema signal and enhancement is also seen throughout the T6 vertebral body with areas of   hypointense fracture line suggested. Minor bony retropulsion is seen associated with fractures at all the described levels, greatest and measuring 4 mm at T10 and T12.    Along the posterior aspect of the T10 vertebral body, there is asymmetric enhancing soft tissue, which appears to extend into the ventral epidural space with circumferential involvement at the T10-T11 level (series 10/images 6-11 and series 9/images   56-59). In combination with bony retropulsion of the T10 vertebral body, there is moderate central spinal canal stenosis. The abnormal enhancement extends towards the bilateral neural foramina at T10-T11.    Similar exaggeration of the mid thoracic kyphosis centered at T6. No substantial listhesis. No signal abnormality to suggest acute traumatic ligamentous injury in the thoracic spine.    Modic type II degenerative marrow changes are seen at multiple levels ventrally including T4-T5, T5-T6, T6-T7, and T7-T8.    Multilevel degenerative disc disease with disc height loss greatest and moderate at T8-T9. Multilevel facet arthropathy. Mild posterior disc bulging at T6-T7. No high-grade spinal canal or neural foraminal stenosis related to degenerative disease.    No cord signal abnormality. No pathologic cord enhancement.    Rounded 7 mm fat signal lesion involving the left aspect of the T3 vertebral body, favored to represent a small hemangioma.    No extraspinal abnormality.    LUMBAR SPINE:   Transitional lumbosacral anatomy with sacralized L5 segment. Fracture of the L2 superior endplate with approximately 30% height loss and mild patchy STIR hyperintense signal abnormality  and enhancement throughout. There also appears to be a fracture of   the L3 superior endplate with minimal height loss and mild patchy STIR hyperintense signal abnormality and enhancement throughout the vertebral body. Approximately 4 mm bony retropulsion at L2 and L3. Abnormal enhancement is seen throughout the posterior   elements of all lumbar levels, concerning for myelomatous involvement. Minor grade 1 anterolisthesis of L3 on L4. Normal distal spinal cord and cauda equina with conus medullaris at L1. No pathologic contrast enhancement involving the distal spinal cord   or epidural spaces of the lumbar spine. Markedly heterogenous appearance of the sacrum/bony pelvis with patchy areas of irregular nodular enhancement.    T12-L1: Normal disc height and signal. Small left paracentral disc protrusion with annular fissuring. Mild lateral facet hypertrophy. No substantial spinal canal stenosis. Mild bilateral neural foraminal stenosis.     L1-L2: Expansion of the disc space related to L2 superior endplate fracture. No disc herniation. Mild bilateral facet hypertrophy. Mild spinal canal stenosis related to L2 vertebral body bony retropulsion. No substantial neural foraminal stenosis.    L2-L3: Normal disc height and signal. Minimal posterior disc bulging. Moderate lateral facet hypertrophy. Slight ligament flavum thickening. No substantial spinal canal stenosis. No substantial neural foraminal stenosis.     L3-L4: Normal disc height and signal. Minor anterolisthesis. Moderate to advanced lateral facet hypertrophy. Moderate sized facet joint effusions bilaterally without periarticular edema. Ligamentum flavum thickening. No substantial spinal canal stenosis.   Mild bilateral neural foraminal stenosis.    L4-L5: Normal disc height and signal. Small left foraminal disc protrusion. Moderate bilateral facet hypertrophy. No substantial spinal canal stenosis. Moderate left neural foraminal stenosis. No substantial right  neural foraminal stenosis.    L5-S1: Rudimentary disc. No substantial spinal canal or neural foraminal stenosis.      Impression    IMPRESSION:    THORACIC SPINE MRI:  1.  Unchanged height loss associated with vertebral body fractures at T5, T6, T8, T10, and T12 as seen on prior same-day CT study and further detailed above. Mild to moderate patchy STIR hyperintense signal abnormality and associated enhancement are seen   at these levels, particularly at T5, T8, T10, and T12, suggesting subacute processes. Given patient's reported history of multiple myeloma, there is a high concern for pathologic fractures.  2.  Abnormal asymmetric enhancement along the posterior aspect of the T10 vertebral body, which appears to extend into the ventral epidural space with circumferential involvement at the T10-T11 level, concerning for soft tissue component of T10 vertebral   body neoplasm. In combination with bony retropulsion of the T10 vertebral body, there is moderate spinal canal stenosis. Abnormal enhancement also extends towards the bilateral T10-T11 neural foramina. Advise close attention on future follow-up imaging.  3.  No cord signal abnormality or pathologic cord enhancement.  4.  Multilevel thoracic spondylosis without associated high-grade spinal canal or neural foraminal stenosis.    LUMBAR SPINE MRI:  1.  Transitional lumbosacral anatomy with sacralized L5 segment.  2.  Findings concerning for extensive myelomatous involvement throughout the lumbar spine and sacrum/bony pelvis as detailed above.  3.  Suspected pathologic fractures involving the L2 and L3 vertebral bodies with mild patchy STIR hyperintense signal abnormality and enhancement throughout these vertebral bodies, likely representing a subacute process.  4.  Multilevel lumbar spondylosis.  5.  No high-grade spinal canal or neural foraminal stenosis.  6.  No pathologic contrast enhancement involving the distal spinal cord or epidural spaces of the lumbar  spine.   XR Chest Port 1 View    Narrative    XR CHEST PORT 1 VIEW   2024 9:13 AM     HISTORY: Shortness of breath.    COMPARISON: None.      Impression    IMPRESSION: Hypoinflated lungs and mild vascular congestion. Mild left  lung base atelectasis versus early focal airspace disease. Normal  cardiac silhouette.    DRE SHAH MD         SYSTEM ID:  [id number]   XR Chest 2 Views    Narrative    EXAM: XR CHEST 2 VIEWS  LOCATION: [facility]  DATE: 2024    INDICATION: Hypoxia  COMPARISON: 2024      Impression    IMPRESSION: Negative chest.   Echocardiogram Complete     Value    LVEF  >70%    Narrative    [id number]  [id number]  [id number]  [id number]^ORLANDO^YONNY     [facility]  Echocardiography Laboratory  [address] [place] [address]     Name: RODRI GARCIA  MRN: [id number]  : 1955  Study Date: 2024 08:20 AM  Age: 69 yrs  Gender: Female  Patient Location: \[facility] [place] [facility]\""  Reason For Study: SOB  Ordering Physician: YONNY PERRY  Performed By: MARIBEL Bansal     BSA: 2.0 m2  Height: 64 in  Weight: 200 lb  HR: 102  BP: 141/67 mmHg  ______________________________________________________________________________  Procedure  Complete Portable Echo Adult.  ______________________________________________________________________________  Interpretation Summary     Hyperdynamic left ventricular function.  The visual ejection fraction is >70%.  No regional wall motion abnormalities.  Normal right ventricular size and systolic function.  No significant valve disease.     There is no comparison study available.  ______________________________________________________________________________  Left Ventricle  The left ventricle is normal in size. There is normal left ventricular wall  thickness. Hyperdynamic left ventricular function. The visual ejection  fraction is >70%. Grade I or early diastolic dysfunction. No regional wall  motion abnormalities noted.      Right Ventricle  The right ventricle is normal in size and function.     Atria  Normal left atrial size. Right atrial size is normal. There is no atrial shunt  seen.     Mitral Valve  The mitral valve leaflets appear normal. There is no evidence of stenosis,  fluttering, or prolapse. No systolic anterior motion of the mitral valve.  There is trace mitral regurgitation. There is no mitral valve stenosis.     Tricuspid Valve  Normal tricuspid valve. There is trace tricuspid regurgitation. Right  ventricular systolic pressure could not be approximated due to inadequate  tricuspid regurgitation.     Aortic Valve  The aortic valve is trileaflet. No aortic regurgitation is present. No aortic  stenosis is present.     Pulmonic Valve  There is trace pulmonic valvular regurgitation.     Vessels  The aortic root is normal size. Normal size ascending aorta. The inferior vena  cava is normal.     Pericardium  There is no pericardial effusion.     Rhythm  The rhythm was sinus tachycardia.  ______________________________________________________________________________  MMode/2D Measurements & Calculations     IVSd: 1.1 cm  LVIDd: 3.7 cm  LVIDs: 2.6 cm  LVPWd: 1.0 cm  FS: 28.4 %  LV mass(C)d: 120.4 grams  LV mass(C)dI: 61.5 grams/m2  Ao root diam: 2.9 cm  LVOT diam: 2.0 cm  LVOT area: 3.1 cm2  Ao root diam index Ht(cm/m): 1.8  Ao root diam index BSA (cm/m2): 1.5  LA Volume (BP): 57.8 ml  LA Volume Index (BP): 29.5 ml/m2     RWT: 0.56  TAPSE: 2.7 cm     Doppler Measurements & Calculations  MV E max nasim: 104.0 cm/sec  MV A max nasim: 129.2 cm/sec  MV E/A: 0.81  MV dec slope: 687.3 cm/sec2  MV dec time: 0.15 sec  PA V2 max: 128.1 cm/sec  PA max P.6 mmHg  PA acc time: 0.08 sec  E/E' av.1  Lateral E/e': 13.9  Medial E/e': 14.3  RV S Nasim: 15.0 cm/sec     ______________________________________________________________________________  Report approved by: Dr Capri Dos Santos 2024 10:06 AM             Discharge Medications    Current Discharge Medication List        START taking these medications    Details   calcitonin, salmon, (MIACALCIN) 200 UNIT/ACT nasal spray Spray 1 spray into one nostril alternating nostrils daily. Alternate nostril each day.  Qty: 3.7 mL, Refills: 0    Associated Diagnoses: Multiple myeloma not having achieved remission (H)      fentaNYL (DURAGESIC) 12 mcg/hr 72 hr patch Place 1 patch over 72 hours onto the skin every 72 hours. remove old patch.  Qty: 5 patch, Refills: 0    Associated Diagnoses: Multiple myeloma not having achieved remission (H)      hydrOXYzine HCl (ATARAX) 25 MG tablet Take 1 tablet (25 mg) by mouth every 8 hours as needed for other (adjuvant pain).  Qty: 30 tablet, Refills: 0    Associated Diagnoses: S/P total left hip arthroplasty      Lidocaine (LIDOCARE) 4 % Patch Place 2 patches over 12 hours onto the skin every 24 hours. To prevent lidocaine toxicity, patient should be patch free for 12 hrs daily.  Qty: 10 patch, Refills: 0    Associated Diagnoses: Intractable back pain      magnesium oxide (MAG-OX) 400 MG tablet Take 1 tablet (400 mg) by mouth 2 times daily.  Qty: 60 tablet, Refills: 0    Associated Diagnoses: Multiple myeloma not having achieved remission (H)      ondansetron (ZOFRAN ODT) 4 MG ODT tab Take 1 tablet (4 mg) by mouth every 6 hours as needed for nausea or vomiting.  Qty: 20 tablet, Refills: 0    Associated Diagnoses: Nephrolithiasis; S/P total left hip arthroplasty; Kidney stone           CONTINUE these medications which have CHANGED    Details   oxyCODONE IR (ROXICODONE) 10 MG tablet Take 1 tablet (10 mg) by mouth every 4 hours as needed for moderate pain.  Qty: 10 tablet, Refills: 0    Associated Diagnoses: Multiple myeloma not having achieved remission (H)           CONTINUE these medications which have NOT CHANGED    Details   acetaminophen (TYLENOL) 500 MG tablet Take 1,000 mg by mouth 4 times daily.      amLODIPine (NORVASC) 5 MG tablet Take 1 tablet (5 mg) by  mouth daily      losartan (COZAAR) 100 MG tablet Take 100 mg by mouth daily.      polyethylene glycol (MIRALAX) 17 GM/Dose powder Take 17 g by mouth at bedtime.      senna (SENOKOT) 8.6 MG tablet Take 8.6 mg by mouth 2 times daily as needed for constipation.           Allergies   Allergies   Allergen Reactions    Gluten Meal Other (See Comments)     abd bloating, gas    Casein Other (See Comments)     Sneezing, watery eyes

## 2024-11-10 NOTE — PLAN OF CARE
Physical Therapy Discharge Summary    Reason for therapy discharge:    Discharged to home with home therapy.    Progress towards therapy goal(s). See goals on Care Plan in [facility] electronic health record for goal details.  Goals not met.  Barriers to achieving goals:   discharge from facility.    Therapy recommendation(s):    Continued therapy is recommended.  Rationale/Recommendations:  Recommending home with assist as needed and HHPT to progress strength and IND mobility.

## 2024-11-10 NOTE — PROGRESS NOTES
Patient has been assessed for Home Oxygen needs. Oxygen readings:    *Pulse oximetry (SpO2) = 88% on room air at rest while awake.    *SpO2 improved to 94% on 2liters/minute at rest.    *SpO2 = 85% on room air during activity/with exercise.    *SpO2 improved to 93% on 2 liters/minute during activity/with exercise.

## 2024-11-11 ENCOUNTER — PATIENT OUTREACH (OUTPATIENT)
Dept: CARE COORDINATION | Facility: CLINIC | Age: 69
End: 2024-11-11
Payer: COMMERCIAL

## 2024-11-11 NOTE — PROGRESS NOTES
[facility]: [facility]    Background: Transitional Care Management program identified per system criteria and reviewed by [facility] team for possible outreach.    Assessment: Upon chart review, [facility] Team member will not proceed with patient outreach related to this episode of Transitional Care Management program due to reason below:    Patient has a follow up appointment with an appropriate provider today for hospital discharge    Plan: Transitional Care Management episode addressed appropriately per reason noted above.      RHONDA Mccann  [facility] [place]    *Connected Care Resource Team does NOT follow patient ongoing. Referrals are identified based on internal discharge reports and the outreach is to ensure patient has an understanding of their discharge instructions.

## 2024-11-30 ENCOUNTER — LAB REQUISITION (OUTPATIENT)
Dept: LAB | Facility: CLINIC | Age: 69
End: 2024-11-30
Payer: COMMERCIAL

## 2024-11-30 DIAGNOSIS — R76.12 NONSPECIFIC REACTION TO CELL MEDIATED IMMUNITY MEASUREMENT OF GAMMA INTERFERON ANTIGEN RESPONSE WITHOUT ACTIVE TUBERCULOSIS: ICD-10-CM

## 2024-12-03 LAB
GAMMA INTERFERON BACKGROUND BLD IA-ACNC: 0.2 IU/ML
M TB IFN-G BLD-IMP: NEGATIVE
M TB IFN-G CD4+ BCKGRND COR BLD-ACNC: 3.41 IU/ML
MITOGEN IGNF BCKGRD COR BLD-ACNC: -0.01 IU/ML
MITOGEN IGNF BCKGRD COR BLD-ACNC: -0.01 IU/ML
QUANTIFERON MITOGEN: 3.61 IU/ML
QUANTIFERON NIL TUBE: 0.2 IU/ML
QUANTIFERON TB1 TUBE: 0.19 IU/ML
QUANTIFERON TB2 TUBE: 0.19

## 2024-12-05 ENCOUNTER — LAB REQUISITION (OUTPATIENT)
Dept: LAB | Facility: CLINIC | Age: 69
End: 2024-12-05
Payer: COMMERCIAL

## 2024-12-05 DIAGNOSIS — D63.0 ANEMIA IN NEOPLASTIC DISEASE: ICD-10-CM

## (undated) DEVICE — SU STRATAFIX PDS PLUS 1 CT-1 18" SXPP1A404

## (undated) DEVICE — GLOVE BIOGEL PI ORTHOPRO SZ 7.5 47675

## (undated) DEVICE — SOL WATER IRRIG 1000ML BOTTLE 2F7114

## (undated) DEVICE — PREP SCRUB SOL EXIDINE 4% CHG 4OZ 29002-404

## (undated) DEVICE — DRESSING MEPILEX AG SILVER 4X12 395990

## (undated) DEVICE — LABEL MEDICATION SYSTEM 3303-P

## (undated) DEVICE — BASKET NITINOL TIPLESS HALO  1.5FRX120CM 554120

## (undated) DEVICE — LINEN FULL SHEET 5511

## (undated) DEVICE — ESU ELEC BLADE 6" COATED E1450-6

## (undated) DEVICE — BONE CLEANING TIP INTERPULSE  0210-010-000

## (undated) DEVICE — SOL NACL 0.9% IRRIG 3000ML BAG 2B7477

## (undated) DEVICE — GLOVE BIOGEL INDICATOR 7.5 LF 41675

## (undated) DEVICE — BLADE SAGITTAL WIDE (SO-618) 2108-118

## (undated) DEVICE — CERAMIC V40 FEMORAL HEAD
Type: IMPLANTABLE DEVICE | Site: HIP | Status: NON-FUNCTIONAL
Brand: BIOLOX

## (undated) DEVICE — SU ETHIBOND 5 V-37 4X30" MB66G

## (undated) DEVICE — GLOVE BIOGEL PI INDICATOR 8.0 LF 41680

## (undated) DEVICE — SOL WATER IRRIG 3000ML BAG 2B7117

## (undated) DEVICE — PREP CHLORHEXIDINE 4% 4OZ (HIBICLENS) 57504

## (undated) DEVICE — SU DERMABOND ADVANCED .7ML DNX12

## (undated) DEVICE — PAD HIP POSITIONING MCGUIRE 707-CPM

## (undated) DEVICE — COVER FOOTSWITCH W/CINCH 20X24" 923267

## (undated) DEVICE — CLEANSER JET LAVAGE IRRISEPT 0.05% CHG IRRISEPT45USA

## (undated) DEVICE — GOWN IMPERVIOUS BREATHABLE SMART XLG 89045

## (undated) DEVICE — GLOVE BIOGEL PI ULTRATOUCH SZ 7.5 41175

## (undated) DEVICE — DRAPE IOBAN INCISE 36X23" 6651EZ

## (undated) DEVICE — CUSTOM PACK TOTAL HIP SOP5BTHHEA

## (undated) DEVICE — BAG CLEAR TRASH 1.3M 39X33" P4040C

## (undated) DEVICE — SOL NACL 0.9% IRRIG 1000ML BOTTLE 2F7124

## (undated) DEVICE — TUBING IRRIG TUR Y TYPE 96" LF 6543-01

## (undated) DEVICE — SOLUTION IRRIG 2B7127 .9NS 3000ML BAG

## (undated) DEVICE — SUCTION MANIFOLD NEPTUNE 2 SYS 4 PORT 0702-020-000

## (undated) DEVICE — KIT ENDO TURNOVER/PROCEDURE W/CLEAN A SCOPE LINERS 103888

## (undated) DEVICE — PACK CYSTO CUSTOM RIDGES

## (undated) DEVICE — ENDO TRAP POLYP QUICK CATCH 710201

## (undated) DEVICE — SUTURE VICRYL+ 2-0 27IN CT-1 UND VCP259H

## (undated) DEVICE — GLOVE BIOGEL PI SZ 6.5 40865

## (undated) DEVICE — LINEN HALF SHEET 5512

## (undated) DEVICE — SU MONOCRYL 3-0 PS-2 18" UND MCP497G

## (undated) DEVICE — GUIDEWIRE URO STR STIFF .035"X150CM NITINOL 150NSS35

## (undated) DEVICE — RAD RX ISOVUE 300 (50ML) 61% IOPAMIDOL CHARGE PER ML

## (undated) DEVICE — HOLDER LIMB VELCRO OR 0814-1533

## (undated) DEVICE — GLOVE BIOGEL PI ULTRATOUCH G SZ 7.5 42175

## (undated) DEVICE — FIBER LASER 200 UM DISPOSABLE TFL-FBX200S

## (undated) DEVICE — GLOVE BIOGEL PI SZ 7.0 40870

## (undated) DEVICE — PLATE GROUNDING ADULT W/CORD 9165L

## (undated) DEVICE — GUIDEWIRE SENSOR DUAL FLEX STR 0.035"X150CM M0066703080

## (undated) DEVICE — CATH URETERAL FLEX TIP TIGERTAIL 06FRX70CM 139006

## (undated) DEVICE — SUCTION IRR SYSTEM W/O TIP INTERPULSE HANDPIECE 0210-100-000

## (undated) DEVICE — A3 SUPPLIES- SEE NURSING INFO PAGE

## (undated) DEVICE — SUTURE VICRYL+ 0 27IN CT-1 UND VCP260H

## (undated) RX ORDER — FENTANYL CITRATE 50 UG/ML
INJECTION, SOLUTION INTRAMUSCULAR; INTRAVENOUS
Status: DISPENSED
Start: 2017-11-01

## (undated) RX ORDER — DEXAMETHASONE SODIUM PHOSPHATE 10 MG/ML
INJECTION, EMULSION INTRAMUSCULAR; INTRAVENOUS
Status: DISPENSED
Start: 2023-08-04

## (undated) RX ORDER — PROPOFOL 10 MG/ML
INJECTION, EMULSION INTRAVENOUS
Status: DISPENSED
Start: 2023-08-04

## (undated) RX ORDER — PROPOFOL 10 MG/ML
INJECTION, EMULSION INTRAVENOUS
Status: DISPENSED
Start: 2023-10-06

## (undated) RX ORDER — PROPOFOL 10 MG/ML
INJECTION, EMULSION INTRAVENOUS
Status: DISPENSED
Start: 2023-09-23

## (undated) RX ORDER — FENTANYL CITRATE 50 UG/ML
INJECTION, SOLUTION INTRAMUSCULAR; INTRAVENOUS
Status: DISPENSED
Start: 2023-10-06

## (undated) RX ORDER — DEXAMETHASONE SODIUM PHOSPHATE 4 MG/ML
INJECTION, SOLUTION INTRA-ARTICULAR; INTRALESIONAL; INTRAMUSCULAR; INTRAVENOUS; SOFT TISSUE
Status: DISPENSED
Start: 2023-09-23

## (undated) RX ORDER — FENTANYL CITRATE-0.9 % NACL/PF 10 MCG/ML
PLASTIC BAG, INJECTION (ML) INTRAVENOUS
Status: DISPENSED
Start: 2023-09-23

## (undated) RX ORDER — GLYCOPYRROLATE 0.2 MG/ML
INJECTION, SOLUTION INTRAMUSCULAR; INTRAVENOUS
Status: DISPENSED
Start: 2023-08-04

## (undated) RX ORDER — FENTANYL CITRATE-0.9 % NACL/PF 10 MCG/ML
PLASTIC BAG, INJECTION (ML) INTRAVENOUS
Status: DISPENSED
Start: 2023-08-04

## (undated) RX ORDER — DEXAMETHASONE SODIUM PHOSPHATE 4 MG/ML
INJECTION, SOLUTION INTRA-ARTICULAR; INTRALESIONAL; INTRAMUSCULAR; INTRAVENOUS; SOFT TISSUE
Status: DISPENSED
Start: 2023-10-06

## (undated) RX ORDER — ONDANSETRON 2 MG/ML
INJECTION INTRAMUSCULAR; INTRAVENOUS
Status: DISPENSED
Start: 2023-09-23

## (undated) RX ORDER — CEFAZOLIN SODIUM/WATER 2 G/20 ML
SYRINGE (ML) INTRAVENOUS
Status: DISPENSED
Start: 2023-10-06

## (undated) RX ORDER — LIDOCAINE HYDROCHLORIDE 10 MG/ML
INJECTION, SOLUTION EPIDURAL; INFILTRATION; INTRACAUDAL; PERINEURAL
Status: DISPENSED
Start: 2023-10-06

## (undated) RX ORDER — ONDANSETRON 2 MG/ML
INJECTION INTRAMUSCULAR; INTRAVENOUS
Status: DISPENSED
Start: 2023-08-04

## (undated) RX ORDER — ONDANSETRON 2 MG/ML
INJECTION INTRAMUSCULAR; INTRAVENOUS
Status: DISPENSED
Start: 2023-10-06

## (undated) RX ORDER — KETOROLAC TROMETHAMINE 30 MG/ML
INJECTION, SOLUTION INTRAMUSCULAR; INTRAVENOUS
Status: DISPENSED
Start: 2023-09-23

## (undated) RX ORDER — FENTANYL CITRATE 50 UG/ML
INJECTION, SOLUTION INTRAMUSCULAR; INTRAVENOUS
Status: DISPENSED
Start: 2023-09-23

## (undated) RX ORDER — CEFAZOLIN SODIUM/WATER 2 G/20 ML
SYRINGE (ML) INTRAVENOUS
Status: DISPENSED
Start: 2023-09-23

## (undated) RX ORDER — FENTANYL CITRATE-0.9 % NACL/PF 10 MCG/ML
PLASTIC BAG, INJECTION (ML) INTRAVENOUS
Status: DISPENSED
Start: 2023-10-06

## (undated) RX ORDER — LIDOCAINE HYDROCHLORIDE 10 MG/ML
INJECTION, SOLUTION EPIDURAL; INFILTRATION; INTRACAUDAL; PERINEURAL
Status: DISPENSED
Start: 2023-09-23